# Patient Record
Sex: MALE | Race: WHITE | URBAN - METROPOLITAN AREA
[De-identification: names, ages, dates, MRNs, and addresses within clinical notes are randomized per-mention and may not be internally consistent; named-entity substitution may affect disease eponyms.]

---

## 2017-06-21 ENCOUNTER — EMERGENCY (EMERGENCY)
Facility: HOSPITAL | Age: 62
LOS: 0 days | Discharge: ANOTHER HOSPITAL | End: 2017-06-22
Attending: EMERGENCY MEDICINE | Admitting: EMERGENCY MEDICINE
Payer: COMMERCIAL

## 2017-06-21 VITALS — WEIGHT: 175.05 LBS

## 2017-06-21 DIAGNOSIS — K56.69 OTHER INTESTINAL OBSTRUCTION: Chronic | ICD-10-CM

## 2017-06-21 DIAGNOSIS — H53.9 UNSPECIFIED VISUAL DISTURBANCE: ICD-10-CM

## 2017-06-21 DIAGNOSIS — K35.2 ACUTE APPENDICITIS WITH GENERALIZED PERITONITIS: Chronic | ICD-10-CM

## 2017-06-21 DIAGNOSIS — K66.0 PERITONEAL ADHESIONS (POSTPROCEDURAL) (POSTINFECTION): Chronic | ICD-10-CM

## 2017-06-21 PROCEDURE — 99283 EMERGENCY DEPT VISIT LOW MDM: CPT

## 2017-06-21 NOTE — ED ADULT NURSE REASSESSMENT NOTE - NS ED NURSE REASSESS COMMENT FT1
Visual acuity up to line 7 "with my bad eye" and up to line 9 "with my good eye" Visual acuity up to line 7 "with my bad eye" and up to line 9 "with my good eye". Pt with 20/30 vision to left eye.

## 2017-06-21 NOTE — ED STATDOCS - OBJECTIVE STATEMENT
62 y/o male who saw floaters in his vision that looked like "red blood cells" 10 days ago. Pt then went on vacation, returned this week and began seeing bigger floaters. Is unable to get an appointment with PMD. Today, pt began to have cloudy vision only in right eye. PMD Dr. Baumann

## 2017-06-21 NOTE — ED ADULT TRIAGE NOTE - CHIEF COMPLAINT QUOTE
States he has been seeing "floaters" x1 week, and today started with cloudy vision. Unable to get appt with doctor and wants to be seen tonight.

## 2017-06-21 NOTE — ED STATDOCS - PSH
Abdominal adhesions  9/18/2014 laprascopic lysis of adhesions  Perforated appendicitis    SBO (small bowel obstruction)  x 3

## 2017-06-21 NOTE — ED ADULT NURSE NOTE - OBJECTIVE STATEMENT
Pt presents to ER c/o floaters and blurry vision in right eye. Pt reports having floaters in right eye for 10 days and today having onset of blurry vision at 5pm. 3mm pupils, PERRLA.  pt denies headache. neuro intact in b/l upper and lower extremity, equal b/l ROM, face symmetrical, no slurred speech. AO x 3 oriented to baseline, normal breathing pattern with no difficulty

## 2017-06-21 NOTE — ED STATDOCS - PROGRESS NOTE DETAILS
spoke with Dr. Frances opthalmology from [place] and will accept transfer to [place] to evaluate for retinal detachment

## 2017-06-22 ENCOUNTER — EMERGENCY (EMERGENCY)
Facility: HOSPITAL | Age: 62
LOS: 1 days | Discharge: HOME | End: 2017-06-22
Attending: PERSONAL EMERGENCY RESPONSE ATTENDANT | Admitting: PERSONAL EMERGENCY RESPONSE ATTENDANT
Payer: COMMERCIAL

## 2017-06-22 VITALS
RESPIRATION RATE: 17 BRPM | HEART RATE: 55 BPM | SYSTOLIC BLOOD PRESSURE: 144 MMHG | OXYGEN SATURATION: 99 % | TEMPERATURE: 98 F | DIASTOLIC BLOOD PRESSURE: 93 MMHG

## 2017-06-22 VITALS
DIASTOLIC BLOOD PRESSURE: 91 MMHG | SYSTOLIC BLOOD PRESSURE: 143 MMHG | OXYGEN SATURATION: 96 % | RESPIRATION RATE: 17 BRPM | TEMPERATURE: 98 F | HEART RATE: 53 BPM

## 2017-06-22 VITALS
RESPIRATION RATE: 17 BRPM | HEART RATE: 54 BPM | DIASTOLIC BLOOD PRESSURE: 100 MMHG | SYSTOLIC BLOOD PRESSURE: 174 MMHG | OXYGEN SATURATION: 96 % | TEMPERATURE: 98 F

## 2017-06-22 DIAGNOSIS — K66.0 PERITONEAL ADHESIONS (POSTPROCEDURAL) (POSTINFECTION): Chronic | ICD-10-CM

## 2017-06-22 DIAGNOSIS — K35.2 ACUTE APPENDICITIS WITH GENERALIZED PERITONITIS: Chronic | ICD-10-CM

## 2017-06-22 DIAGNOSIS — K56.69 OTHER INTESTINAL OBSTRUCTION: Chronic | ICD-10-CM

## 2017-06-22 PROCEDURE — 99283 EMERGENCY DEPT VISIT LOW MDM: CPT | Mod: 25

## 2017-06-22 PROCEDURE — 99283 EMERGENCY DEPT VISIT LOW MDM: CPT

## 2017-06-22 NOTE — ED PROVIDER NOTE - ATTENDING CONTRIBUTION TO CARE
Attending MD Palm.  Agree with above.  Pt is a 61 yr old male who endorses 10 day hx of ‘floaters in R eye’ and then today this became constant.  Pt seen at [facility] and transferred to [facility] ED for opho eval.  Other medical problems include HTN and occasional SBO’s, depression.  No eye pain.  Isolated R eye visual changes.  Visual fields intact without deficit on exam.  Endorses only complaint of floaters.  Pt has no eye pain.  Sxs have been present on and off for 10 days without headache or other associated neuro sxs.  Optho has performed thorough exam and recommend follow-up in their clinic tomorrow.  Pt also advised to follow-up with neurology for non-emergent imaging/eval.  Return to ED immediately for any associated sxs including headache, new/focal neuro deficits or development of similar sxs in L eye.  No numbness/weakness/tingling.  Stable for discharge.

## 2017-06-22 NOTE — ED ADULT NURSE NOTE - OBJECTIVE STATEMENT
transfer from [facility] with c/o cloudy vision rt eye since 5 pm; has had floaters x 10 days and went on vacation; now returned and feels condition is worsening; comes here for opto consult

## 2017-06-22 NOTE — CONSULT NOTE ADULT - SUBJECTIVE AND OBJECTIVE BOX
CC/HPI: 61 M p/w worsening floaters OD x10 days, now feels VA is decreasing.     PMH:   POH: none  meds: reviewed  all: NKDA    VA cc near 20/20 OU  P R+R OU  T 13 OU  EOM full OU  CVF full OU  no red desaturation    LL flat OU  C/S w+Q OU  K clear OU  AC D+F OU  I flat OU  L ns OU    DFE:  OD  OS CC/HPI: 61 M p/w worsening floaters OD x10 days, now feels VA is decreasing.     PMH: HTN, depression  POH: none  meds: reviewed  all: NKDA    VA cc near 20/20 OU  P R+R OU  T 13 OU  EOM full OU  CVF full OU  no red desaturation    LL flat OU  C/S w+Q OU  K clear OU  AC D+F OU  I flat OU  L ns OU    DFE:  OD nerve pink and sharp, m/v/p wnl, no RD/tears/holes  OS nerve pink and sharp, m/v/p wnl CC/HPI: 61 M p/w worsening floaters OD x10 days, now feels VA is decreasing.     PMH: HTN, depression  POH: none  meds: reviewed  all: NKDA    VA cc near 20/20 OU  P R+R OU  T 13 OU  EOM full OU  CVF full OU  no red desaturation    LL flat OU  C/S w+Q OU  K clear OU  AC D+Q OU  I flat OU  L ns OU    DFE:  OD nerve pink and sharp, m/v/p wnl, no RD/tears/holes, no vitritis, no PVD  OS nerve pink and sharp, m/v/p wnl

## 2017-06-22 NOTE — ED PROVIDER NOTE - OBJECTIVE STATEMENT
Water given    61 year old male presents with floaters in the right eye for 10 days. Today floaters acutely worsened. Went to [facility].  No headache, no pain, no photophobia, no fveer.

## 2017-06-22 NOTE — CONSULT NOTE ADULT - ASSESSMENT
pending 61 M w/ floaters OD. VA excellent. No acute pathology on exam.   - RD precautions  - f/u w/ ophthalmologist within one week for repeat exam. Can f/u at [address] [place] [address]. [address] [phone].

## 2018-02-10 ENCOUNTER — EMERGENCY (EMERGENCY)
Facility: HOSPITAL | Age: 63
LOS: 0 days | Discharge: HOME | End: 2018-02-10
Attending: EMERGENCY MEDICINE | Admitting: EMERGENCY MEDICINE
Payer: COMMERCIAL

## 2018-02-10 VITALS — WEIGHT: 190.04 LBS | HEIGHT: 70 IN

## 2018-02-10 VITALS
SYSTOLIC BLOOD PRESSURE: 130 MMHG | HEART RATE: 57 BPM | DIASTOLIC BLOOD PRESSURE: 81 MMHG | OXYGEN SATURATION: 99 % | RESPIRATION RATE: 19 BRPM

## 2018-02-10 DIAGNOSIS — R07.9 CHEST PAIN, UNSPECIFIED: ICD-10-CM

## 2018-02-10 DIAGNOSIS — Z87.891 PERSONAL HISTORY OF NICOTINE DEPENDENCE: ICD-10-CM

## 2018-02-10 DIAGNOSIS — K35.2 ACUTE APPENDICITIS WITH GENERALIZED PERITONITIS: Chronic | ICD-10-CM

## 2018-02-10 DIAGNOSIS — R55 SYNCOPE AND COLLAPSE: ICD-10-CM

## 2018-02-10 DIAGNOSIS — K56.69 OTHER INTESTINAL OBSTRUCTION: Chronic | ICD-10-CM

## 2018-02-10 DIAGNOSIS — K66.0 PERITONEAL ADHESIONS (POSTPROCEDURAL) (POSTINFECTION): Chronic | ICD-10-CM

## 2018-02-10 LAB
ALBUMIN SERPL ELPH-MCNC: 3.7 G/DL — SIGNIFICANT CHANGE UP (ref 3.3–5)
ALP SERPL-CCNC: 92 U/L — SIGNIFICANT CHANGE UP (ref 40–120)
ALT FLD-CCNC: 32 U/L — SIGNIFICANT CHANGE UP (ref 12–78)
ANION GAP SERPL CALC-SCNC: 6 MMOL/L — SIGNIFICANT CHANGE UP (ref 5–17)
APTT BLD: 31.5 SEC — SIGNIFICANT CHANGE UP (ref 27.5–37.4)
AST SERPL-CCNC: 24 U/L — SIGNIFICANT CHANGE UP (ref 15–37)
BASOPHILS # BLD AUTO: 0.1 K/UL — SIGNIFICANT CHANGE UP (ref 0–0.2)
BASOPHILS NFR BLD AUTO: 1.1 % — SIGNIFICANT CHANGE UP (ref 0–2)
BILIRUB SERPL-MCNC: 0.3 MG/DL — SIGNIFICANT CHANGE UP (ref 0.2–1.2)
BUN SERPL-MCNC: 11 MG/DL — SIGNIFICANT CHANGE UP (ref 7–23)
CALCIUM SERPL-MCNC: 8.6 MG/DL — SIGNIFICANT CHANGE UP (ref 8.5–10.1)
CHLORIDE SERPL-SCNC: 105 MMOL/L — SIGNIFICANT CHANGE UP (ref 96–108)
CK SERPL-CCNC: 120 U/L — SIGNIFICANT CHANGE UP (ref 26–308)
CO2 SERPL-SCNC: 28 MMOL/L — SIGNIFICANT CHANGE UP (ref 22–31)
CREAT SERPL-MCNC: 0.99 MG/DL — SIGNIFICANT CHANGE UP (ref 0.5–1.3)
EOSINOPHIL # BLD AUTO: 0.1 K/UL — SIGNIFICANT CHANGE UP (ref 0–0.5)
EOSINOPHIL NFR BLD AUTO: 1 % — SIGNIFICANT CHANGE UP (ref 0–6)
GLUCOSE SERPL-MCNC: 114 MG/DL — HIGH (ref 70–99)
HCT VFR BLD CALC: 46.2 % — SIGNIFICANT CHANGE UP (ref 39–50)
HGB BLD-MCNC: 15.4 G/DL — SIGNIFICANT CHANGE UP (ref 13–17)
INR BLD: 0.97 RATIO — SIGNIFICANT CHANGE UP (ref 0.88–1.16)
LYMPHOCYTES # BLD AUTO: 1.8 K/UL — SIGNIFICANT CHANGE UP (ref 1–3.3)
LYMPHOCYTES # BLD AUTO: 29.7 % — SIGNIFICANT CHANGE UP (ref 13–44)
MCHC RBC-ENTMCNC: 30.1 PG — SIGNIFICANT CHANGE UP (ref 27–34)
MCHC RBC-ENTMCNC: 33.4 GM/DL — SIGNIFICANT CHANGE UP (ref 32–36)
MCV RBC AUTO: 90 FL — SIGNIFICANT CHANGE UP (ref 80–100)
MONOCYTES # BLD AUTO: 0.8 K/UL — SIGNIFICANT CHANGE UP (ref 0–0.9)
MONOCYTES NFR BLD AUTO: 13 % — SIGNIFICANT CHANGE UP (ref 2–14)
NEUTROPHILS # BLD AUTO: 3.4 K/UL — SIGNIFICANT CHANGE UP (ref 1.8–7.4)
NEUTROPHILS NFR BLD AUTO: 55.1 % — SIGNIFICANT CHANGE UP (ref 43–77)
NT-PROBNP SERPL-SCNC: 38 PG/ML — SIGNIFICANT CHANGE UP (ref 0–125)
PLATELET # BLD AUTO: 253 K/UL — SIGNIFICANT CHANGE UP (ref 150–400)
POTASSIUM SERPL-MCNC: 3.6 MMOL/L — SIGNIFICANT CHANGE UP (ref 3.5–5.3)
POTASSIUM SERPL-SCNC: 3.6 MMOL/L — SIGNIFICANT CHANGE UP (ref 3.5–5.3)
PROT SERPL-MCNC: 7.2 GM/DL — SIGNIFICANT CHANGE UP (ref 6–8.3)
PROTHROM AB SERPL-ACNC: 10.5 SEC — SIGNIFICANT CHANGE UP (ref 9.8–12.7)
RBC # BLD: 5.13 M/UL — SIGNIFICANT CHANGE UP (ref 4.2–5.8)
RBC # FLD: 12.3 % — SIGNIFICANT CHANGE UP (ref 10.3–14.5)
SODIUM SERPL-SCNC: 139 MMOL/L — SIGNIFICANT CHANGE UP (ref 135–145)
TROPONIN I SERPL-MCNC: <0.015 NG/ML — SIGNIFICANT CHANGE UP (ref 0.01–0.04)
TROPONIN I SERPL-MCNC: <0.015 NG/ML — SIGNIFICANT CHANGE UP (ref 0.01–0.04)
WBC # BLD: 6.1 K/UL — SIGNIFICANT CHANGE UP (ref 3.8–10.5)
WBC # FLD AUTO: 6.1 K/UL — SIGNIFICANT CHANGE UP (ref 3.8–10.5)

## 2018-02-10 PROCEDURE — 99285 EMERGENCY DEPT VISIT HI MDM: CPT

## 2018-02-10 PROCEDURE — 71045 X-RAY EXAM CHEST 1 VIEW: CPT | Mod: 26

## 2018-02-10 RX ORDER — BUPROPION HYDROCHLORIDE 150 MG/1
0 TABLET, EXTENDED RELEASE ORAL
Qty: 0 | Refills: 0 | COMMUNITY

## 2018-02-10 RX ORDER — SODIUM CHLORIDE 9 MG/ML
3 INJECTION INTRAMUSCULAR; INTRAVENOUS; SUBCUTANEOUS ONCE
Qty: 0 | Refills: 0 | Status: COMPLETED | OUTPATIENT
Start: 2018-02-10 | End: 2018-02-10

## 2018-02-10 RX ORDER — AMLODIPINE BESYLATE 2.5 MG/1
0 TABLET ORAL
Qty: 0 | Refills: 0 | COMMUNITY

## 2018-02-10 RX ORDER — SODIUM CHLORIDE 9 MG/ML
1000 INJECTION INTRAMUSCULAR; INTRAVENOUS; SUBCUTANEOUS ONCE
Qty: 0 | Refills: 0 | Status: COMPLETED | OUTPATIENT
Start: 2018-02-10 | End: 2018-02-10

## 2018-02-10 RX ORDER — DOXAZOSIN MESYLATE 4 MG
0 TABLET ORAL
Qty: 0 | Refills: 0 | COMMUNITY

## 2018-02-10 RX ADMIN — SODIUM CHLORIDE 1000 MILLILITER(S): 9 INJECTION INTRAMUSCULAR; INTRAVENOUS; SUBCUTANEOUS at 17:50

## 2018-02-10 RX ADMIN — SODIUM CHLORIDE 3 MILLILITER(S): 9 INJECTION INTRAMUSCULAR; INTRAVENOUS; SUBCUTANEOUS at 17:48

## 2018-02-10 NOTE — ED PROVIDER NOTE - PROGRESS NOTE DETAILS
Dr. Nath:  Reevaluated patient at bedside.  Patient feeling much improved, no cp, lightheaded, + self-ambulatory w/o sympts., + steady gait.  Discussed the results of all diagnostic testing in ED and copies of all reports given.   An opportunity to ask questions was given.  Discussed the importance of prompt, close medical follow-up.  Patient will return with any changes, concerns or persistent / worsening symptoms.  Understanding of all instructions verbalized.

## 2018-02-10 NOTE — ED PROVIDER NOTE - PMH
Depression    Depression    GERD (gastroesophageal reflux disease)    HTN (hypertension)    Hypertension    SBO (small bowel obstruction)    Small bowel obstruction    Suicidal intent

## 2018-02-10 NOTE — ED PROVIDER NOTE - OBJECTIVE STATEMENT
63 y/o male with PMHx of HTN, depression, SBO, GERD presents to the ED c/o medium sharp left lower nonpleuritic chest pain occurring at 4pm and lasting a half an hour. Slight, lingering CP in ED. Pt was sitting on the couch, when he stood up suddenly, and experienced lightheadedness/dizziness. +SOB. Walked upstairs to lie down. Experienced 1 episode of vomiting after near-syncopal episode. Denies abd pain, nausea. Sees Dr. Pike. Former smoker.

## 2018-02-10 NOTE — ED PROVIDER NOTE - MEDICAL DECISION MAKING DETAILS
61 y/o male with PMHx of HTN, former smoker, ambulatory to ED, s/p near-syncopal episodes assoc with chest discomfort. physical exam unremarkable. plan for EKG, monitor, labs including serial cardiac enzymes, IVF, observe, reassess.

## 2018-02-10 NOTE — ED PROVIDER NOTE - CONSTITUTIONAL, MLM
normal... white male adult, well nourished, awake, alert, oriented to person, place, time/situation and in no apparent distress. not acutely ill.

## 2018-02-10 NOTE — ED ADULT NURSE REASSESSMENT NOTE - NS ED NURSE REASSESS COMMENT FT1
introduced myself as pts night nurse, pt and wife made aware that lab results WNL, pt aware of second troponin draw 3 hours from first draw, explained reasoning. denies CP at this time, vss. pt informed he can eat, wife shown kitchen area.

## 2018-02-10 NOTE — ED PROVIDER NOTE - CARDIAC, MLM
Normal rate, regular rhythm.  Heart sounds S1, S2.  No murmurs, rubs or gallops. normal radial pulse

## 2018-06-18 ENCOUNTER — RECORD ABSTRACTING (OUTPATIENT)
Age: 63
End: 2018-06-18

## 2018-06-19 ENCOUNTER — APPOINTMENT (OUTPATIENT)
Dept: CARDIOTHORACIC SURGERY | Facility: CLINIC | Age: 63
End: 2018-06-19
Payer: COMMERCIAL

## 2018-06-19 VITALS
BODY MASS INDEX: 26.52 KG/M2 | WEIGHT: 175 LBS | OXYGEN SATURATION: 97 % | HEIGHT: 68 IN | SYSTOLIC BLOOD PRESSURE: 118 MMHG | RESPIRATION RATE: 16 BRPM | HEART RATE: 78 BPM | DIASTOLIC BLOOD PRESSURE: 77 MMHG

## 2018-06-19 PROCEDURE — 99205 OFFICE O/P NEW HI 60 MIN: CPT

## 2019-03-06 ENCOUNTER — EMERGENCY (EMERGENCY)
Facility: HOSPITAL | Age: 64
LOS: 0 days | Discharge: HOME | End: 2019-03-06
Attending: EMERGENCY MEDICINE | Admitting: EMERGENCY MEDICINE
Payer: COMMERCIAL

## 2019-03-06 VITALS — WEIGHT: 179.9 LBS | HEIGHT: 69 IN

## 2019-03-06 VITALS
HEART RATE: 81 BPM | SYSTOLIC BLOOD PRESSURE: 124 MMHG | OXYGEN SATURATION: 100 % | TEMPERATURE: 99 F | RESPIRATION RATE: 15 BRPM | DIASTOLIC BLOOD PRESSURE: 92 MMHG

## 2019-03-06 DIAGNOSIS — K21.9 GASTRO-ESOPHAGEAL REFLUX DISEASE WITHOUT ESOPHAGITIS: ICD-10-CM

## 2019-03-06 DIAGNOSIS — K66.0 PERITONEAL ADHESIONS (POSTPROCEDURAL) (POSTINFECTION): Chronic | ICD-10-CM

## 2019-03-06 DIAGNOSIS — F32.9 MAJOR DEPRESSIVE DISORDER, SINGLE EPISODE, UNSPECIFIED: ICD-10-CM

## 2019-03-06 DIAGNOSIS — Z88.1 ALLERGY STATUS TO OTHER ANTIBIOTIC AGENTS STATUS: ICD-10-CM

## 2019-03-06 DIAGNOSIS — R07.89 OTHER CHEST PAIN: ICD-10-CM

## 2019-03-06 DIAGNOSIS — R05 COUGH: ICD-10-CM

## 2019-03-06 DIAGNOSIS — I10 ESSENTIAL (PRIMARY) HYPERTENSION: ICD-10-CM

## 2019-03-06 DIAGNOSIS — K35.2 ACUTE APPENDICITIS WITH GENERALIZED PERITONITIS: Chronic | ICD-10-CM

## 2019-03-06 DIAGNOSIS — R14.0 ABDOMINAL DISTENSION (GASEOUS): ICD-10-CM

## 2019-03-06 DIAGNOSIS — I71.4 ABDOMINAL AORTIC ANEURYSM, WITHOUT RUPTURE: ICD-10-CM

## 2019-03-06 DIAGNOSIS — K56.69 OTHER INTESTINAL OBSTRUCTION: Chronic | ICD-10-CM

## 2019-03-06 PROBLEM — K56.609 UNSPECIFIED INTESTINAL OBSTRUCTION, UNSPECIFIED AS TO PARTIAL VERSUS COMPLETE OBSTRUCTION: Chronic | Status: ACTIVE | Noted: 2018-02-10

## 2019-03-06 LAB
ALBUMIN SERPL ELPH-MCNC: 4.2 G/DL — SIGNIFICANT CHANGE UP (ref 3.3–5)
ALP SERPL-CCNC: 91 U/L — SIGNIFICANT CHANGE UP (ref 40–120)
ALT FLD-CCNC: 32 U/L — SIGNIFICANT CHANGE UP (ref 12–78)
ANION GAP SERPL CALC-SCNC: 9 MMOL/L — SIGNIFICANT CHANGE UP (ref 5–17)
APPEARANCE UR: CLEAR — SIGNIFICANT CHANGE UP
APTT BLD: 35.6 SEC — SIGNIFICANT CHANGE UP (ref 27.5–36.3)
AST SERPL-CCNC: 27 U/L — SIGNIFICANT CHANGE UP (ref 15–37)
BACTERIA # UR AUTO: ABNORMAL
BASOPHILS # BLD AUTO: 0.02 K/UL — SIGNIFICANT CHANGE UP (ref 0–0.2)
BASOPHILS NFR BLD AUTO: 0.3 % — SIGNIFICANT CHANGE UP (ref 0–2)
BILIRUB SERPL-MCNC: 0.5 MG/DL — SIGNIFICANT CHANGE UP (ref 0.2–1.2)
BILIRUB UR-MCNC: NEGATIVE — SIGNIFICANT CHANGE UP
BUN SERPL-MCNC: 16 MG/DL — SIGNIFICANT CHANGE UP (ref 7–23)
CALCIUM SERPL-MCNC: 8.7 MG/DL — SIGNIFICANT CHANGE UP (ref 8.5–10.1)
CHLORIDE SERPL-SCNC: 108 MMOL/L — SIGNIFICANT CHANGE UP (ref 96–108)
CO2 SERPL-SCNC: 24 MMOL/L — SIGNIFICANT CHANGE UP (ref 22–31)
COLOR SPEC: YELLOW — SIGNIFICANT CHANGE UP
CREAT SERPL-MCNC: 0.96 MG/DL — SIGNIFICANT CHANGE UP (ref 0.5–1.3)
DIFF PNL FLD: ABNORMAL
EOSINOPHIL # BLD AUTO: 0.05 K/UL — SIGNIFICANT CHANGE UP (ref 0–0.5)
EOSINOPHIL NFR BLD AUTO: 0.8 % — SIGNIFICANT CHANGE UP (ref 0–6)
GLUCOSE SERPL-MCNC: 94 MG/DL — SIGNIFICANT CHANGE UP (ref 70–99)
GLUCOSE UR QL: NEGATIVE MG/DL — SIGNIFICANT CHANGE UP
HCT VFR BLD CALC: 47.2 % — SIGNIFICANT CHANGE UP (ref 39–50)
HGB BLD-MCNC: 16.2 G/DL — SIGNIFICANT CHANGE UP (ref 13–17)
IMM GRANULOCYTES NFR BLD AUTO: 0.2 % — SIGNIFICANT CHANGE UP (ref 0–1.5)
INR BLD: 1.01 RATIO — SIGNIFICANT CHANGE UP (ref 0.88–1.16)
KETONES UR-MCNC: ABNORMAL
LACTATE SERPL-SCNC: 0.9 MMOL/L — SIGNIFICANT CHANGE UP (ref 0.7–2)
LEUKOCYTE ESTERASE UR-ACNC: NEGATIVE — SIGNIFICANT CHANGE UP
LYMPHOCYTES # BLD AUTO: 1.67 K/UL — SIGNIFICANT CHANGE UP (ref 1–3.3)
LYMPHOCYTES # BLD AUTO: 28 % — SIGNIFICANT CHANGE UP (ref 13–44)
MCHC RBC-ENTMCNC: 31 PG — SIGNIFICANT CHANGE UP (ref 27–34)
MCHC RBC-ENTMCNC: 34.3 GM/DL — SIGNIFICANT CHANGE UP (ref 32–36)
MCV RBC AUTO: 90.4 FL — SIGNIFICANT CHANGE UP (ref 80–100)
MONOCYTES # BLD AUTO: 0.68 K/UL — SIGNIFICANT CHANGE UP (ref 0–0.9)
MONOCYTES NFR BLD AUTO: 11.4 % — SIGNIFICANT CHANGE UP (ref 2–14)
NEUTROPHILS # BLD AUTO: 3.53 K/UL — SIGNIFICANT CHANGE UP (ref 1.8–7.4)
NEUTROPHILS NFR BLD AUTO: 59.3 % — SIGNIFICANT CHANGE UP (ref 43–77)
NITRITE UR-MCNC: NEGATIVE — SIGNIFICANT CHANGE UP
NRBC # BLD: 0 /100 WBCS — SIGNIFICANT CHANGE UP (ref 0–0)
PH UR: 6.5 — SIGNIFICANT CHANGE UP (ref 5–8)
PLATELET # BLD AUTO: 255 K/UL — SIGNIFICANT CHANGE UP (ref 150–400)
POTASSIUM SERPL-MCNC: 3.8 MMOL/L — SIGNIFICANT CHANGE UP (ref 3.5–5.3)
POTASSIUM SERPL-SCNC: 3.8 MMOL/L — SIGNIFICANT CHANGE UP (ref 3.5–5.3)
PROT SERPL-MCNC: 7.8 GM/DL — SIGNIFICANT CHANGE UP (ref 6–8.3)
PROT UR-MCNC: NEGATIVE MG/DL — SIGNIFICANT CHANGE UP
PROTHROM AB SERPL-ACNC: 11.2 SEC — SIGNIFICANT CHANGE UP (ref 10–12.9)
RBC # BLD: 5.22 M/UL — SIGNIFICANT CHANGE UP (ref 4.2–5.8)
RBC # FLD: 12.8 % — SIGNIFICANT CHANGE UP (ref 10.3–14.5)
RBC CASTS # UR COMP ASSIST: SIGNIFICANT CHANGE UP /HPF (ref 0–4)
SODIUM SERPL-SCNC: 141 MMOL/L — SIGNIFICANT CHANGE UP (ref 135–145)
SP GR SPEC: 1.02 — SIGNIFICANT CHANGE UP (ref 1.01–1.02)
TROPONIN I SERPL-MCNC: <0.015 NG/ML — SIGNIFICANT CHANGE UP (ref 0.01–0.04)
TROPONIN I SERPL-MCNC: <0.015 NG/ML — SIGNIFICANT CHANGE UP (ref 0.01–0.04)
UROBILINOGEN FLD QL: NEGATIVE MG/DL — SIGNIFICANT CHANGE UP
WBC # BLD: 5.96 K/UL — SIGNIFICANT CHANGE UP (ref 3.8–10.5)
WBC # FLD AUTO: 5.96 K/UL — SIGNIFICANT CHANGE UP (ref 3.8–10.5)
WBC UR QL: NEGATIVE — SIGNIFICANT CHANGE UP

## 2019-03-06 PROCEDURE — 74177 CT ABD & PELVIS W/CONTRAST: CPT | Mod: 26

## 2019-03-06 PROCEDURE — 71045 X-RAY EXAM CHEST 1 VIEW: CPT | Mod: 26

## 2019-03-06 PROCEDURE — 93010 ELECTROCARDIOGRAM REPORT: CPT

## 2019-03-06 PROCEDURE — 99284 EMERGENCY DEPT VISIT MOD MDM: CPT | Mod: 25

## 2019-03-06 RX ORDER — SODIUM CHLORIDE 9 MG/ML
3 INJECTION INTRAMUSCULAR; INTRAVENOUS; SUBCUTANEOUS ONCE
Qty: 0 | Refills: 0 | Status: COMPLETED | OUTPATIENT
Start: 2019-03-06 | End: 2019-03-06

## 2019-03-06 RX ADMIN — SODIUM CHLORIDE 3 MILLILITER(S): 9 INJECTION INTRAMUSCULAR; INTRAVENOUS; SUBCUTANEOUS at 12:40

## 2019-03-06 NOTE — ED ADULT NURSE NOTE - CHIEF COMPLAINT QUOTE
Sent by PCP for multiple medical complaints. Pt reports abd bloating, cough chest tightness and urinary issues x weeks. Hx AAA

## 2019-03-06 NOTE — ED STATDOCS - PROGRESS NOTE DETAILS
SOFI Serra:   Patient has been seen, evaluated and orders have been written by the attending in intake. Patient is stable.  I will follow up the results of orders written and I will continue to evaluate/observe the patient.  Kalpana Serra PA-C t. re-eval multiple times sduring ED visit.  Reported weeks of Left side chest tightness.  Pt. alsways nauseous due to stomach issues.  Neg vomiting, sweats, dizziness, SOB.  Pt. has Cardiologist.  Has had stress tests in the past without issues.  Neg cough, fevers, SOB. Pt. also c/o abd distention for few weeks.   Multiple SBO in the past.  Labs WNL.  CXR without acute pathology.  CT scan negative for bowel obstruction or other acute pathology.  Chronic liver cysts.  On re-eval:   CTA B.  RRR.  Abd: round, ACtive BS x4, Soft, NT/ND.  Will refer to Cardio and GI for further eval.  Kalpana Srera PA-C 2nd Trop not elevated/  Will be dc home to F.U with Dr. Corrigan for Further eval.  Kalpana Serra PA-C

## 2019-03-06 NOTE — ED STATDOCS - OBJECTIVE STATEMENT
62 y/o m with PMHx of Depression, GERD, HTN, SBO, AAA, suicidal intent, h/o abd adhesions, perforated appendicitis, SBO x3 presenting to the ED sent by PCP c/o abd distension, chest tightness, urine frequency cough x3 weeks. For the past three weeks pt has had some chest tightness and intermittent cough. Last night, pt reports that he urinated multiple times and noticed he was two pounds lighter. States he has chronic abd discomfort and nausea. Went to PMD where he had an EKG done and sent to ED. Recently shoveled snow. Recent nuclear stress test last. 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 Nonsmoker. Occasional EtOH consumption. PMD: Dr. Pike Cardio: Dr. Corrigan.

## 2019-03-06 NOTE — ED ADULT NURSE NOTE - NSIMPLEMENTINTERV_GEN_ALL_ED
Implemented All Universal Safety Interventions:  [place] to call system. Call bell, personal items and telephone within reach. Instruct patient to call for assistance. Room bathroom lighting operational. Non-slip footwear when patient is off stretcher. Physically safe environment: no spills, clutter or unnecessary equipment. Stretcher in lowest position, wheels locked, appropriate side rails in place.

## 2019-03-06 NOTE — ED STATDOCS - PSH
Abdominal adhesions    Abdominal adhesions  9/18/2014 laprascopic lysis of adhesions  Perforated appendicitis    SBO (small bowel obstruction)  x 3

## 2019-03-06 NOTE — ED ADULT TRIAGE NOTE - CHIEF COMPLAINT QUOTE
Sent by PCP for multiple medical complaints. Pt reports abd bloating, cough chest tightness and urinary issues x weeks. Sent by PCP for multiple medical complaints. Pt reports abd bloating, cough chest tightness and urinary issues x weeks. Hx AAA

## 2019-03-21 NOTE — H&P ADULT - NSHPPHYSICALEXAM_GEN_ALL_CORE
Vital Signs : BP        HR       RR                 Constitutional: well developed, well nourished, no deformities and no acute distress    Neurological: Alert & Oriented x 3, SOUZA, no focal deficits    HEENT: NC/AT, PERRLA, EOMI,  Neck supple.    Respiratory: CTA B/L, No wheezing/crackles/rhonchi    Cardiovascular: (+) S1 & S2, RRR, No m/r/g    Gastrointestinal: soft, NT, nondistended, (+) BS    Genitourinary: non distended bladder, voiding freely    Extremities: No pedal edema, No clubbing, No cyanosis    Skin:  normal skin color and pigmentation, no skin lesions Vital Signs : BP  140/60      HR  60     RR   16              Constitutional: well developed, well nourished, no deformities and no acute distress    Neurological: Alert & Oriented x 3, SOUZA, no focal deficits    HEENT: NC/AT, PERRLA, EOMI,  Neck supple.    Respiratory: CTA B/L, No wheezing/crackles/rhonchi    Cardiovascular: (+) S1 & S2, RRR, No m/r/g    Gastrointestinal: soft, NT, nondistended, (+) BS    Genitourinary: non distended bladder, voiding freely    Extremities: No pedal edema, No clubbing, No cyanosis    Skin:  normal skin color and pigmentation, no skin lesions

## 2019-03-21 NOTE — H&P ADULT - NSICDXPASTMEDICALHX_GEN_ALL_CORE_FT
PAST MEDICAL HISTORY:  Depression     Depression     GERD (gastroesophageal reflux disease)     HTN (hypertension)     Hypertension     SBO (small bowel obstruction)     Small bowel obstruction     Suicidal intent

## 2019-03-21 NOTE — H&P ADULT - PROBLEM SELECTOR PLAN 1
Pt is referred for Lt heart cath/possible PCI. Labs & medications are reviewed. Informed consent obtained after discussion of [facility] risks, benefits and alternatives  with patient. Risk discussed included, but not limited to MI, stroke, mortality, major bleeding, arrhythmia, or infection.  An educational material provided. Pt. verbalizes understandings of pre-procedural instructions.

## 2019-03-21 NOTE — H&P ADULT - ASSESSMENT
62 yo M with PMHx of HTN, depression/anxiety, has been c/o chest tightness while under a lot of stress at work which led to recent ER visit. Work -up was negative in ER, Pt referred for LHC with possible intervention. 64 yo M with PMHx of HTN, depression/anxiety, has been c/o chest tightness while under a lot of stress at work which led to recent ER visit. Work -up was negative in ER, Pt referred for LHC with possible intervention.  Pt's bleeding risk score 1.4%.

## 2019-03-21 NOTE — H&P ADULT - NSICDXPASTSURGICALHX_GEN_ALL_CORE_FT
PAST SURGICAL HISTORY:  Abdominal adhesions 9/18/2014 laprascopic lysis of adhesions    Abdominal adhesions     Perforated appendicitis     SBO (small bowel obstruction) x 3

## 2019-03-21 NOTE — H&P ADULT - NSHPREVIEWOFSYSTEMS_GEN_ALL_CORE
General: Pt denies recent weight loss/fever/chills    Neurological: denies numbness or  sensation loss    Cardiovascular: denies chest pain/palpitations/leg edema    Respiratory and Thorax: denies SOB/cough/wheezing    Gastrointestinal: denies abdominal pain/diarrhea/constipation/bloody stool    Genitourinary: denies urinary frequency/urgency/ dysuria    Musculoskeletal: denies joint pain or swelling, denies restricted motion    Hematologic: denies abnormal bleeding General: Pt denies recent weight loss/fever/chills    Neurological: denies numbness or  sensation loss    Cardiovascular: denies palpitations/leg edema (+) chest pain occasional    Respiratory and Thorax: denies SOB/cough/wheezing    Gastrointestinal: denies abdominal pain/diarrhea/constipation/bloody stool    Genitourinary: denies urinary frequency/urgency/ dysuria    Musculoskeletal: denies joint pain or swelling, denies restricted motion    Hematologic: denies abnormal bleeding

## 2019-03-21 NOTE — H&P ADULT - HISTORY OF PRESENT ILLNESS
62 yo M with PMHx of HTN, depression/anxiety, has been c/o chest tightness while under a lot of stress at work which led to recent ER visit. Work -up was negative in ER, Pt referred for LHC with possible intervention.

## 2019-03-22 ENCOUNTER — OUTPATIENT (OUTPATIENT)
Dept: OUTPATIENT SERVICES | Facility: HOSPITAL | Age: 64
LOS: 1 days | Discharge: HOME | End: 2019-03-22

## 2019-03-22 VITALS
DIASTOLIC BLOOD PRESSURE: 74 MMHG | RESPIRATION RATE: 16 BRPM | OXYGEN SATURATION: 98 % | SYSTOLIC BLOOD PRESSURE: 146 MMHG | HEART RATE: 73 BPM

## 2019-03-22 VITALS
OXYGEN SATURATION: 99 % | RESPIRATION RATE: 16 BRPM | HEART RATE: 64 BPM | DIASTOLIC BLOOD PRESSURE: 76 MMHG | SYSTOLIC BLOOD PRESSURE: 135 MMHG

## 2019-03-22 DIAGNOSIS — K66.0 PERITONEAL ADHESIONS (POSTPROCEDURAL) (POSTINFECTION): Chronic | ICD-10-CM

## 2019-03-22 DIAGNOSIS — K56.69 OTHER INTESTINAL OBSTRUCTION: Chronic | ICD-10-CM

## 2019-03-22 DIAGNOSIS — K35.2 ACUTE APPENDICITIS WITH GENERALIZED PERITONITIS: Chronic | ICD-10-CM

## 2019-03-22 DIAGNOSIS — R07.9 CHEST PAIN, UNSPECIFIED: ICD-10-CM

## 2019-03-22 LAB
HCV AB S/CO SERPL IA: 0.17 S/CO — SIGNIFICANT CHANGE UP (ref 0–0.79)
HCV AB SERPL-IMP: SIGNIFICANT CHANGE UP

## 2019-03-22 RX ORDER — SODIUM CHLORIDE 9 MG/ML
1000 INJECTION INTRAMUSCULAR; INTRAVENOUS; SUBCUTANEOUS
Qty: 0 | Refills: 0 | Status: DISCONTINUED | OUTPATIENT
Start: 2019-03-22 | End: 2019-04-06

## 2019-03-22 NOTE — CHART NOTE - NSCHARTNOTEFT_GEN_A_CORE
Nurse Practitioner Progress note:     HPI:  62 yo M with PMHx of HTN, depression/anxiety, has been c/o chest tightness while under a lot of stress at work which led to recent ER visit. Work -up was negative in ER, Pt referred for C with possible intervention. (21 Mar 2019 16:28)      T(C): --  HR: 73 (03-22-19 @ 08:42) (73 - 73)  BP: 146/74 (03-22-19 @ 08:42) (146/74 - 146/74)  RR: 16 (03-22-19 @ 08:42) (16 - 16)  SpO2: 98% (03-22-19 @ 08:42) (98% - 98%)  Wt(kg): --    PHYSICAL EXAM:  Neurologic: Non-focal, AxOx3.  No neuro deficits  Vascular: Peripheral pulses palpable 2+ bilaterally  Procedure Site: Rt. femoral sheath pulled manual pressure applied x20 minutes site benign soft no bleeding no hematoma +1PP      PROCEDURE RESULTS:  S/P LHC non-obstructive CAD            ASSESSMENT/PLAN:   62 yo M with PMHx of HTN, depression/anxiety, has been c/o chest tightness while under a lot of stress at work which led to recent ER visit. Work -up was negative in ER, Pt referred for LHC with possible intervention. S/P LHC  	  -VS, labs, diet, activity as per post cath orders  -IV hydration  -Encourage PO fluids  -Continue current medications  -Pt. to be discharged home today  -Plan of care D/W pt. and MD  -Post cath instructions reviewed with pt., pt. verbalizes and understands instructions  -Follow-up with attending Nurse Practitioner Progress note:     HPI:  64 yo M with PMHx of HTN, depression/anxiety, has been c/o chest tightness while under a lot of stress at work which led to recent ER visit. Work -up was negative in ER, Pt referred for LHC with possible intervention. (21 Mar 2019 16:28)      T(C): --  HR: 73 (03-22-19 @ 08:42) (73 - 73)  BP: 146/74 (03-22-19 @ 08:42) (146/74 - 146/74)  RR: 16 (03-22-19 @ 08:42) (16 - 16)  SpO2: 98% (03-22-19 @ 08:42) (98% - 98%)  Wt(kg): --    PHYSICAL EXAM:  Neurologic: Non-focal, AxOx3.  No neuro deficits  Vascular: Peripheral pulses palpable 2+ bilaterally  Procedure Site: Rt. femoral sheath pulled manual pressure applied x20 minutes site benign soft no bleeding no hematoma +1P    PROCEDURE RESULTS:  S/P LHC non-obstructive CAD    ASSESSMENT/PLAN:  64 yo M with PMHx of HTN, depression/anxiety, has been c/o chest tightness while under a lot of stress at work which led to recent ER visit. Work -up was negative in ER, Pt referred for LHC with possible intervention. S/P LHC      	  -VS, labs, diet, activity as per post cath orders  -IV hydration  -Encourage PO fluids  -Continue current medications  -Pt. to be discharged home today  -Plan of care D/W pt. and MD  -Post cath instructions reviewed with pt., pt. verbalizes and understands instructions  -Follow-up with attending

## 2019-03-25 DIAGNOSIS — Z87.19 PERSONAL HISTORY OF OTHER DISEASES OF THE DIGESTIVE SYSTEM: ICD-10-CM

## 2019-03-25 DIAGNOSIS — I77.819 AORTIC ECTASIA, UNSPECIFIED SITE: ICD-10-CM

## 2019-03-25 DIAGNOSIS — I25.118 ATHEROSCLEROTIC HEART DISEASE OF NATIVE CORONARY ARTERY WITH OTHER FORMS OF ANGINA PECTORIS: ICD-10-CM

## 2019-03-25 DIAGNOSIS — I20.8 OTHER FORMS OF ANGINA PECTORIS: ICD-10-CM

## 2019-03-25 DIAGNOSIS — E78.00 PURE HYPERCHOLESTEROLEMIA, UNSPECIFIED: ICD-10-CM

## 2019-03-25 DIAGNOSIS — R07.89 OTHER CHEST PAIN: ICD-10-CM

## 2019-03-25 DIAGNOSIS — Z87.891 PERSONAL HISTORY OF NICOTINE DEPENDENCE: ICD-10-CM

## 2019-03-25 DIAGNOSIS — F41.8 OTHER SPECIFIED ANXIETY DISORDERS: ICD-10-CM

## 2019-03-25 DIAGNOSIS — I10 ESSENTIAL (PRIMARY) HYPERTENSION: ICD-10-CM

## 2019-03-25 DIAGNOSIS — K21.9 GASTRO-ESOPHAGEAL REFLUX DISEASE WITHOUT ESOPHAGITIS: ICD-10-CM

## 2019-03-25 DIAGNOSIS — Z88.3 ALLERGY STATUS TO OTHER ANTI-INFECTIVE AGENTS: ICD-10-CM

## 2020-05-18 ENCOUNTER — APPOINTMENT (OUTPATIENT)
Dept: NEUROLOGY | Facility: CLINIC | Age: 65
End: 2020-05-18

## 2020-08-27 ENCOUNTER — APPOINTMENT (OUTPATIENT)
Dept: COLORECTAL SURGERY | Facility: CLINIC | Age: 65
End: 2020-08-27
Payer: COMMERCIAL

## 2020-08-27 VITALS
TEMPERATURE: 98.6 F | HEART RATE: 68 BPM | HEIGHT: 68 IN | BODY MASS INDEX: 27.58 KG/M2 | DIASTOLIC BLOOD PRESSURE: 67 MMHG | RESPIRATION RATE: 16 BRPM | SYSTOLIC BLOOD PRESSURE: 155 MMHG | WEIGHT: 182 LBS

## 2020-08-27 DIAGNOSIS — R10.84 GENERALIZED ABDOMINAL PAIN: ICD-10-CM

## 2020-08-27 PROCEDURE — 99203 OFFICE O/P NEW LOW 30 MIN: CPT

## 2020-08-27 NOTE — CONSULT LETTER
[Dear  ___] : Dear  [unfilled], [Consult Letter:] : I had the pleasure of evaluating your patient, [unfilled]. [Please see my note below.] : Please see my note below. [Consult Closing:] : Thank you very much for allowing me to participate in the care of this patient.  If you have any questions, please do not hesitate to contact me. [Sincerely,] : Sincerely, [FreeTextEntry3] : Wild Reina M.D. FACS, FASCRS

## 2020-08-27 NOTE — ASSESSMENT
[FreeTextEntry1] : 64-year-old male with abdominal pain. Recommend CT scan of abdomen and pelvis p.o. and IV contrast

## 2020-08-27 NOTE — HISTORY OF PRESENT ILLNESS
[FreeTextEntry1] : Consultation requested by Dr. Rox Pike for abdominal pain. 64-year-old male with a long-standing history of abdominal pain previous history of adhesions and obstruction. Symptoms have been worsening over the last several years.

## 2020-08-27 NOTE — PHYSICAL EXAM
[Abdomen Masses] : No abdominal masses [Abdomen Tenderness] : ~T No ~M abdominal tenderness [Tender] : nontender [JVD] : no jugular venous distention  [Normal Breath Sounds] : Normal breath sounds [Normal Heart Sounds] : normal heart sounds [Normal Rate and Rhythm] : normal rate and rhythm [No Rash or Lesion] : No rash or lesion [Alert] : alert [Oriented to Person] : oriented to person [Oriented to Place] : oriented to place [Oriented to Time] : oriented to time [Calm] : calm [de-identified] : Looks well in no distress, of stated age. [de-identified] : pupils equal reactive to light normocephalic atraumatic. [de-identified] : moves all 4 extremities appropriately with 5 over 5 strength

## 2020-08-27 NOTE — REVIEW OF SYSTEMS
[Feeling Poorly] : feeling poorly [As Noted in HPI] : as noted in HPI [Abdominal Pain] : abdominal pain [Negative] : Endocrine

## 2021-02-20 ENCOUNTER — OUTPATIENT (OUTPATIENT)
Dept: OUTPATIENT SERVICES | Facility: HOSPITAL | Age: 66
LOS: 1 days | End: 2021-02-20
Payer: COMMERCIAL

## 2021-02-20 ENCOUNTER — APPOINTMENT (OUTPATIENT)
Dept: ULTRASOUND IMAGING | Facility: CLINIC | Age: 66
End: 2021-02-20
Payer: COMMERCIAL

## 2021-02-20 DIAGNOSIS — K56.69 OTHER INTESTINAL OBSTRUCTION: Chronic | ICD-10-CM

## 2021-02-20 DIAGNOSIS — R22.1 LOCALIZED SWELLING, MASS AND LUMP, NECK: ICD-10-CM

## 2021-02-20 DIAGNOSIS — K66.0 PERITONEAL ADHESIONS (POSTPROCEDURAL) (POSTINFECTION): Chronic | ICD-10-CM

## 2021-02-20 DIAGNOSIS — K35.2 ACUTE APPENDICITIS WITH GENERALIZED PERITONITIS: Chronic | ICD-10-CM

## 2021-02-20 PROCEDURE — 76536 US EXAM OF HEAD AND NECK: CPT | Mod: 26

## 2021-02-20 PROCEDURE — 76536 US EXAM OF HEAD AND NECK: CPT

## 2021-04-18 ENCOUNTER — INPATIENT (INPATIENT)
Facility: HOSPITAL | Age: 66
LOS: 1 days | Discharge: HOME | DRG: 69 | End: 2021-04-20
Attending: HOSPITALIST | Admitting: INTERNAL MEDICINE
Payer: COMMERCIAL

## 2021-04-18 VITALS
DIASTOLIC BLOOD PRESSURE: 100 MMHG | TEMPERATURE: 98 F | SYSTOLIC BLOOD PRESSURE: 157 MMHG | RESPIRATION RATE: 16 BRPM | HEART RATE: 68 BPM | HEIGHT: 69 IN | OXYGEN SATURATION: 95 %

## 2021-04-18 DIAGNOSIS — K66.0 PERITONEAL ADHESIONS (POSTPROCEDURAL) (POSTINFECTION): Chronic | ICD-10-CM

## 2021-04-18 DIAGNOSIS — G45.9 TRANSIENT CEREBRAL ISCHEMIC ATTACK, UNSPECIFIED: ICD-10-CM

## 2021-04-18 DIAGNOSIS — R42 DIZZINESS AND GIDDINESS: ICD-10-CM

## 2021-04-18 DIAGNOSIS — K35.2 ACUTE APPENDICITIS WITH GENERALIZED PERITONITIS: Chronic | ICD-10-CM

## 2021-04-18 DIAGNOSIS — I10 ESSENTIAL (PRIMARY) HYPERTENSION: ICD-10-CM

## 2021-04-18 DIAGNOSIS — E78.5 HYPERLIPIDEMIA, UNSPECIFIED: ICD-10-CM

## 2021-04-18 DIAGNOSIS — K21.9 GASTRO-ESOPHAGEAL REFLUX DISEASE WITHOUT ESOPHAGITIS: ICD-10-CM

## 2021-04-18 DIAGNOSIS — F41.9 ANXIETY DISORDER, UNSPECIFIED: ICD-10-CM

## 2021-04-18 DIAGNOSIS — R29.700 NIHSS SCORE 0: ICD-10-CM

## 2021-04-18 DIAGNOSIS — K56.69 OTHER INTESTINAL OBSTRUCTION: Chronic | ICD-10-CM

## 2021-04-18 DIAGNOSIS — F32.9 MAJOR DEPRESSIVE DISORDER, SINGLE EPISODE, UNSPECIFIED: ICD-10-CM

## 2021-04-18 LAB
ALBUMIN SERPL ELPH-MCNC: 4.3 G/DL — SIGNIFICANT CHANGE UP (ref 3.3–5)
ALP SERPL-CCNC: 107 U/L — SIGNIFICANT CHANGE UP (ref 40–120)
ALT FLD-CCNC: 30 U/L — SIGNIFICANT CHANGE UP (ref 12–78)
ANION GAP SERPL CALC-SCNC: 4 MMOL/L — LOW (ref 5–17)
AST SERPL-CCNC: 35 U/L — SIGNIFICANT CHANGE UP (ref 15–37)
BASOPHILS # BLD AUTO: 0.02 K/UL — SIGNIFICANT CHANGE UP (ref 0–0.2)
BASOPHILS NFR BLD AUTO: 0.2 % — SIGNIFICANT CHANGE UP (ref 0–2)
BILIRUB SERPL-MCNC: 0.4 MG/DL — SIGNIFICANT CHANGE UP (ref 0.2–1.2)
BUN SERPL-MCNC: 17 MG/DL — SIGNIFICANT CHANGE UP (ref 7–23)
CALCIUM SERPL-MCNC: 9.2 MG/DL — SIGNIFICANT CHANGE UP (ref 8.5–10.1)
CHLORIDE SERPL-SCNC: 107 MMOL/L — SIGNIFICANT CHANGE UP (ref 96–108)
CO2 SERPL-SCNC: 29 MMOL/L — SIGNIFICANT CHANGE UP (ref 22–31)
CREAT SERPL-MCNC: 1.17 MG/DL — SIGNIFICANT CHANGE UP (ref 0.5–1.3)
EOSINOPHIL # BLD AUTO: 0.1 K/UL — SIGNIFICANT CHANGE UP (ref 0–0.5)
EOSINOPHIL NFR BLD AUTO: 1.2 % — SIGNIFICANT CHANGE UP (ref 0–6)
ETHANOL SERPL-MCNC: <10 MG/DL — SIGNIFICANT CHANGE UP (ref 0–10)
GLUCOSE SERPL-MCNC: 107 MG/DL — HIGH (ref 70–99)
HCT VFR BLD CALC: 48.1 % — SIGNIFICANT CHANGE UP (ref 39–50)
HGB BLD-MCNC: 16.1 G/DL — SIGNIFICANT CHANGE UP (ref 13–17)
IMM GRANULOCYTES NFR BLD AUTO: 0.2 % — SIGNIFICANT CHANGE UP (ref 0–1.5)
LYMPHOCYTES # BLD AUTO: 2.31 K/UL — SIGNIFICANT CHANGE UP (ref 1–3.3)
LYMPHOCYTES # BLD AUTO: 28.7 % — SIGNIFICANT CHANGE UP (ref 13–44)
MCHC RBC-ENTMCNC: 30.2 PG — SIGNIFICANT CHANGE UP (ref 27–34)
MCHC RBC-ENTMCNC: 33.5 GM/DL — SIGNIFICANT CHANGE UP (ref 32–36)
MCV RBC AUTO: 90.2 FL — SIGNIFICANT CHANGE UP (ref 80–100)
MONOCYTES # BLD AUTO: 1.14 K/UL — HIGH (ref 0–0.9)
MONOCYTES NFR BLD AUTO: 14.1 % — HIGH (ref 2–14)
NEUTROPHILS # BLD AUTO: 4.47 K/UL — SIGNIFICANT CHANGE UP (ref 1.8–7.4)
NEUTROPHILS NFR BLD AUTO: 55.6 % — SIGNIFICANT CHANGE UP (ref 43–77)
PLATELET # BLD AUTO: 261 K/UL — SIGNIFICANT CHANGE UP (ref 150–400)
POTASSIUM SERPL-MCNC: 4.1 MMOL/L — SIGNIFICANT CHANGE UP (ref 3.5–5.3)
POTASSIUM SERPL-SCNC: 4.1 MMOL/L — SIGNIFICANT CHANGE UP (ref 3.5–5.3)
PROT SERPL-MCNC: 7.9 GM/DL — SIGNIFICANT CHANGE UP (ref 6–8.3)
RBC # BLD: 5.33 M/UL — SIGNIFICANT CHANGE UP (ref 4.2–5.8)
RBC # FLD: 12.8 % — SIGNIFICANT CHANGE UP (ref 10.3–14.5)
SODIUM SERPL-SCNC: 140 MMOL/L — SIGNIFICANT CHANGE UP (ref 135–145)
TROPONIN I SERPL-MCNC: <0.015 NG/ML — SIGNIFICANT CHANGE UP (ref 0.01–0.04)
WBC # BLD: 8.06 K/UL — SIGNIFICANT CHANGE UP (ref 3.8–10.5)
WBC # FLD AUTO: 8.06 K/UL — SIGNIFICANT CHANGE UP (ref 3.8–10.5)

## 2021-04-18 PROCEDURE — 99285 EMERGENCY DEPT VISIT HI MDM: CPT

## 2021-04-18 PROCEDURE — 0042T: CPT

## 2021-04-18 PROCEDURE — 93010 ELECTROCARDIOGRAM REPORT: CPT

## 2021-04-18 PROCEDURE — 71045 X-RAY EXAM CHEST 1 VIEW: CPT | Mod: 26

## 2021-04-18 PROCEDURE — 70496 CT ANGIOGRAPHY HEAD: CPT | Mod: 26

## 2021-04-18 PROCEDURE — 70498 CT ANGIOGRAPHY NECK: CPT | Mod: 26

## 2021-04-18 PROCEDURE — 70450 CT HEAD/BRAIN W/O DYE: CPT | Mod: 26,59

## 2021-04-18 RX ORDER — ASPIRIN/CALCIUM CARB/MAGNESIUM 324 MG
325 TABLET ORAL ONCE
Refills: 0 | Status: COMPLETED | OUTPATIENT
Start: 2021-04-18 | End: 2021-04-18

## 2021-04-18 NOTE — ED ADULT TRIAGE NOTE - CHIEF COMPLAINT QUOTE
Pt accompanied by girlfriend presents to the ED c/o dizziness, difficulty making sentences and intermittent blurred vision x 1 hour. Pt states he noticed he was having difficulty forming words. Pt A+Ox4, denies weakness, numbness/tingling. F/S 117 in triage. Seen and evaluated by Doctor mariola, CODE STROKE activated at 7539. Pt brought directly to cat scan Pt accompanied by girlfriend presents to the ED c/o dizziness, difficulty making sentences and intermittent blurred vision x 1 hour. Pt states he noticed he was having difficulty forming words. Pt A+Ox4, denies weakness, numbness/tingling. F/S 117 in triage. Seen and evaluated by Doctor marino, CODE STROKE activated at 9649. Pt brought directly to cat scan

## 2021-04-18 NOTE — ED ADULT NURSE NOTE - NS ED NURSE PATIENT LEFT UNIT TIME
allergic reaction to shrimp,ate at dinner  6:30 pm today, body rash and itchiness as per patient , mom gave claritin tablet 12:24

## 2021-04-18 NOTE — ED PROVIDER NOTE - OBJECTIVE STATEMENT
64 y/o male with PMHx of GERD, SBO, depression, HTN presents to the ED c/o dizziness, difficulty speaking onset 1 hour ago, witnessed by pt's girlfriend. Dizziness described as lightheadedness. States symptoms persist, but somewhat alleviated. Denies numbness, tingling.

## 2021-04-18 NOTE — ED ADULT NURSE NOTE - CHIEF COMPLAINT QUOTE
Pt accompanied by girlfriend presents to the ED c/o dizziness, difficulty making sentences and intermittent blurred vision x 1 hour. Pt states he noticed he was having difficulty forming words. Pt A+Ox4, denies weakness, numbness/tingling. F/S 117 in triage. Seen and evaluated by Doctor marino, CODE STROKE activated at 6010. Pt brought directly to cat scan

## 2021-04-18 NOTE — ED ADULT NURSE NOTE - NSIMPLEMENTINTERV_GEN_ALL_ED
Implemented All Universal Safety Interventions:  [place] to call system. Call bell, personal items and telephone within reach. Instruct patient to call for assistance. Room bathroom lighting operational. Non-slip footwear when patient is off stretcher. Physically safe environment: no spills, clutter or unnecessary equipment. Stretcher in lowest position, wheels locked, appropriate side rails in place.

## 2021-04-18 NOTE — ED ADULT NURSE NOTE - OBJECTIVE STATEMENT
Pt. is a 65YOM c/o dizziness, difficulty making sentences and intermittent blurred vision x 1 hour. Pt states he noticed he was having difficulty forming words. Pt. states symptoms have since improved. Pt A+Ox4, denies weakness, numbness/tingling. CODE STROKE activated at 2367. Pt brought directly to cat scan.

## 2021-04-18 NOTE — ED PROVIDER NOTE - CLINICAL SUMMARY MEDICAL DECISION MAKING FREE TEXT BOX
Pt p/w transient expressive aphasia and dizziness now resolved with an NIHSS of zero currently.  Tpa not indicated for TIA.  CT perfusion and CTA negative. Will give  mg and admit to tele

## 2021-04-18 NOTE — ED PROVIDER NOTE - CPE EDP CARDIAC NORM
Opioids not applicable/not prescribed/Side effects of pain management treatment/Safe use, storage and disposal of opioids when prescribed/Activities of daily living, including home environment that might     exacerbate pain or reduce effectiveness of the pain management plan of care as well as strategies to address these issues/Education provided on the pain management plan of care normal... Statement Selected

## 2021-04-18 NOTE — ED STATDOCS - PROGRESS NOTE DETAILS
Bernadette Kam, DO:  Called to intake area to r/o Code stroke.  This is a 66 yo M with a hx of HTN, depression, SBO, GERD presenting BIB girlfriend for 45minutes to 1 hour of dizziness and difficulty finding words. He states his speech is intermittent.  He denies head injury or headache.  Took his blood pressure at home which was 140s/80s.  Had similar episode in October 2020 related to high BP.  Denies taking blood thinnners.  No muscle weakness or numbness, difficulty walking, or change in vision.  Dizziness is described as dysequilibrium feeling in back of his head.  He denies fever, recent illness, vomiting. Code stroke called and patient sent directly to CT and then to Main ED for evaluation.

## 2021-04-19 DIAGNOSIS — G45.9 TRANSIENT CEREBRAL ISCHEMIC ATTACK, UNSPECIFIED: ICD-10-CM

## 2021-04-19 LAB
A1C WITH ESTIMATED AVERAGE GLUCOSE RESULT: 5.4 % — SIGNIFICANT CHANGE UP (ref 4–5.6)
ALBUMIN SERPL ELPH-MCNC: 3.7 G/DL — SIGNIFICANT CHANGE UP (ref 3.3–5)
ALP SERPL-CCNC: 96 U/L — SIGNIFICANT CHANGE UP (ref 40–120)
ALT FLD-CCNC: 27 U/L — SIGNIFICANT CHANGE UP (ref 12–78)
ANION GAP SERPL CALC-SCNC: 1 MMOL/L — LOW (ref 5–17)
APTT BLD: 35.3 SEC — SIGNIFICANT CHANGE UP (ref 27.5–35.5)
AST SERPL-CCNC: 21 U/L — SIGNIFICANT CHANGE UP (ref 15–37)
BASOPHILS # BLD AUTO: 0.02 K/UL — SIGNIFICANT CHANGE UP (ref 0–0.2)
BASOPHILS NFR BLD AUTO: 0.3 % — SIGNIFICANT CHANGE UP (ref 0–2)
BILIRUB SERPL-MCNC: 0.5 MG/DL — SIGNIFICANT CHANGE UP (ref 0.2–1.2)
BUN SERPL-MCNC: 13 MG/DL — SIGNIFICANT CHANGE UP (ref 7–23)
CALCIUM SERPL-MCNC: 8.6 MG/DL — SIGNIFICANT CHANGE UP (ref 8.5–10.1)
CHLORIDE SERPL-SCNC: 106 MMOL/L — SIGNIFICANT CHANGE UP (ref 96–108)
CHOLEST SERPL-MCNC: 189 MG/DL — SIGNIFICANT CHANGE UP
CO2 SERPL-SCNC: 32 MMOL/L — HIGH (ref 22–31)
COVID-19 SPIKE DOMAIN AB INTERP: POSITIVE
COVID-19 SPIKE DOMAIN ANTIBODY RESULT: >250 U/ML — HIGH
CREAT SERPL-MCNC: 1.01 MG/DL — SIGNIFICANT CHANGE UP (ref 0.5–1.3)
EOSINOPHIL # BLD AUTO: 0.1 K/UL — SIGNIFICANT CHANGE UP (ref 0–0.5)
EOSINOPHIL NFR BLD AUTO: 1.6 % — SIGNIFICANT CHANGE UP (ref 0–6)
ESTIMATED AVERAGE GLUCOSE: 108 MG/DL — SIGNIFICANT CHANGE UP (ref 68–114)
GLUCOSE SERPL-MCNC: 88 MG/DL — SIGNIFICANT CHANGE UP (ref 70–99)
HCT VFR BLD CALC: 46.5 % — SIGNIFICANT CHANGE UP (ref 39–50)
HDLC SERPL-MCNC: 49 MG/DL — SIGNIFICANT CHANGE UP
HGB BLD-MCNC: 15.5 G/DL — SIGNIFICANT CHANGE UP (ref 13–17)
IMM GRANULOCYTES NFR BLD AUTO: 0.2 % — SIGNIFICANT CHANGE UP (ref 0–1.5)
INR BLD: 1 RATIO — SIGNIFICANT CHANGE UP (ref 0.88–1.16)
INR BLD: 1.04 RATIO — SIGNIFICANT CHANGE UP (ref 0.88–1.16)
LIPID PNL WITH DIRECT LDL SERPL: 111 MG/DL — HIGH
LYMPHOCYTES # BLD AUTO: 1.83 K/UL — SIGNIFICANT CHANGE UP (ref 1–3.3)
LYMPHOCYTES # BLD AUTO: 28.4 % — SIGNIFICANT CHANGE UP (ref 13–44)
MAGNESIUM SERPL-MCNC: 2.1 MG/DL — SIGNIFICANT CHANGE UP (ref 1.6–2.6)
MCHC RBC-ENTMCNC: 30.1 PG — SIGNIFICANT CHANGE UP (ref 27–34)
MCHC RBC-ENTMCNC: 33.3 GM/DL — SIGNIFICANT CHANGE UP (ref 32–36)
MCV RBC AUTO: 90.3 FL — SIGNIFICANT CHANGE UP (ref 80–100)
MONOCYTES # BLD AUTO: 0.85 K/UL — SIGNIFICANT CHANGE UP (ref 0–0.9)
MONOCYTES NFR BLD AUTO: 13.2 % — SIGNIFICANT CHANGE UP (ref 2–14)
NEUTROPHILS # BLD AUTO: 3.63 K/UL — SIGNIFICANT CHANGE UP (ref 1.8–7.4)
NEUTROPHILS NFR BLD AUTO: 56.3 % — SIGNIFICANT CHANGE UP (ref 43–77)
NON HDL CHOLESTEROL: 140 MG/DL — HIGH
PHOSPHATE SERPL-MCNC: 3.3 MG/DL — SIGNIFICANT CHANGE UP (ref 2.5–4.5)
PLATELET # BLD AUTO: 233 K/UL — SIGNIFICANT CHANGE UP (ref 150–400)
POTASSIUM SERPL-MCNC: 3.6 MMOL/L — SIGNIFICANT CHANGE UP (ref 3.5–5.3)
POTASSIUM SERPL-SCNC: 3.6 MMOL/L — SIGNIFICANT CHANGE UP (ref 3.5–5.3)
PROT SERPL-MCNC: 7.1 GM/DL — SIGNIFICANT CHANGE UP (ref 6–8.3)
PROTHROM AB SERPL-ACNC: 11.7 SEC — SIGNIFICANT CHANGE UP (ref 10.6–13.6)
PROTHROM AB SERPL-ACNC: 12.1 SEC — SIGNIFICANT CHANGE UP (ref 10.6–13.6)
RAPID RVP RESULT: SIGNIFICANT CHANGE UP
RBC # BLD: 5.15 M/UL — SIGNIFICANT CHANGE UP (ref 4.2–5.8)
RBC # FLD: 12.7 % — SIGNIFICANT CHANGE UP (ref 10.3–14.5)
SARS-COV-2 IGG+IGM SERPL QL IA: >250 U/ML — HIGH
SARS-COV-2 IGG+IGM SERPL QL IA: POSITIVE
SARS-COV-2 RNA SPEC QL NAA+PROBE: SIGNIFICANT CHANGE UP
SODIUM SERPL-SCNC: 139 MMOL/L — SIGNIFICANT CHANGE UP (ref 135–145)
TRIGL SERPL-MCNC: 143 MG/DL — SIGNIFICANT CHANGE UP
WBC # BLD: 6.44 K/UL — SIGNIFICANT CHANGE UP (ref 3.8–10.5)
WBC # FLD AUTO: 6.44 K/UL — SIGNIFICANT CHANGE UP (ref 3.8–10.5)

## 2021-04-19 PROCEDURE — 12345: CPT | Mod: NC

## 2021-04-19 PROCEDURE — 97116 GAIT TRAINING THERAPY: CPT | Mod: GP

## 2021-04-19 PROCEDURE — 93005 ELECTROCARDIOGRAM TRACING: CPT

## 2021-04-19 PROCEDURE — 92523 SPEECH SOUND LANG COMPREHEN: CPT | Mod: GN

## 2021-04-19 PROCEDURE — 84100 ASSAY OF PHOSPHORUS: CPT

## 2021-04-19 PROCEDURE — 85730 THROMBOPLASTIN TIME PARTIAL: CPT

## 2021-04-19 PROCEDURE — 97162 PT EVAL MOD COMPLEX 30 MIN: CPT | Mod: GP

## 2021-04-19 PROCEDURE — 83036 HEMOGLOBIN GLYCOSYLATED A1C: CPT

## 2021-04-19 PROCEDURE — 36415 COLL VENOUS BLD VENIPUNCTURE: CPT

## 2021-04-19 PROCEDURE — 85025 COMPLETE CBC W/AUTO DIFF WBC: CPT

## 2021-04-19 PROCEDURE — 92610 EVALUATE SWALLOWING FUNCTION: CPT | Mod: GN

## 2021-04-19 PROCEDURE — 86769 SARS-COV-2 COVID-19 ANTIBODY: CPT

## 2021-04-19 PROCEDURE — 70551 MRI BRAIN STEM W/O DYE: CPT

## 2021-04-19 PROCEDURE — 83735 ASSAY OF MAGNESIUM: CPT

## 2021-04-19 PROCEDURE — 80061 LIPID PANEL: CPT

## 2021-04-19 PROCEDURE — 70551 MRI BRAIN STEM W/O DYE: CPT | Mod: 26

## 2021-04-19 PROCEDURE — 99222 1ST HOSP IP/OBS MODERATE 55: CPT

## 2021-04-19 PROCEDURE — 99223 1ST HOSP IP/OBS HIGH 75: CPT

## 2021-04-19 PROCEDURE — 80053 COMPREHEN METABOLIC PANEL: CPT

## 2021-04-19 PROCEDURE — 0225U NFCT DS DNA&RNA 21 SARSCOV2: CPT

## 2021-04-19 PROCEDURE — 93306 TTE W/DOPPLER COMPLETE: CPT

## 2021-04-19 PROCEDURE — 85610 PROTHROMBIN TIME: CPT

## 2021-04-19 RX ORDER — BUPROPION HYDROCHLORIDE 150 MG/1
150 TABLET, EXTENDED RELEASE ORAL DAILY
Refills: 0 | Status: DISCONTINUED | OUTPATIENT
Start: 2021-04-19 | End: 2021-04-19

## 2021-04-19 RX ORDER — ASPIRIN/CALCIUM CARB/MAGNESIUM 324 MG
81 TABLET ORAL DAILY
Refills: 0 | Status: DISCONTINUED | OUTPATIENT
Start: 2021-04-19 | End: 2021-04-20

## 2021-04-19 RX ORDER — ACETAMINOPHEN 500 MG
650 TABLET ORAL EVERY 6 HOURS
Refills: 0 | Status: DISCONTINUED | OUTPATIENT
Start: 2021-04-19 | End: 2021-04-20

## 2021-04-19 RX ORDER — AMLODIPINE BESYLATE 2.5 MG/1
5 TABLET ORAL AT BEDTIME
Refills: 0 | Status: DISCONTINUED | OUTPATIENT
Start: 2021-04-19 | End: 2021-04-20

## 2021-04-19 RX ORDER — DOXAZOSIN MESYLATE 4 MG
3 TABLET ORAL DAILY
Refills: 0 | Status: DISCONTINUED | OUTPATIENT
Start: 2021-04-19 | End: 2021-04-19

## 2021-04-19 RX ORDER — DOXAZOSIN MESYLATE 4 MG
3 TABLET ORAL DAILY
Refills: 0 | Status: DISCONTINUED | OUTPATIENT
Start: 2021-04-19 | End: 2021-04-20

## 2021-04-19 RX ORDER — ATORVASTATIN CALCIUM 80 MG/1
40 TABLET, FILM COATED ORAL AT BEDTIME
Refills: 0 | Status: DISCONTINUED | OUTPATIENT
Start: 2021-04-19 | End: 2021-04-20

## 2021-04-19 RX ORDER — BUPROPION HYDROCHLORIDE 150 MG/1
75 TABLET, EXTENDED RELEASE ORAL
Refills: 0 | Status: DISCONTINUED | OUTPATIENT
Start: 2021-04-19 | End: 2021-04-20

## 2021-04-19 RX ORDER — ONDANSETRON 8 MG/1
4 TABLET, FILM COATED ORAL EVERY 6 HOURS
Refills: 0 | Status: DISCONTINUED | OUTPATIENT
Start: 2021-04-19 | End: 2021-04-20

## 2021-04-19 RX ORDER — DOXAZOSIN MESYLATE 4 MG
1 TABLET ORAL
Qty: 0 | Refills: 0 | DISCHARGE

## 2021-04-19 RX ADMIN — Medication 3 MILLIGRAM(S): at 21:58

## 2021-04-19 RX ADMIN — AMLODIPINE BESYLATE 5 MILLIGRAM(S): 2.5 TABLET ORAL at 11:07

## 2021-04-19 RX ADMIN — Medication 325 MILLIGRAM(S): at 00:26

## 2021-04-19 RX ADMIN — BUPROPION HYDROCHLORIDE 75 MILLIGRAM(S): 150 TABLET, EXTENDED RELEASE ORAL at 21:58

## 2021-04-19 RX ADMIN — ATORVASTATIN CALCIUM 40 MILLIGRAM(S): 80 TABLET, FILM COATED ORAL at 21:58

## 2021-04-19 NOTE — SWALLOW BEDSIDE ASSESSMENT ADULT - COMMENTS
The pt was admitted to  with dizziness and difficulties finding words when attempting to speak, both of which are improving. This profile is superimposed upon a h/o anxiety, depression with suicidal intent, HTN, GERD, prior perforated appendicitis with adhesions status post multiple SBO's.

## 2021-04-19 NOTE — SWALLOW BEDSIDE ASSESSMENT ADULT - SWALLOW EVAL: CRITERIA FOR SKILLED INTERVENTION MET
DO NOT FEEL THAT ACUTE SPEECH PATHOLOGY FOLLOW UP IS WARRANTED/WOULD CHANGE CLINICAL MANAGEMENT WHILE IN HOSPITAL. PT'S SPEECH-LANGUAGE ABILITIES AND OROPHARYNGEAL SWALLOWING ABILITIES ARE WITHIN FUNCTIONAL PARAMETERS/AT REPORTED USUAL STATE. GIVEN ABOVE, WILL NOT ACTIVELY FOLLOW. RECONSULT PRN SHOULD STATUS CHANGE AND CONDITION WARRANT.

## 2021-04-19 NOTE — H&P ADULT - HISTORY OF PRESENT ILLNESS
66 y/o M w/ PMH of HTN, anxiety, depression, GERD, SBO x 3, AAA, h/o suicidal ideation, Elevated PSA (being worked up outpatient), p/w dizziness. Symptoms started around 9:30pm, patient started to feel dizzy, as if he was drunk. The feeling was coming and going as he tried to rest. Then he started to notice that he had word finding difficulty and knew what he wanted to say to his girlfriend, but took a long time to actually say it. States currently all symptoms have resolved and he is back to baseline. Denies CP, SOB, cough, runny nose, arm / leg weakness, sensory deficits, facial droop.          PSH: Abdominal surgery     Social Hx: Tobacco - in high school only, Etoh - 1 beer occasionally, drugs - denies     Family Hx: Mother - Leukemia, Father - CHF

## 2021-04-19 NOTE — CONSULT NOTE ADULT - ASSESSMENT
A/P:  66 y/o M w/ PMH of HTN, anxiety, depression, GERD, SBO x 3, AAA, h/o suicidal ideation, Elevated PSA (being worked up outpatient), p/w dizziness.     1. Dizziness. ?TIA. Neuro following. CTH negative, MRI negative, CTA neck negative for any significant findings.  Cont asa/statin.   SB/SR on tele. Can have further monitoring as outpt.   2Decho pending. JADE can done as outpt if clinically indicated.     2. HTN. Cont current outpt regimen.     3. Dyslipidemia. Cont statin for now.     4. DVT proph, outpt f/u within 1 week of D/C. 
66 y/o man w/ PMH of HTN, anxiety, depression, GERD, SBO x 3, AAA, p/w dizziness, trouble speaking. Now resolved. Exam non focal. ? TIA. On asa, statin.  Suggest:  telemetry observation  2D echo  cardiology eval  consider LT EKG monitor

## 2021-04-19 NOTE — H&P ADULT - ASSESSMENT
64 y/o M w/ PMH of HTN, anxiety, depression, GERD, SBO x 3, AAA, h/o suicidal ideation, Elevated PSA (being worked up outpatient), p/w dizziness    *Suspect TIA  -CTH - Negative for acute findings   -ASA / Statin  -Lipid panel / A1c  -Neuro consult  -Neuro checks  -MRI   -Echo  -PT consult  -Speech and swallow consult  -Hold Anti-hypertensives temporarily tonight     *H/o depression / anxiety / GERD / Elevated PSA  -C/w home meds and f/u outpatient for further management if conditions remain stable during hospitalization     *DVT ppx  -SCDs          66 y/o M w/ PMH of HTN, anxiety, depression, GERD, SBO x 3, AAA, h/o suicidal ideation, Elevated PSA (being worked up outpatient), p/w dizziness    *Suspect TIA  -CTH - Negative for acute findings   -ASA / Statin  -Lipid panel / A1c  -Neuro consult  -Neuro checks  -MRI   -Echo  -PT consult  -Speech and swallow consult  -Hold Anti-hypertensives temporarily tonight     *H/o depression / anxiety / GERD / Elevated PSA  -C/w home meds and f/u outpatient for further management if conditions remain stable during hospitalization     *DVT ppx  -SCDs          64 y/o M w/ PMH of HTN, anxiety, depression, GERD, SBO x 3, AAA, h/o suicidal ideation, Elevated PSA (being worked up outpatient), p/w dizziness    *Suspect TIA  -CTH - Negative for acute findings   -ASA / Statin  -Lipid panel / A1c  -Neuro consult  -Neuro checks  -MRI   -Echo  -PT consult  -Speech and swallow consult  -Hold Anti-hypertensives temporarily tonight     *H/o depression / anxiety / GERD / Elevated PSA  -C/w home meds and f/u outpatient for further management if conditions remain stable during hospitalization     *DVT ppx  -SCDs     IMPROVE VTE Individual Risk Assessment    RISK                                                                Points    [  ] Previous VTE                                                  3    [  ] Thrombophilia                                               2    [  ] Lower limb paralysis                                      2        (unable to hold up >15 seconds)      [  ] Current Cancer                                              2         (within 6 months)    [  ] Immobilization > 24 hrs                                1    [  ] ICU/CCU stay > 24 hours                              1    [1  ] Age > 60                                                      1    IMPROVE VTE Score ____1___    IMPROVE Score 0-1: Low Risk, No VTE prophylaxis required for most patients, encourage ambulation.   IMPROVE Score 2-3: At risk, pharmacologic VTE prophylaxis is indicated for most patients (in the absence of a contraindication)  IMPROVE Score > or = 4: High Risk, pharmacologic VTE prophylaxis is indicated for most patients (in the absence of a contraindication)

## 2021-04-19 NOTE — SWALLOW BEDSIDE ASSESSMENT ADULT - SWALLOW EVAL: DIAGNOSIS
1) The pt demonstrates Oropharyngeal Swallowing abilities which subjectively appears to be within functional parameters for age. NO dysphagia, behavioral aspiration signs or odynophagia were noted on exam.  2) Pt was alert and interactive. He was somewhat anxious. Pt reported a transient episode where it was "difficult to say which I was thinking", which precipitated this admission but resolved. At the present time, pt was oriented x3+. Pt was able to verbalize during communicative probes via intelligible, fluent, linguistically intact utterances. His motor speech and language abilities are functional. Pt able to verbalize needs and feels he is at communicative baseline.

## 2021-04-19 NOTE — CONSULT NOTE ADULT - SUBJECTIVE AND OBJECTIVE BOX
Cardiology Consultation    HPI: 66 y/o M w/ PMH of HTN, anxiety, depression, GERD, SBO x 3, AAA, h/o suicidal ideation, Elevated PSA (being worked up outpatient), p/w dizziness. Symptoms started around 9:30pm, patient started to feel dizzy, as if he was drunk. The feeling was coming and going as he tried to rest. Then he started to notice that he had word finding difficulty and knew what he wanted to say to his girlfriend, but took a long time to actually say it. States currently all symptoms have resolved and he is back to baseline. Denies CP, SOB, cough, runny nose, arm / leg weakness, sensory deficits, facial droop.      4/19. Chart reviewed. P/w dizziness, brief word finding difficulty.   CTH, MRI negative. No events on tele. No CP/SOB.  No h/o afib, CVA.  SR/SB on tele.     PSH: Abdominal surgery     Social Hx: Tobacco - in high school only, Etoh - 1 beer occasionally, drugs - denies     Family Hx: Mother - Leukemia, Father - CHF  (19 Apr 2021 00:50)    PAST MEDICAL & SURGICAL HISTORY:  HTN (hypertension)  Suicidal intent  Depression  Hypertension  Depression  Small bowel obstruction  SBO (small bowel obstruction)  GERD (gastroesophageal reflux disease)  Abdominal adhesions  9/18/2014 laprascopic lysis of adhesions  Perforated appendicitis  SBO (small bowel obstruction)  x 3  Abdominal adhesions    llergies    erythromycin (Unknown)  Zoloft (Hypotension)    MEDICATIONS  (STANDING):  amLODIPine   Tablet 5 milliGRAM(s) Oral at bedtime  aspirin  chewable 81 milliGRAM(s) Oral daily  atorvastatin 40 milliGRAM(s) Oral at bedtime  buPROPion XL . 150 milliGRAM(s) Oral daily  doxazosin 3 milliGRAM(s) Oral daily    MEDICATIONS  (PRN):  acetaminophen   Tablet .. 650 milliGRAM(s) Oral every 6 hours PRN Mild Pain (1 - 3)  ondansetron Injectable 4 milliGRAM(s) IV Push every 6 hours PRN Nausea and/or Vomiting      Vital Signs Last 24 Hrs  T(C): 36.8 (19 Apr 2021 06:49), Max: 36.9 (18 Apr 2021 22:48)  T(F): 98.3 (19 Apr 2021 06:49), Max: 98.5 (19 Apr 2021 03:56)  HR: 54 (19 Apr 2021 06:49) (54 - 68)  BP: 134/80 (19 Apr 2021 11:06) (130/83 - 157/100)  BP(mean): 94 (19 Apr 2021 11:06) (94 - 106)  RR: 16 (19 Apr 2021 06:49) (15 - 16)  SpO2: 96% (19 Apr 2021 06:49) (95% - 96%)    REVIEW OF SYSTEMS:    CONSTITUTIONAL:  As per HPI.  HEENT:  Eyes:  No diplopia or blurred vision. ENT:  No earache, sore throat or runny nose.  CARDIOVASCULAR:  No pressure, squeezing, strangling, tightness, heaviness or aching about the chest, neck, axilla or epigastrium.  RESPIRATORY:  No cough, shortness of breath, PND or orthopnea.  GASTROINTESTINAL:  No nausea, vomiting or diarrhea.  GENITOURINARY:  No dysuria, frequency or urgency.  MUSCULOSKELETAL:  As per HPI.  SKIN:  No change in skin, hair or nails.  NEUROLOGIC:  No paresthesias, fasciculations, seizures or weakness. Dizziness, brief word finding difficulty.    PHYSICAL EXAMINATION:    GENERAL APPEARANCE:  Pt. is not currently dyspneic, in no distress. Pt. is alert, oriented, and pleasant.  HEENT:  Pupils are normal and react normally. No icterus. Mucous membranes well colored.  NECK:  Supple. No lymphadenopathy. Jugular venous pressure not elevated. Carotids equal.   HEART:   The cardiac impulse has a normal quality. There are no murmurs, rubs or gallops noted  CHEST:  Chest is clear to auscultation. Normal respiratory effort.  ABDOMEN:  Soft and nontender.   EXTREMITIES:  There is no edema.     LABS:                        15.5   6.44  )-----------( 233      ( 19 Apr 2021 06:33 )             46.5     04-19    139  |  106  |  13  ----------------------------<  88  3.6   |  32<H>  |  1.01    Ca    8.6      19 Apr 2021 06:33  Phos  3.3     04-19  Mg     2.1     04-19    TPro  7.1  /  Alb  3.7  /  TBili  0.5  /  DBili  x   /  AST  21  /  ALT  27  /  AlkPhos  96  04-19    LIVER FUNCTIONS - ( 19 Apr 2021 06:33 )  Alb: 3.7 g/dL / Pro: 7.1 gm/dL / ALK PHOS: 96 U/L / ALT: 27 U/L / AST: 21 U/L / GGT: x           PT/INR - ( 19 Apr 2021 06:33 )   PT: 12.1 sec;   INR: 1.04 ratio         PTT - ( 19 Apr 2021 06:33 )  PTT:35.3 sec  CARDIAC MARKERS ( 18 Apr 2021 22:55 )  <0.015 ng/mL / x     / x     / x     / x        EKG: SB, no dynamic ST changes.    TELEMETRY: SR    CARDIAC TESTS: pending    RADIOLOGY & ADDITIONAL STUDIES: < from: MR Head No Cont (04.19.21 @ 09:40) >  IMPRESSION: No acute infarct, hemorrhage, or mass effect.  < end of copied text >  < from: CT Angio Neck w/ IV Cont (04.18.21 @ 23:11) >  IMPRESSION:  CT brain perfusion: No ischemic penumbra.  CTA head and neck: Patent cervical and intracranial major arterial vasculature without evidence of abrupt vessel cut off, hemodynamically significant stenosis, aneurysm, or dissection.    ASSESSMENT & PLAN:
Neurology Consult requested by:   Patient is a 65y old  Male who presents with a chief complaint of dizziness (19 Apr 2021 00:50)     HPI:  64 y/o M w/ PMH of HTN, anxiety, depression, GERD, SBO x 3, AAA, p/w dizziness, difficulty speaking, thinking out words. Symptoms started around 9:30pm last night, patient started to feel dizzy, as if he was drunk. The feeling was coming and going as he tried to rest. Then he started to notice that he had word finding difficulty and knew what he wanted to say to his girlfriend, but took a long time to actually say it. States currently all symptoms have resolved and he is back to baseline. Denies CP, SOB, cough, runny nose, arm / leg weakness, sensory deficits, facial droop.          PSH: Abdominal surgery     Social Hx: Tobacco - in high school only, Etoh - 1 beer occasionally, drugs - denies     Family Hx: Mother - Leukemia, Father - CHF  (19 Apr 2021 00:50)      PAST MEDICAL & SURGICAL HISTORY:  HTN (hypertension)    Suicidal intent    Depression    Hypertension    Depression    Small bowel obstruction    SBO (small bowel obstruction)    GERD (gastroesophageal reflux disease)    Abdominal adhesions  9/18/2014 laprascopic lysis of adhesions    Perforated appendicitis    SBO (small bowel obstruction)  x 3    Abdominal adhesions      FAMILY HISTORY:  Family history of hypertension (Father)  parent      Social Hx:  Nonsmoker, no drug or alcohol use  Medications and Allergies ReviewedMEDICATIONS  (STANDING):  amLODIPine   Tablet 5 milliGRAM(s) Oral at bedtime  aspirin  chewable 81 milliGRAM(s) Oral daily  atorvastatin 40 milliGRAM(s) Oral at bedtime  buPROPion XL . 150 milliGRAM(s) Oral daily  doxazosin 3 milliGRAM(s) Oral daily     ROS: Pertinent positives in HPI, all other ROS were reviewed and are negative.      Examination:   Vital Signs Last 24 Hrs  T(C): 36.8 (19 Apr 2021 06:49), Max: 36.9 (18 Apr 2021 22:48)  T(F): 98.3 (19 Apr 2021 06:49), Max: 98.5 (19 Apr 2021 03:56)  HR: 54 (19 Apr 2021 06:49) (54 - 68)  BP: 134/80 (19 Apr 2021 11:06) (130/83 - 157/100)  BP(mean): 94 (19 Apr 2021 11:06) (94 - 106)  RR: 16 (19 Apr 2021 06:49) (15 - 16)  SpO2: 96% (19 Apr 2021 06:49) (95% - 96%)  General: Cooperative, NAD   NECK: supple, no masses  ENT: Normal hearing   Vascular : no carotid bruits,   Lungs: CTAB  Chest: RRR, no murmurs  Extremities: nontender, no edema  Musculoskeletal: no adventitious movements, no joint stiffness  Skin: no rash    Neurological Examination:  NIHSS:0  MS: AOx3. Appropriately interactive, normal affect. Speech fluent w/o paraphasic error, repetition, naming, reading is intact   CN: VFFTC, PERLL, EOMI, V1-3 sensation intact, face symmetric, hearing intact, palate elevates symmetrically, tongue midline, SCM equal bilaterally  Motor: normal bulk and tone, no tremor, rigidity or bradykinesia.  5/5 all over   Sens: Intact to light touch.    Reflexes: 2/4 all over, downgoing toes b/l  Coord:  No dysmetria, DESMOND intact   Gait: Normal gait      Complete Blood Count + Automated Diff (04.19.21 @ 06:33)   WBC Count: 6.44 K/uL   RBC Count: 5.15 M/uL   Hemoglobin: 15.5 g/dL   Hematocrit: 46.5 %   Mean Cell Volume: 90.3 fl   Mean Cell Hemoglobin: 30.1 pg   Mean Cell Hemoglobin Conc: 33.3 gm/dL   Red Cell Distrib Width: 12.7 %   Platelet Count - Automated: 233 K/uL     Comprehensive Metabolic Panel (04.19.21 @ 06:33)   Sodium, Serum: 139 mmol/L   Potassium, Serum: 3.6 mmol/L   Chloride, Serum: 106 mmol/L   Carbon Dioxide, Serum: 32 mmol/L   Anion Gap, Serum: 1 mmol/L   Blood Urea Nitrogen, Serum: 13 mg/dL   Creatinine, Serum: 1.01 mg/dL   Glucose, Serum: 88 mg/dL   Calcium, Total Serum: 8.6 mg/dL   Protein Total, Serum: 7.1 gm/dL   Albumin, Serum: 3.7 g/dL   Bilirubin Total, Serum: 0.5 mg/dL   Alkaline Phosphatase, Serum: 96 U/L   Aspartate Aminotransferase (AST/SGOT): 21 U/L   Alanine Aminotransferase (ALT/SGPT): 27 U/L   eGFR if Non : 78    < from: CT Angio Head w/ IV Cont (04.18.21 @ 23:11) >  FINDINGS:    CT brain perfusion:  There is symmetric cerebral blood flow, volume and mean transit times on the perfusion maps. No ischemic penumbra is demonstrated.    Perfusion parameters are reported as follows:    CBF < 30%: 0  TMax > 6s: 0  Mismatch volume: 0  Mismatch ratio: 0      CTA head and neck:  Conventional arch anatomy. Great vessel origins are patent. Innominate and visualized subclavian arteries are patent.    Anterior circulation:  The common carotid arteries are unremarkable.    The carotid bifurcations are patent without significant stenosis. The internal carotid arteries are patent without significant stenosis.    The anterior and middle cerebral arteries are patent without significant stenosis.    Posterior circulation:  Bilateral vertebral arteries are unremarkable from their origins to their intracranial segments.    The basilar artery is unremarkable. The posterior cerebral arteries are patent without significant stenosis.    Other:  No enlarged cervical adenopathy by size criteria.    The visualized lung apices are clear.    No acute osseous abnormality.    IMPRESSION:  CT brain perfusion: No ischemic penumbra.    CTA head and neck: Patent cervical and intracranial major arterial vasculature without evidence of abrupt vessel cut off, hemodynamically significant stenosis, aneurysm, or dissection.    < end of copied text >      < from: MR Head No Cont (04.19.21 @ 09:40) >  EXAM:  MR BRAIN                            PROCEDURE DATE:  04/19/2021          INTERPRETATION:  CLINICAL INFORMATION: dizziness. -. Admitting Dxs: G45.9 TRANSIENT CEREBRAL ISCHEMIC ATTACK, UNSPECIFIED.    TECHNIQUE: Multiplanar, multisequence brain MRI was performed without intravenous contrast    COMPARISON: CT head 4/18/2021.    FINDINGS:    There is no evidence of acute infarct. There are no foci of susceptibility artifact to suggest hemorrhage. The ventricles are normal in size for patient's age.    Flow voids of the major intracranial vessels at the skull base follow expected course and contour.    Right maxillary sinus polyp or retention cyst. The mastoids demonstrate no signal abnormality.  Bilateral orbits are within normal limits.      IMPRESSION: No acute infarct, hemorrhage, or mass effect.    < end of copied text >

## 2021-04-19 NOTE — SWALLOW BEDSIDE ASSESSMENT ADULT - SLP GENERAL OBSERVATIONS
Pt was alert and interactive. He was somewhat anxious. Pt reported a transient episode where it was "difficult to say which I was thinking", which precipitated this admission but resolved. At the present time, pt was oriented x3+. Pt was able to verbalize during communicative probes via intelligible, fluent, linguistically intact utterances. His motor speech and language abilities are functional. Pt able to verbalize needs and feels he is at communicative baseline.

## 2021-04-20 ENCOUNTER — TRANSCRIPTION ENCOUNTER (OUTPATIENT)
Age: 66
End: 2021-04-20

## 2021-04-20 VITALS
RESPIRATION RATE: 18 BRPM | TEMPERATURE: 98 F | OXYGEN SATURATION: 94 % | DIASTOLIC BLOOD PRESSURE: 86 MMHG | HEART RATE: 58 BPM | SYSTOLIC BLOOD PRESSURE: 135 MMHG

## 2021-04-20 PROCEDURE — 93306 TTE W/DOPPLER COMPLETE: CPT | Mod: 26

## 2021-04-20 PROCEDURE — 99239 HOSP IP/OBS DSCHRG MGMT >30: CPT

## 2021-04-20 PROCEDURE — 93010 ELECTROCARDIOGRAM REPORT: CPT

## 2021-04-20 RX ORDER — ATORVASTATIN CALCIUM 80 MG/1
1 TABLET, FILM COATED ORAL
Qty: 60 | Refills: 1
Start: 2021-04-20 | End: 2021-08-17

## 2021-04-20 RX ORDER — ASPIRIN/CALCIUM CARB/MAGNESIUM 324 MG
1 TABLET ORAL
Qty: 0 | Refills: 0 | DISCHARGE
Start: 2021-04-20

## 2021-04-20 RX ADMIN — BUPROPION HYDROCHLORIDE 75 MILLIGRAM(S): 150 TABLET, EXTENDED RELEASE ORAL at 09:26

## 2021-04-20 RX ADMIN — AMLODIPINE BESYLATE 5 MILLIGRAM(S): 2.5 TABLET ORAL at 09:29

## 2021-04-20 RX ADMIN — Medication 81 MILLIGRAM(S): at 09:26

## 2021-04-20 NOTE — DISCHARGE NOTE PROVIDER - CARE PROVIDER_API CALL
Buck Levin (DO)  Cardiology  [address] [place] [address]  Phone: [phone]  Fax: [phone]  Follow Up Time: 2 weeks

## 2021-04-20 NOTE — DISCHARGE NOTE PROVIDER - NSDCMRMEDTOKEN_GEN_ALL_CORE_FT
Show Cerave Line: Yes Initiate Regimen: prednisone 10 mg tablet; Take 4 tabs x 3 days, 3 tabs x 3 days, 2 tabs x 3 days, and 1 tab x 3 days Detail Level: Zone Action 4: Continue aspirin 81 mg oral tablet, chewable: 1 tab(s) orally once a day  doxazosin 2 mg oral tablet: 1.5 tab(s) orally once a day  Norvasc 5 mg oral tablet: 1 tab(s) orally once a day in the morning  Wellbutrin 75 mg oral tablet: orally 2 times a day   aspirin 81 mg oral tablet, chewable: 1 tab(s) orally once a day  atorvastatin 40 mg oral tablet: 1 tab(s) orally once a day (at bedtime)  doxazosin 2 mg oral tablet: 1.5 tab(s) orally once a day  Norvasc 5 mg oral tablet: 1 tab(s) orally once a day in the morning  Wellbutrin 75 mg oral tablet: orally 2 times a day

## 2021-04-20 NOTE — DISCHARGE NOTE PROVIDER - NSDCCPCAREPLAN_GEN_ALL_CORE_FT
PRINCIPAL DISCHARGE DIAGNOSIS  Diagnosis: TIA (transient ischemic attack)  Assessment and Plan of Treatment:

## 2021-04-20 NOTE — DISCHARGE NOTE NURSING/CASE MANAGEMENT/SOCIAL WORK - PATIENT PORTAL LINK FT
You can access the FollowMyHealth Patient Portal offered by [facility] by registering at the following website: [email or web address]. By joining [email or web address]’s FollowMyHealth portal, you will also be able to view your health information using other applications (apps) compatible with our system.

## 2021-04-20 NOTE — DISCHARGE NOTE PROVIDER - HOSPITAL COURSE
FROM ADMISSION H+P:   HPI:  66 y/o M w/ PMH of HTN, anxiety, depression, GERD, SBO x 3, AAA, h/o suicidal ideation, Elevated PSA (being worked up outpatient), p/w dizziness. Symptoms started around 9:30pm, patient started to feel dizzy, as if he was drunk. The feeling was coming and going as he tried to rest. Then he started to notice that he had word finding difficulty and knew what he wanted to say to his girlfriend, but took a long time to actually say it. States currently all symptoms have resolved and he is back to baseline. Denies CP, SOB, cough, runny nose, arm / leg weakness, sensory deficits, facial droop.        ---  HOSPITAL COURSE:       Vital Signs Last 24 Hrs  T(C): 36.7 (20 Apr 2021 08:20), Max: 36.7 (20 Apr 2021 08:20)  T(F): 98 (20 Apr 2021 08:20), Max: 98 (20 Apr 2021 08:20)  HR: 58 (20 Apr 2021 08:20) (50 - 58)  BP: 135/86 (20 Apr 2021 08:20) (129/73 - 135/86)  BP(mean): 94 (19 Apr 2021 11:06) (94 - 94)  RR: 18 (20 Apr 2021 08:20) (16 - 18)  SpO2: 94% (20 Apr 2021 08:20) (94% - 98%)  ---  CONSULTANTS:   neuro, cardio  ---  TIME SPENT:  I, the attending physician, was physically present for the key portions of the evaluation and management (E/M) service provided. The total amount of time spent reviewing the hospital notes, laboratory values, imaging findings, assessing/counseling the patient, discussing with consultant physicians, social work, nursing staff was -80 minutes       FROM ADMISSION H+P:   HPI:  66 y/o M w/ PMH of HTN, anxiety, depression, GERD, SBO x 3, AAA, h/o suicidal ideation, Elevated PSA (being worked up outpatient), p/w dizziness. Symptoms started around 9:30pm, patient started to feel dizzy, as if he was drunk. The feeling was coming and going as he tried to rest. Then he started to notice that he had word finding difficulty and knew what he wanted to say to his girlfriend, but took a long time to actually say it. States currently all symptoms have resolved and he is back to baseline. Denies CP, SOB, cough, runny nose, arm / leg weakness, sensory deficits, facial droop.        ---  HOSPITAL COURSE:   His symptoms resolved and did not recur.  He was admitted for TIA work up which is negative.  He was seen by neuro and cardio and cleared for discharge.   CTH, MRI negative. No events on tele. No CP/SOB.    pt started on statin, s/e explained to him and made aware to f/u w/ his PMD for regular blood work and evaluation.  He verbalized agreement.      Vital Signs Last 24 Hrs  T(C): 36.7 (20 Apr 2021 08:20), Max: 36.7 (20 Apr 2021 08:20)  T(F): 98 (20 Apr 2021 08:20), Max: 98 (20 Apr 2021 08:20)  HR: 58 (20 Apr 2021 08:20) (50 - 58)  BP: 135/86 (20 Apr 2021 08:20) (129/73 - 135/86)  BP(mean): 94 (19 Apr 2021 11:06) (94 - 94)  RR: 18 (20 Apr 2021 08:20) (16 - 18)  SpO2: 94% (20 Apr 2021 08:20) (94% - 98%)  GENERAL APPEARANCE:  Pt. is not currently dyspneic, in no distress. Pt. is alert, oriented, and pleasant.  HEENT:  Pupils are normal and react normally. No icterus. Mucous membranes well colored.  NECK:  Supple. No lymphadenopathy. Jugular venous pressure not elevated. Carotids equal.   HEART:   The cardiac impulse has a normal quality. There are no murmurs, rubs or gallops noted  CHEST:  Chest is clear to auscultation. Normal respiratory effort.  ABDOMEN:  Soft and nontender.   EXTREMITIES:  There is no edema.     a/p  1. Dizziness. suspect TIA. Neuro following. CTH negative, MRI negative, CTA neck negative for any significant findings.  Cont asa/statin.   SB/SR on tele. Can have further monitoring as outpt.   2Decho/ JADE can done as outpt if clinically indicated.     2. HTN. Cont current outpt regimen.     3. Dyslipidemia. Cont statin for now.       CONSULTANTS:   neuro, cardio  ---  TIME SPENT:  I, the attending physician, was physically present for the key portions of the evaluation and management (E/M) service provided. The total amount of time spent reviewing the hospital notes, laboratory values, imaging findings, assessing/counseling the patient, discussing with consultant physicians, social work, nursing staff was -80 minutes

## 2021-04-20 NOTE — PROGRESS NOTE ADULT - ASSESSMENT
A/P:  66 y/o M w/ PMH of HTN, anxiety, depression, GERD, SBO x 3, AAA, h/o suicidal ideation, Elevated PSA (being worked up outpatient), p/w dizziness.     1. Dizziness. ?TIA. Neuro following. CTH negative, MRI negative, CTA neck negative for any significant findings.  Cont asa/statin.   SB/SR on tele. Can have further monitoring as outpt.   2Decho pending- this can be done as outpt.   JADE can done as outpt if clinically indicated.     2. HTN. Cont current outpt regimen.     3. Dyslipidemia. Cont statin for now.     4. DVT proph, outpt f/u within 1 week of D/C. D/C planning for today.

## 2021-04-20 NOTE — PROGRESS NOTE ADULT - SUBJECTIVE AND OBJECTIVE BOX
Cardiology Progress Note    HPI: 66 y/o M w/ PMH of HTN, anxiety, depression, GERD, SBO x 3, AAA, h/o suicidal ideation, Elevated PSA (being worked up outpatient), p/w dizziness. Symptoms started around 9:30pm, patient started to feel dizzy, as if he was drunk. The feeling was coming and going as he tried to rest. Then he started to notice that he had word finding difficulty and knew what he wanted to say to his girlfriend, but took a long time to actually say it. States currently all symptoms have resolved and he is back to baseline. Denies CP, SOB, cough, runny nose, arm / leg weakness, sensory deficits, facial droop.      4/19. Chart reviewed. P/w dizziness, brief word finding difficulty.   CTH, MRI negative. No events on tele. No CP/SOB.  No h/o afib, CVA.  SR/SB on tele.     4/20. Feels well this AM. No CP/SOB. No dizziness.   SR on tele. No events last pm.     PSH: Abdominal surgery     Social Hx: Tobacco - in high school only, Etoh - 1 beer occasionally, drugs - denies     Family Hx: Mother - Leukemia, Father - CHF  (19 Apr 2021 00:50)    PAST MEDICAL & SURGICAL HISTORY:  HTN (hypertension)  Suicidal intent  Depression  Hypertension  Depression  Small bowel obstruction  SBO (small bowel obstruction)  GERD (gastroesophageal reflux disease)  Abdominal adhesions  9/18/2014 laprascopic lysis of adhesions  Perforated appendicitis  SBO (small bowel obstruction)  x 3  Abdominal adhesions    llergies    erythromycin (Unknown)  Zoloft (Hypotension)    MEDICATIONS  (STANDING):  amLODIPine   Tablet 5 milliGRAM(s) Oral at bedtime  aspirin  chewable 81 milliGRAM(s) Oral daily  atorvastatin 40 milliGRAM(s) Oral at bedtime  buPROPion XL . 150 milliGRAM(s) Oral daily  doxazosin 3 milliGRAM(s) Oral daily    MEDICATIONS  (PRN):  acetaminophen   Tablet .. 650 milliGRAM(s) Oral every 6 hours PRN Mild Pain (1 - 3)  ondansetron Injectable 4 milliGRAM(s) IV Push every 6 hours PRN Nausea and/or Vomiting      Vital Signs Last 24 Hrs  T(C): 36.8 (19 Apr 2021 06:49), Max: 36.9 (18 Apr 2021 22:48)  T(F): 98.3 (19 Apr 2021 06:49), Max: 98.5 (19 Apr 2021 03:56)  HR: 54 (19 Apr 2021 06:49) (54 - 68)  BP: 134/80 (19 Apr 2021 11:06) (130/83 - 157/100)  BP(mean): 94 (19 Apr 2021 11:06) (94 - 106)  RR: 16 (19 Apr 2021 06:49) (15 - 16)  SpO2: 96% (19 Apr 2021 06:49) (95% - 96%)    REVIEW OF SYSTEMS:    CONSTITUTIONAL:  As per HPI.  HEENT:  Eyes:  No diplopia or blurred vision. ENT:  No earache, sore throat or runny nose.  CARDIOVASCULAR:  No pressure, squeezing, strangling, tightness, heaviness or aching about the chest, neck, axilla or epigastrium.  RESPIRATORY:  No cough, shortness of breath, PND or orthopnea.  GASTROINTESTINAL:  No nausea, vomiting or diarrhea.  GENITOURINARY:  No dysuria, frequency or urgency.  MUSCULOSKELETAL:  As per HPI.  SKIN:  No change in skin, hair or nails.  NEUROLOGIC:  No paresthesias, fasciculations, seizures or weakness. Dizziness, brief word finding difficulty.    PHYSICAL EXAMINATION:    GENERAL APPEARANCE:  Pt. is not currently dyspneic, in no distress. Pt. is alert, oriented, and pleasant.  HEENT:  Pupils are normal and react normally. No icterus. Mucous membranes well colored.  NECK:  Supple. No lymphadenopathy. Jugular venous pressure not elevated. Carotids equal.   HEART:   The cardiac impulse has a normal quality. There are no murmurs, rubs or gallops noted  CHEST:  Chest is clear to auscultation. Normal respiratory effort.  ABDOMEN:  Soft and nontender.   EXTREMITIES:  There is no edema.     LABS:                        15.5   6.44  )-----------( 233      ( 19 Apr 2021 06:33 )             46.5     04-19    139  |  106  |  13  ----------------------------<  88  3.6   |  32<H>  |  1.01    Ca    8.6      19 Apr 2021 06:33  Phos  3.3     04-19  Mg     2.1     04-19    TPro  7.1  /  Alb  3.7  /  TBili  0.5  /  DBili  x   /  AST  21  /  ALT  27  /  AlkPhos  96  04-19    LIVER FUNCTIONS - ( 19 Apr 2021 06:33 )  Alb: 3.7 g/dL / Pro: 7.1 gm/dL / ALK PHOS: 96 U/L / ALT: 27 U/L / AST: 21 U/L / GGT: x           PT/INR - ( 19 Apr 2021 06:33 )   PT: 12.1 sec;   INR: 1.04 ratio         PTT - ( 19 Apr 2021 06:33 )  PTT:35.3 sec  CARDIAC MARKERS ( 18 Apr 2021 22:55 )  <0.015 ng/mL / x     / x     / x     / x        EKG: SB, no dynamic ST changes.    TELEMETRY: SR    CARDIAC TESTS: pending    RADIOLOGY & ADDITIONAL STUDIES: < from: MR Head No Cont (04.19.21 @ 09:40) >  IMPRESSION: No acute infarct, hemorrhage, or mass effect.  < end of copied text >  < from: CT Angio Neck w/ IV Cont (04.18.21 @ 23:11) >  IMPRESSION:  CT brain perfusion: No ischemic penumbra.  CTA head and neck: Patent cervical and intracranial major arterial vasculature without evidence of abrupt vessel cut off, hemodynamically significant stenosis, aneurysm, or dissection.    ASSESSMENT & PLAN:

## 2021-04-20 NOTE — PHYSICAL THERAPY INITIAL EVALUATION ADULT - PERTINENT HX OF CURRENT PROBLEM, REHAB EVAL
while trying to explain something to his girlfriend could not get out complete sentence,say the words he wanted to ; GF noticed expressive speech difficulty and insisted on taking pt to ED

## 2021-04-20 NOTE — PHYSICAL THERAPY INITIAL EVALUATION ADULT - ACTIVE RANGE OF MOTION EXAMINATION, REHAB EVAL
kori. upper extremity Active ROM was WNL (within normal limits)/bilateral lower extremity Active ROM was WNL (within normal limits)

## 2021-04-20 NOTE — PHYSICAL THERAPY INITIAL EVALUATION ADULT - PATIENT PROFILE REVIEW, REHAB EVAL
MRI brain NEGATIVE for acute pathology; pt is overseeing care of his 92 yr old father (who is s/p fall,FN fx and subsequent hip hemiarthroplasty on his return home from rehab and now receiving HC services)/yes

## 2021-04-20 NOTE — PHYSICAL THERAPY INITIAL EVALUATION ADULT - GENERAL OBSERVATIONS, REHAB EVAL
Pre-Operative Progress Note


H&P Reviewed


The H&P was reviewed, patient examined and no changes noted.


Date Seen by Provider:  Aug 29, 2017


Time Seen by Provider:  06:53


Date H&P Reviewed:  Aug 29, 2017


Time H&P Reviewed:  06:53


Pre-Operative Diagnosis:  RT RENAL STONE, POSSIBLE RT URETERAL STONE











TAWIL,ELIAS A MD Aug 29, 2017 6:54 am
supine in bed dressed in jeans ,socks,+facial symmetry,HM in place ,Amrbose actively and spontaneously ,awake,alert,Ox4 with clear/fluent speech

## 2021-04-27 ENCOUNTER — OUTPATIENT (OUTPATIENT)
Dept: OUTPATIENT SERVICES | Facility: HOSPITAL | Age: 66
LOS: 1 days | End: 2021-04-27
Payer: COMMERCIAL

## 2021-04-27 DIAGNOSIS — K66.0 PERITONEAL ADHESIONS (POSTPROCEDURAL) (POSTINFECTION): Chronic | ICD-10-CM

## 2021-04-27 DIAGNOSIS — K56.69 OTHER INTESTINAL OBSTRUCTION: Chronic | ICD-10-CM

## 2021-04-27 DIAGNOSIS — Z20.828 CONTACT WITH AND (SUSPECTED) EXPOSURE TO OTHER VIRAL COMMUNICABLE DISEASES: ICD-10-CM

## 2021-04-27 DIAGNOSIS — K35.2 ACUTE APPENDICITIS WITH GENERALIZED PERITONITIS: Chronic | ICD-10-CM

## 2021-04-27 LAB — SARS-COV-2 RNA SPEC QL NAA+PROBE: SIGNIFICANT CHANGE UP

## 2021-04-27 PROCEDURE — C9803: CPT

## 2021-04-27 PROCEDURE — U0003: CPT

## 2021-04-27 PROCEDURE — U0005: CPT

## 2021-04-28 DIAGNOSIS — Z20.828 CONTACT WITH AND (SUSPECTED) EXPOSURE TO OTHER VIRAL COMMUNICABLE DISEASES: ICD-10-CM

## 2021-04-29 ENCOUNTER — TRANSCRIPTION ENCOUNTER (OUTPATIENT)
Age: 66
End: 2021-04-29

## 2021-04-29 RX ORDER — DOXAZOSIN MESYLATE 4 MG
1.5 TABLET ORAL
Qty: 0 | Refills: 0 | DISCHARGE

## 2021-04-29 NOTE — ASU PATIENT PROFILE, ADULT - HARM RISK FACTORS
Past Medical History:   Diagnosis Date    Asthma     Hypertension        Past Surgical History:   Procedure Laterality Date    MOUTH SURGERY      Ransom teeth       Review of patient's allergies indicates:   Allergen Reactions    Onion Itching       No current facility-administered medications on file prior to encounter.      Current Outpatient Medications on File Prior to Encounter   Medication Sig    albuterol (ACCUNEB) 0.63 mg/3 mL Nebu Take 0.63 mg by nebulization every 6 (six) hours as needed.       Family History     Problem Relation (Age of Onset)    Arthritis Mother    Hypertension Mother, Sister        Tobacco Use    Smoking status: Current Some Day Smoker     Packs/day: 0.25     Years: 10.00     Pack years: 2.50     Types: Cigarettes   Substance and Sexual Activity    Alcohol use: Not on file    Drug use: No    Sexual activity: Yes     Review of Systems   Constitutional: Positive for activity change, appetite change and fatigue. Negative for fever.   HENT: Negative for congestion, ear pain and postnasal drip.    Eyes: Negative for discharge.   Respiratory: Negative for apnea, shortness of breath and wheezing.    Cardiovascular: Negative for chest pain and leg swelling.   Gastrointestinal: Positive for abdominal pain, nausea and vomiting. Negative for abdominal distention.   Endocrine: Negative for polydipsia, polyphagia and polyuria.   Genitourinary: Negative for difficulty urinating, flank pain, frequency, hematuria and urgency.   Musculoskeletal: Negative for arthralgias and joint swelling.   Skin: Negative for pallor and rash.   Allergic/Immunologic: Negative for environmental allergies and food allergies.   Neurological: Negative for dizziness, speech difficulty, weakness, light-headedness and headaches.   Hematological: Does not bruise/bleed easily.   Psychiatric/Behavioral: Negative for agitation.     Objective:     Vital Signs (Most Recent):  Temp: 97.8 °F (36.6 °C) (08/13/20 2257)  Pulse:  (!) 44 (08/13/20 2257)  Resp: 18 (08/13/20 2257)  BP: (!) 151/89 (08/13/20 2257)  SpO2: 96 % (08/13/20 2257) Vital Signs (24h Range):  Temp:  [97.5 °F (36.4 °C)-98.2 °F (36.8 °C)] 97.8 °F (36.6 °C)  Pulse:  [43-66] 44  Resp:  [16-20] 18  SpO2:  [96 %-100 %] 96 %  BP: (126-165)/() 151/89     Weight: 74.8 kg (165 lb)  Body mass index is 26.63 kg/m².    Physical Exam  Constitutional:       Appearance: Normal appearance. He is well-developed.   HENT:      Head: Normocephalic.   Eyes:      Conjunctiva/sclera: Conjunctivae normal.   Neck:      Musculoskeletal: Normal range of motion and neck supple.   Cardiovascular:      Rate and Rhythm: Regular rhythm. Bradycardia present.      Pulses:           Radial pulses are 2+ on the right side and 2+ on the left side.      Heart sounds: Normal heart sounds.   Pulmonary:      Effort: Pulmonary effort is normal.      Breath sounds: Normal breath sounds.   Abdominal:      General: Bowel sounds are normal. There is no distension.      Palpations: Abdomen is soft.      Tenderness: There is abdominal tenderness in the right upper quadrant.   Musculoskeletal: Normal range of motion.   Skin:     General: Skin is warm and dry.   Neurological:      Mental Status: He is alert and oriented to person, place, and time.      GCS: GCS eye subscore is 4. GCS verbal subscore is 5. GCS motor subscore is 6.      Motor: Motor function is intact.   Psychiatric:         Mood and Affect: Mood normal.         Speech: Speech normal.         Behavior: Behavior normal.             Significant Labs:   CBC:   Recent Labs   Lab 08/13/20  1443   WBC 9.89   HGB 16.2   HCT 48.9   *     CMP:   Recent Labs   Lab 08/13/20  1443      K 3.7      CO2 19*   GLU 91   BUN 10   CREATININE 0.8   CALCIUM 9.2   PROT 7.2   ALBUMIN 4.2   BILITOT 0.7   ALKPHOS 72   AST 18   ALT 18   ANIONGAP 15   EGFRNONAA >60       Significant Imaging: I have reviewed all pertinent imaging results/findings within the  past 24 hours.   no

## 2021-04-30 ENCOUNTER — OUTPATIENT (OUTPATIENT)
Dept: OUTPATIENT SERVICES | Facility: HOSPITAL | Age: 66
LOS: 1 days | Discharge: HOME | End: 2021-04-30
Payer: COMMERCIAL

## 2021-04-30 VITALS
HEART RATE: 54 BPM | RESPIRATION RATE: 15 BRPM | DIASTOLIC BLOOD PRESSURE: 65 MMHG | OXYGEN SATURATION: 100 % | SYSTOLIC BLOOD PRESSURE: 118 MMHG

## 2021-04-30 VITALS
RESPIRATION RATE: 15 BRPM | OXYGEN SATURATION: 100 % | SYSTOLIC BLOOD PRESSURE: 132 MMHG | TEMPERATURE: 98 F | HEART RATE: 52 BPM | DIASTOLIC BLOOD PRESSURE: 80 MMHG

## 2021-04-30 DIAGNOSIS — I10 ESSENTIAL (PRIMARY) HYPERTENSION: ICD-10-CM

## 2021-04-30 DIAGNOSIS — R55 SYNCOPE AND COLLAPSE: ICD-10-CM

## 2021-04-30 DIAGNOSIS — K21.9 GASTRO-ESOPHAGEAL REFLUX DISEASE WITHOUT ESOPHAGITIS: ICD-10-CM

## 2021-04-30 DIAGNOSIS — Z86.73 PERSONAL HISTORY OF TRANSIENT ISCHEMIC ATTACK (TIA), AND CEREBRAL INFARCTION WITHOUT RESIDUAL DEFICITS: ICD-10-CM

## 2021-04-30 DIAGNOSIS — E78.2 MIXED HYPERLIPIDEMIA: ICD-10-CM

## 2021-04-30 DIAGNOSIS — K44.9 DIAPHRAGMATIC HERNIA WITHOUT OBSTRUCTION OR GANGRENE: ICD-10-CM

## 2021-04-30 DIAGNOSIS — Z88.3 ALLERGY STATUS TO OTHER ANTI-INFECTIVE AGENTS: ICD-10-CM

## 2021-04-30 DIAGNOSIS — I74.9 EMBOLISM AND THROMBOSIS OF UNSPECIFIED ARTERY: ICD-10-CM

## 2021-04-30 DIAGNOSIS — K66.0 PERITONEAL ADHESIONS (POSTPROCEDURAL) (POSTINFECTION): Chronic | ICD-10-CM

## 2021-04-30 DIAGNOSIS — K56.69 OTHER INTESTINAL OBSTRUCTION: Chronic | ICD-10-CM

## 2021-04-30 DIAGNOSIS — F41.8 OTHER SPECIFIED ANXIETY DISORDERS: ICD-10-CM

## 2021-04-30 DIAGNOSIS — K35.2 ACUTE APPENDICITIS WITH GENERALIZED PERITONITIS: Chronic | ICD-10-CM

## 2021-04-30 PROCEDURE — 93312 ECHO TRANSESOPHAGEAL: CPT

## 2021-04-30 PROCEDURE — 93320 DOPPLER ECHO COMPLETE: CPT

## 2021-04-30 PROCEDURE — 93325 DOPPLER ECHO COLOR FLOW MAPG: CPT

## 2021-04-30 NOTE — PACU DISCHARGE NOTE - COMMENTS
Patient discharge home as per orders. pt A/Ox4. Vital signs stable. PIVL removed. No evidence of infiltration noted at discharge. Patient skin intact, Patient with no complain of pain, SOB, or chest pain at discharge. pt ambulating around unit tolerating well. All paperwork reviewed with pt Rx given with understanding, pt to follow up as directed patient verbalized understanding to all and without concerns at this time instructions given with good understanding

## 2021-04-30 NOTE — BEDSIDE PROCEDURE TIME OUT CHECKLIST - NSPOSTCOMMENTSFT_GEN_ALL_CORE
JADE started at 0800,  Probe inserted at 0802  Bubble study done at 0805 with 10cc NSS  Procedure ended at 0808

## 2021-05-06 ENCOUNTER — TRANSCRIPTION ENCOUNTER (OUTPATIENT)
Age: 66
End: 2021-05-06

## 2021-05-08 RX ORDER — ASPIRIN 81 MG
81 TABLET, DELAYED RELEASE (ENTERIC COATED) ORAL
Refills: 0 | Status: ACTIVE | COMMUNITY

## 2021-05-11 ENCOUNTER — APPOINTMENT (OUTPATIENT)
Dept: FAMILY MEDICINE | Facility: CLINIC | Age: 66
End: 2021-05-11
Payer: COMMERCIAL

## 2021-05-11 VITALS
HEIGHT: 68 IN | WEIGHT: 182 LBS | HEART RATE: 72 BPM | BODY MASS INDEX: 27.58 KG/M2 | OXYGEN SATURATION: 96 % | SYSTOLIC BLOOD PRESSURE: 130 MMHG | TEMPERATURE: 98.1 F | DIASTOLIC BLOOD PRESSURE: 78 MMHG

## 2021-05-11 DIAGNOSIS — Z63.5 DISRUPTION OF FAMILY BY SEPARATION AND DIVORCE: ICD-10-CM

## 2021-05-11 PROCEDURE — 99203 OFFICE O/P NEW LOW 30 MIN: CPT | Mod: 25

## 2021-05-11 PROCEDURE — 99072 ADDL SUPL MATRL&STAF TM PHE: CPT

## 2021-05-11 PROCEDURE — 36415 COLL VENOUS BLD VENIPUNCTURE: CPT

## 2021-05-11 SDOH — SOCIAL STABILITY - SOCIAL INSECURITY: DISRUPTION OF FAMILY BY SEPARATION AND DIVORCE: Z63.5

## 2021-05-11 NOTE — ASSESSMENT
[FreeTextEntry1] : He started Lipitor in April after his TIA. It has been about 3 weeks since he started this but he is fasting today and would like these labs checked.\par \par He is not sure if he should follow up with Neurology for his TIA. He is also debating getting another opinion from a different urologist about his elevated PSA. SInce it was last checked about 2 months ago he would like to repeat the PSA once more before deciding about this.

## 2021-05-11 NOTE — HISTORY OF PRESENT ILLNESS
[FreeTextEntry1] : ALYCIA PIERSON is a 65 year old male here for a follow up visit.\par  [de-identified] : Patient is here to follow up after hospitalization for a TIA. This occurred on April 18. He admits that he had been under a lot of stress. He began to feel like he was drunk and had difficulty speaking. His thoughts were clear but he was unable to put more than 2-3 words together. He went to [facility] ER where he had a CT, MRI, and neurologic evaluation.The CT showed nonspecific periventricular white matter hypodensities. There was no evidence of a CVA on imaging. He had a normal Echo in the hospital and a JADE on 4/30/21 as an outpatient. He believes in retrospect that this is his second TIA, the first occurring in Fall 2020. His cardiologist felt that his symptoms may have been due to dehydration.\par \par He also has an elevated PSA. This asif from 7.17 in 12/2020 to 9.4 in 3/2021. He has been seeing a Urologist at [facility] and now has an appointment with a doctor in [facility] as well. He had a prostate MRI which was reportedly negative. He states that his last urologist told him he could have a prostate biopsy or not; it was his choice.

## 2021-05-11 NOTE — REVIEW OF SYSTEMS
[Hesitancy] : hesitancy [Impotence] : impotence [Fever] : no fever [Chills] : no chills [Fatigue] : no fatigue [Vision Problems] : no vision problems [Hearing Loss] : no hearing loss [Chest Pain] : no chest pain [Palpitations] : no palpitations [Shortness Of Breath] : no shortness of breath [Cough] : no cough [Dyspnea on Exertion] : not dyspnea on exertion [Abdominal Pain] : no abdominal pain [Dysuria] : no dysuria [Hematuria] : no hematuria [Joint Pain] : no joint pain [Back Pain] : no back pain [Anxiety] : no anxiety [Depression] : no depression [de-identified] : see HPI

## 2021-05-11 NOTE — PHYSICAL EXAM
[No Carotid Bruits] : no carotid bruits [No Edema] : there was no peripheral edema [Soft] : abdomen soft [Non Tender] : non-tender [Grossly Normal Strength/Tone] : grossly normal strength/tone [No Focal Deficits] : no focal deficits [Normal] : affect was normal and insight and judgment were intact

## 2021-05-11 NOTE — PLAN
[FreeTextEntry1] : Will check cholesterol and PSA prior to his urology visit scheduled for Friday. He is debating what to do about returning to work as well. He has not worked since his TIA. He feels that work is very stressful and this may have been the cause of the TIA. He has the option to use FMLA time or go out on disability. Otherwise he would not be eliglble to retire for another 2 1/2  years.

## 2021-05-12 LAB
ALBUMIN SERPL ELPH-MCNC: 4.6 G/DL
ALP BLD-CCNC: 106 U/L
ALT SERPL-CCNC: 29 U/L
ANION GAP SERPL CALC-SCNC: 12 MMOL/L
AST SERPL-CCNC: 25 U/L
BILIRUB SERPL-MCNC: 0.7 MG/DL
BUN SERPL-MCNC: 12 MG/DL
CALCIUM SERPL-MCNC: 10.1 MG/DL
CHLORIDE SERPL-SCNC: 104 MMOL/L
CHOLEST SERPL-MCNC: 132 MG/DL
CO2 SERPL-SCNC: 26 MMOL/L
CREAT SERPL-MCNC: 0.99 MG/DL
GLUCOSE SERPL-MCNC: 93 MG/DL
HDLC SERPL-MCNC: 56 MG/DL
LDLC SERPL CALC-MCNC: 46 MG/DL
NONHDLC SERPL-MCNC: 77 MG/DL
POTASSIUM SERPL-SCNC: 4.4 MMOL/L
PROT SERPL-MCNC: 7.2 G/DL
PSA SERPL-MCNC: 7.77 NG/ML
SODIUM SERPL-SCNC: 142 MMOL/L
TRIGL SERPL-MCNC: 152 MG/DL

## 2021-05-17 ENCOUNTER — TRANSCRIPTION ENCOUNTER (OUTPATIENT)
Age: 66
End: 2021-05-17

## 2021-05-20 ENCOUNTER — APPOINTMENT (OUTPATIENT)
Dept: NEUROLOGY | Facility: CLINIC | Age: 66
End: 2021-05-20
Payer: COMMERCIAL

## 2021-05-20 VITALS
BODY MASS INDEX: 27.58 KG/M2 | HEIGHT: 68 IN | TEMPERATURE: 97.8 F | HEART RATE: 78 BPM | WEIGHT: 182 LBS | DIASTOLIC BLOOD PRESSURE: 82 MMHG | SYSTOLIC BLOOD PRESSURE: 128 MMHG

## 2021-05-20 DIAGNOSIS — R93.89 ABNORMAL FINDINGS ON DIAGNOSTIC IMAGING OF OTHER SPECIFIED BODY STRUCTURES: ICD-10-CM

## 2021-05-20 DIAGNOSIS — R47.89 OTHER SPEECH DISTURBANCES: ICD-10-CM

## 2021-05-20 PROCEDURE — 99215 OFFICE O/P EST HI 40 MIN: CPT

## 2021-05-20 NOTE — REASON FOR VISIT
[Post Hospitalization] : a post hospitalization visit [FreeTextEntry1] : For evaluation of TIA/word finding difficulties

## 2021-05-20 NOTE — HISTORY OF PRESENT ILLNESS
[FreeTextEntry1] : 65-year-old right-handed male with PMHx of HTN, HLD, anxiety/depression comes in today to discuss 2 episodes of word finding difficulty and CT scan brain results.\par \par Patient was admitted to [facility] on 21 after an episode of dizziness , difficulty getting words out, knew what he had to say but took longer time to do that, her symptoms had resolved by the time he came to ER. He was evaluated for acute stroke, CT angiogram head-neck were unremarkable for LVO, hemorrhage, stenosis or stroke, subsequently MRI brain showed no acute infract. Pt was started on BASA and Lipitor 40 mg, subsequently Lipitor dose reduced to 20 from 40 mg by PMD.\par \par Patient reports that he reviewed CT scan brain done in [facility], it has demonstrated prominence of ventricles and cortical sulci reflecting parenchymal loss, patient is concerned about this and wants to know if he will develop dementia.\par \par Upon obtaining further history, patient reports he has been under a lot of stress, his mother  recently, he is taking care of estate, is taking care of his father who is 92. Patient reports an episode few months ago, when he could not process inflammation fast, this occurred when he was at work, by the time he reached home he was back to normal, he does admit this could be anxiety related.

## 2021-05-20 NOTE — REVIEW OF SYSTEMS
[As Noted in HPI] : as noted in HPI [Negative] : Heme/Lymph [Sleep Disturbances] : sleep disturbances [Anxiety] : anxiety [Depression] : depression [de-identified] : facial pain [FreeTextEntry7] : nausea, difficulty swallowing,

## 2021-05-20 NOTE — PHYSICAL EXAM
[General Appearance - Alert] : alert [General Appearance - In No Acute Distress] : in no acute distress [Oriented To Time, Place, And Person] : oriented to person, place, and time [Impaired Insight] : insight and judgment were intact [Affect] : the affect was normal [Person] : oriented to person [Place] : oriented to place [Time] : oriented to time [Registration Intact] : recent registration memory intact [Concentration Intact] : normal concentrating ability [Naming Objects] : no difficulty naming common objects [Repeating Phrases] : no difficulty repeating a phrase [Fluency] : fluency intact [Comprehension] : comprehension intact [Past History] : adequate knowledge of personal past history [Cranial Nerves Optic (II)] : visual acuity intact bilaterally,  visual fields full to confrontation, pupils equal round and reactive to light [Cranial Nerves Oculomotor (III)] : extraocular motion intact [Cranial Nerves Trigeminal (V)] : facial sensation intact symmetrically [Cranial Nerves Facial (VII)] : face symmetrical [Cranial Nerves Vestibulocochlear (VIII)] : hearing was intact bilaterally [Cranial Nerves Glossopharyngeal (IX)] : tongue and palate midline [Cranial Nerves Accessory (XI - Cranial And Spinal)] : head turning and shoulder shrug symmetric [Cranial Nerves Hypoglossal (XII)] : there was no tongue deviation with protrusion [Motor Tone] : muscle tone was normal in all four extremities [Motor Strength] : muscle strength was normal in all four extremities [No Muscle Atrophy] : normal bulk in all four extremities [Sensation Tactile Decrease] : light touch was intact [Abnormal Walk] : normal gait [Balance] : balance was intact [2+] : Patella left 2+ [1+] : Ankle jerk right 1+ [0] : Ankle jerk left 0 [PERRL With Normal Accommodation] : pupils were equal in size, round, reactive to light, with normal accommodation [Extraocular Movements] : extraocular movements were intact [No APD] : no afferent pupillary defect [Full Visual Field] : full visual field [Outer Ear] : the ears and nose were normal in appearance [Hearing Threshold Finger Rub Not Hutchinson] : hearing was normal [Neck Cervical Mass (___cm)] : no neck mass was observed [Auscultation Breath Sounds / Voice Sounds] : lungs were clear to auscultation bilaterally [Heart Sounds] : normal S1 and S2 [Arterial Pulses Carotid] : carotid pulses were normal with no bruits [Edema] : there was no peripheral edema [Involuntary Movements] : no involuntary movements were seen [] : no rash [Past-pointing] : there was no past-pointing [Tremor] : no tremor present [Coordination - Dysmetria Impaired Finger-to-Nose Bilateral] : not present [Plantar Reflex Right Only] : normal on the right [Plantar Reflex Left Only] : normal on the left

## 2021-05-20 NOTE — DISCUSSION/SUMMARY
[FreeTextEntry1] : 65-year-old male with PMHx of HTN, HLD, SBO, anxiety/depression, was admitted to [facility] on 4/19/21 after an episode of dizziness, difficulty getting words out, knew what he had to say but took longer time to do that, symptoms resolved by the time he came to ER. CT angio head-neck - neg for LVO, hmg, stenosis or stroke,  MRI brain showed no acute infract.\par \par # Possible TIA\par \par - continue BASA and Lipitor 20 mg daily\par \par # CT scan brain demonstrated prominence of ventricles and cortical sulci reflecting parenchymal loss, this finding was not seen on MRI brain.\par \par - I reassured the patient about this finding, it is nonspecific and does not indicate onset of dementia. I explained to him that dementia is diagnosed clinically and not\par radiologically.\par \par # Prior episode of difficulty processing information quickly; possibly related to anxiety.\par \par - Pt advised follow up with me if epiosdes recur, we will perform cognitive testing\par - Labs: B12, fol, TSH, MMA

## 2021-05-24 ENCOUNTER — TRANSCRIPTION ENCOUNTER (OUTPATIENT)
Age: 66
End: 2021-05-24

## 2021-05-24 RX ORDER — ATORVASTATIN CALCIUM 20 MG/1
20 TABLET, FILM COATED ORAL
Refills: 0 | Status: DISCONTINUED | COMMUNITY
End: 2021-05-24

## 2021-05-29 ENCOUNTER — TRANSCRIPTION ENCOUNTER (OUTPATIENT)
Age: 66
End: 2021-05-29

## 2021-06-04 ENCOUNTER — TRANSCRIPTION ENCOUNTER (OUTPATIENT)
Age: 66
End: 2021-06-04

## 2021-06-09 ENCOUNTER — TRANSCRIPTION ENCOUNTER (OUTPATIENT)
Age: 66
End: 2021-06-09

## 2021-06-17 ENCOUNTER — APPOINTMENT (OUTPATIENT)
Dept: UROLOGY | Facility: CLINIC | Age: 66
End: 2021-06-17
Payer: COMMERCIAL

## 2021-06-17 VITALS
BODY MASS INDEX: 26.66 KG/M2 | TEMPERATURE: 97.6 F | SYSTOLIC BLOOD PRESSURE: 128 MMHG | RESPIRATION RATE: 17 BRPM | DIASTOLIC BLOOD PRESSURE: 82 MMHG | HEIGHT: 69 IN | HEART RATE: 86 BPM | WEIGHT: 180 LBS

## 2021-06-17 PROCEDURE — 99204 OFFICE O/P NEW MOD 45 MIN: CPT

## 2021-06-17 NOTE — HISTORY OF PRESENT ILLNESS
[Erectile Dysfunction] : Erectile Dysfunction [FreeTextEntry1] : History of elevated PSA . \par Trends:6/27/14   2.39\par              10/23/20  6.93\par               12/7/21   7.1\par                3 /12/21  9.4\par                5/11/21    7.7\par               \par MRI PRAD 2 77cc prostate 12/7/2020

## 2021-06-17 NOTE — PHYSICAL EXAM
[General Appearance - Well Developed] : well developed [General Appearance - Well Nourished] : well nourished [Heart Rate And Rhythm] : Heart rate and rhythm were normal [] : no respiratory distress [Abdomen Soft] : soft [Prostate Size ___ gm] : prostate size [unfilled] gm [Normal Station and Gait] : the gait and station were normal for the patient's age [Skin Color & Pigmentation] : normal skin color and pigmentation [No Focal Deficits] : no focal deficits [Oriented To Time, Place, And Person] : oriented to person, place, and time [No Palpable Adenopathy] : no palpable adenopathy

## 2021-06-17 NOTE — ASSESSMENT
[FreeTextEntry1] : We reviewed his PSA trends and his history . We discussed the options . His PSA except for the 9.1 outlyer is stable in the 7's He has a 77 cc prostate on MRI . He has no family history of Ca P . His dad is 92 .He does have some anxiety and cares for his dad .He recently had a TIA and is doing better .\par We discussed doing a biopsy but we can repeat the PSA in November and revisit the option based on results We discussed due to a enlarged prostate it will elevate the PSA  . He will repeat the PSA in Nov and see us again

## 2021-06-23 ENCOUNTER — NON-APPOINTMENT (OUTPATIENT)
Age: 66
End: 2021-06-23

## 2021-06-25 ENCOUNTER — APPOINTMENT (OUTPATIENT)
Dept: FAMILY MEDICINE | Facility: CLINIC | Age: 66
End: 2021-06-25
Payer: COMMERCIAL

## 2021-06-25 VITALS
OXYGEN SATURATION: 98 % | TEMPERATURE: 96.3 F | WEIGHT: 187 LBS | DIASTOLIC BLOOD PRESSURE: 70 MMHG | HEART RATE: 57 BPM | BODY MASS INDEX: 27.7 KG/M2 | HEIGHT: 69 IN | SYSTOLIC BLOOD PRESSURE: 150 MMHG

## 2021-06-25 VITALS — SYSTOLIC BLOOD PRESSURE: 125 MMHG | DIASTOLIC BLOOD PRESSURE: 75 MMHG

## 2021-06-25 PROCEDURE — 99213 OFFICE O/P EST LOW 20 MIN: CPT

## 2021-06-25 NOTE — PLAN
[FreeTextEntry1] : He may return to work. Letter written as requested.\par \par He is planning to return to see Dr. Hodge in 6 months with a PSA done prior.

## 2021-06-25 NOTE — HISTORY OF PRESENT ILLNESS
[FreeTextEntry1] : ALYCIA PIERSON is a 65 year old male here for a follow up visit.\par  [de-identified] : Patient is here for clearance to return to work. He was hospitalized for a TIA on April 18. He admits that he had been under a lot of stress. He began to feel like he was drunk and had difficulty speaking. His thoughts were clear but he was unable to put more than 2-3 words together. He went to [facility] ER where he had a CT, MRI, and neurologic evaluation.The CT showed nonspecific periventricular white matter hypodensities. There was no evidence of a CVA on imaging. He had a normal Echo in the hospital and a JADE on 4/30/21 as an outpatient. He believes in retrospect that this is his second TIA, the first occurring in Fall 2020. His cardiologist felt that his symptoms may have been due to dehydration.\par \par He also has an elevated PSA. This asif from 7.17 in 12/2020 to 9.4 in 3/2021. He has been seeing a Urologist at [facility] and now has an appointment with a doctor in [facility] as well. He had a prostate MRI which was reportedly negative. He states that his last urologist told him he could have a prostate biopsy or not; it was his choice. He recently saw Dr. Hodge who recommended a repeat PSA in 6 months but no biopsy at this time since his MRI was negative.\par \par He believes that the cause of his TIA was stress and is concerned that work is a cause of stress. He is planning to retire in December however and feels that he would be able to return to work for a few months without too much stress. He does have stress outside of work as well, taking care of his 94 year old father.

## 2021-06-25 NOTE — ASSESSMENT
[FreeTextEntry1] : His TIA symptoms have fully resolved. The TIA was thought to have been triggered by stress, possibly caused by work, but he feels he is ready to return to work.\par \par His PSA is elevated and there is the risk of prostate cancer but he is being managed by Dr. Hodge for this.

## 2021-06-25 NOTE — REVIEW OF SYSTEMS
[Hesitancy] : hesitancy [Impotence] : impotence [Fever] : no fever [Chills] : no chills [Fatigue] : no fatigue [Vision Problems] : no vision problems [Hearing Loss] : no hearing loss [Chest Pain] : no chest pain [Palpitations] : no palpitations [Shortness Of Breath] : no shortness of breath [Cough] : no cough [Dyspnea on Exertion] : not dyspnea on exertion [Abdominal Pain] : no abdominal pain [Dysuria] : no dysuria [Hematuria] : no hematuria [Joint Pain] : no joint pain [Back Pain] : no back pain [Anxiety] : no anxiety [Depression] : no depression [de-identified] : see HPI

## 2021-07-12 ENCOUNTER — TRANSCRIPTION ENCOUNTER (OUTPATIENT)
Age: 66
End: 2021-07-12

## 2021-07-16 ENCOUNTER — APPOINTMENT (OUTPATIENT)
Dept: NEUROLOGY | Facility: CLINIC | Age: 66
End: 2021-07-16

## 2021-07-21 ENCOUNTER — APPOINTMENT (OUTPATIENT)
Dept: FAMILY MEDICINE | Facility: CLINIC | Age: 66
End: 2021-07-21
Payer: COMMERCIAL

## 2021-07-21 ENCOUNTER — LABORATORY RESULT (OUTPATIENT)
Age: 66
End: 2021-07-21
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
## 2021-07-21 PROCEDURE — 36415 COLL VENOUS BLD VENIPUNCTURE: CPT

## 2021-07-22 ENCOUNTER — TRANSCRIPTION ENCOUNTER (OUTPATIENT)
Age: 66
End: 2021-07-22

## 2021-07-22 LAB — B BURGDOR IGG+IGM SER QL IB: NORMAL

## 2021-07-28 ENCOUNTER — NON-APPOINTMENT (OUTPATIENT)
Age: 66
End: 2021-07-28

## 2021-09-02 ENCOUNTER — APPOINTMENT (OUTPATIENT)
Dept: NEUROLOGY | Facility: CLINIC | Age: 66
End: 2021-09-02
Payer: COMMERCIAL

## 2021-09-02 VITALS
WEIGHT: 183 LBS | TEMPERATURE: 97.8 F | BODY MASS INDEX: 35.93 KG/M2 | HEART RATE: 72 BPM | SYSTOLIC BLOOD PRESSURE: 128 MMHG | HEIGHT: 60 IN | DIASTOLIC BLOOD PRESSURE: 74 MMHG

## 2021-09-02 PROCEDURE — 99214 OFFICE O/P EST MOD 30 MIN: CPT

## 2021-09-02 NOTE — PHYSICAL EXAM
[General Appearance - Alert] : alert [General Appearance - In No Acute Distress] : in no acute distress [Oriented To Time, Place, And Person] : oriented to person, place, and time [Impaired Insight] : insight and judgment were intact [Affect] : the affect was normal [Person] : oriented to person [Place] : oriented to place [Time] : oriented to time [Registration Intact] : recent registration memory intact [Concentration Intact] : normal concentrating ability [Naming Objects] : no difficulty naming common objects [Repeating Phrases] : no difficulty repeating a phrase [Fluency] : fluency intact [Comprehension] : comprehension intact [Past History] : adequate knowledge of personal past history [Cranial Nerves Optic (II)] : visual acuity intact bilaterally,  visual fields full to confrontation, pupils equal round and reactive to light [Cranial Nerves Oculomotor (III)] : extraocular motion intact [Cranial Nerves Trigeminal (V)] : facial sensation intact symmetrically [Cranial Nerves Facial (VII)] : face symmetrical [Cranial Nerves Vestibulocochlear (VIII)] : hearing was intact bilaterally [Cranial Nerves Glossopharyngeal (IX)] : tongue and palate midline [Cranial Nerves Accessory (XI - Cranial And Spinal)] : head turning and shoulder shrug symmetric [Cranial Nerves Hypoglossal (XII)] : there was no tongue deviation with protrusion [Motor Tone] : muscle tone was normal in all four extremities [Motor Strength] : muscle strength was normal in all four extremities [No Muscle Atrophy] : normal bulk in all four extremities [Sensation Tactile Decrease] : light touch was intact [Balance] : balance was intact [Abnormal Walk] : normal gait [2+] : Patella left 2+ [1+] : Ankle jerk right 1+ [0] : Ankle jerk left 0 [Extraocular Movements] : extraocular movements were intact [PERRL With Normal Accommodation] : pupils were equal in size, round, reactive to light, with normal accommodation [No APD] : no afferent pupillary defect [Full Visual Field] : full visual field [Outer Ear] : the ears and nose were normal in appearance [Hearing Threshold Finger Rub Not [place]] : hearing was normal [] : the neck was supple [Neck Cervical Mass (___cm)] : no neck mass was observed [Past-pointing] : there was no past-pointing [Tremor] : no tremor present [Coordination - Dysmetria Impaired Finger-to-Nose Bilateral] : not present [Plantar Reflex Right Only] : normal on the right [Plantar Reflex Left Only] : normal on the left

## 2021-09-02 NOTE — REVIEW OF SYSTEMS
[Sleep Disturbances] : sleep disturbances [Anxiety] : anxiety [Depression] : depression [Negative] : Heme/Lymph [As Noted in HPI] : as noted in HPI [Numbness] : numbness [Tingling] : tingling [de-identified] : facial pain [FreeTextEntry7] : nausea, difficulty swallowing,

## 2021-09-02 NOTE — DATA REVIEWED
[de-identified] : 4/19/21: MRI head: No acute infarct, hemorrhage or mass effect [de-identified] : 4/18/21: CT angio head and neck: no LVO, severe stenosis or vascular malformation\par CT perfusion: No ischemic penumbra

## 2021-09-02 NOTE — HISTORY OF PRESENT ILLNESS
[FreeTextEntry1] : Pt is here for follow up visit today, last seen on 5/20/21. 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 \par \par Patient also reports that since last one week he has been experiencing numbness in the left lower jaw, it is intermittent, not associated with any fascial droop, speech disturbance, ear pain, visual blurring or sensory motor symptoms. He had contacted his PMD's office regarding this condition, was told to go to ER, patient felt it was not serious enough to warrant ER visit.\par \par Patient reports he has remained stable since his last visit, he has not had any more episodes of word finding difficulty, he had Lyme titers checked recently, was negative.

## 2021-09-02 NOTE — DISCUSSION/SUMMARY
[FreeTextEntry1] : 65-year-old M with PMHx of HTN, HLD, SBO, anxiety/depression, was admitted to [facility] on 4/19/21 after an episode of dizziness, difficulty getting words out, knew what he had to say but took longer time, symptoms resolved by the time he came to ER. CT angio head-neck - neg for LVO, hmg, stenosis or stroke,  MRI brain showed no acute infract.\par \par # Left jaw and neck  dysesthesias; could be upper cervical radiculopathy or facial neuralgia, ? Trig neuralgia\par \par - I have recommended starting PT for the neck\par - Gabapentin 100 mg h.s., can increase to 100 mg twice daily\par - Symptoms persist or worsen, we may get MRI C-spine\par - Follow up with me in one month\par \par # Possible TIA\par \par - continue BASA and Lipitor 20 mg daily\par \par # CT scan brain demonstrated prominence of ventricles and cortical sulci reflecting parenchymal loss, this finding was not seen on MRI brain.\par \par - I reassured the patient about this finding, it is nonspecific and does not indicate onset of dementia. I explained to him that dementia is diagnosed clinically and not\par radiologically.\par \par # Prior episode of difficulty processing information quickly; possibly related to anxiety, resolved fully.\par \par - Pt advised cognitive testing and labs: B12, fol, TSH, MMA; he wants to defer it as his symptoms have resolved

## 2021-09-17 ENCOUNTER — RX RENEWAL (OUTPATIENT)
Age: 66
End: 2021-09-17

## 2021-09-28 ENCOUNTER — NON-APPOINTMENT (OUTPATIENT)
Age: 66
End: 2021-09-28

## 2021-10-04 ENCOUNTER — APPOINTMENT (OUTPATIENT)
Dept: FAMILY MEDICINE | Facility: CLINIC | Age: 66
End: 2021-10-04
Payer: COMMERCIAL

## 2021-10-04 VITALS
TEMPERATURE: 97.6 F | HEIGHT: 69 IN | OXYGEN SATURATION: 98 % | HEART RATE: 57 BPM | SYSTOLIC BLOOD PRESSURE: 128 MMHG | DIASTOLIC BLOOD PRESSURE: 76 MMHG

## 2021-10-04 DIAGNOSIS — J06.9 ACUTE UPPER RESPIRATORY INFECTION, UNSPECIFIED: ICD-10-CM

## 2021-10-04 PROCEDURE — 99212 OFFICE O/P EST SF 10 MIN: CPT | Mod: 25

## 2021-10-04 NOTE — PHYSICAL EXAM
[No Acute Distress] : no acute distress [Well Nourished] : well nourished [Well Developed] : well developed [Well-Appearing] : well-appearing [Normal Outer Ear/Nose] : the outer ears and nose were normal in appearance [Normal Oropharynx] : the oropharynx was normal [No JVD] : no jugular venous distention [No Lymphadenopathy] : no lymphadenopathy [Supple] : supple [Thyroid Normal, No Nodules] : the thyroid was normal and there were no nodules present [No Respiratory Distress] : no respiratory distress  [No Accessory Muscle Use] : no accessory muscle use [Clear to Auscultation] : lungs were clear to auscultation bilaterally [Normal Rate] : normal rate  [Regular Rhythm] : with a regular rhythm [Normal S1, S2] : normal S1 and S2 [No Murmur] : no murmur heard [Normal Posterior Cervical Nodes] : no posterior cervical lymphadenopathy [Normal Anterior Cervical Nodes] : no anterior cervical lymphadenopathy [No CVA Tenderness] : no CVA  tenderness [No Spinal Tenderness] : no spinal tenderness [No Joint Swelling] : no joint swelling [Grossly Normal Strength/Tone] : grossly normal strength/tone [Coordination Grossly Intact] : coordination grossly intact [No Focal Deficits] : no focal deficits [Normal Gait] : normal gait [Deep Tendon Reflexes (DTR)] : deep tendon reflexes were 2+ and symmetric [Normal Affect] : the affect was normal [Normal Insight/Judgement] : insight and judgment were intact [de-identified] : Edematous nasal turbinates

## 2021-10-04 NOTE — REVIEW OF SYSTEMS
[Negative] : Heme/Lymph [Fever] : fever [Chills] : chills [Fatigue] : fatigue [Nasal Discharge] : nasal discharge [Cough] : cough

## 2021-10-04 NOTE — HISTORY OF PRESENT ILLNESS
[FreeTextEntry8] : Patient is here complaining of cough and fever with increased congestion x several days. OTC medications have not improved symptoms. . 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 He is vaccinated for Covid

## 2021-10-04 NOTE — PLAN
[FreeTextEntry1] : Patient advised to continue with conservative treatment , as well as medications prescribed and recommended at today's OV . Advised nasal saline, flonase and antihistamine \par Covid Swab performed \par Followup if symptoms do not improve or resolve.\par

## 2021-10-07 ENCOUNTER — NON-APPOINTMENT (OUTPATIENT)
Age: 66
End: 2021-10-07

## 2021-10-07 LAB — SARS-COV-2 N GENE NPH QL NAA+PROBE: NOT DETECTED

## 2021-10-25 ENCOUNTER — APPOINTMENT (OUTPATIENT)
Dept: NEUROLOGY | Facility: CLINIC | Age: 66
End: 2021-10-25
Payer: COMMERCIAL

## 2021-10-25 VITALS
SYSTOLIC BLOOD PRESSURE: 124 MMHG | DIASTOLIC BLOOD PRESSURE: 70 MMHG | HEIGHT: 69 IN | TEMPERATURE: 97.5 F | WEIGHT: 193 LBS | BODY MASS INDEX: 28.58 KG/M2 | HEART RATE: 62 BPM

## 2021-10-25 DIAGNOSIS — G51.8 OTHER DISORDERS OF FACIAL NERVE: ICD-10-CM

## 2021-10-25 DIAGNOSIS — M54.12 RADICULOPATHY, CERVICAL REGION: ICD-10-CM

## 2021-10-25 PROCEDURE — 99214 OFFICE O/P EST MOD 30 MIN: CPT

## 2021-10-25 NOTE — DISCUSSION/SUMMARY
[FreeTextEntry1] : 65-year-old M with PMHx of HTN, HLD, SBO, anxiety/depression, was admitted to [facility] on 4/19/21 after an episode of dizziness, difficulty getting words out, knew what he had to say but took longer time, symptoms resolved by the time he came to ER. CT angio head-neck - neg for LVO, hmg, stenosis or stroke,  MRI brain showed no acute infract.\par \par # Left jaw and neck  dysesthesias; could be upper cervical radiculopathy or facial neuralgia, ? Trig neuralgia, improved with PT\par \par - I have recommended PT for the neck  as needed\par - Can taper Gabapentin 100 mg qod x 1 month, then twice weekly x 1 week then stop a\par \par # ? TIA\par \par - BASA and Lipitor 20 mg daily\par \par # CT scan brain demonstrated prominence of ventricles and cortical sulci reflecting parenchymal loss, this finding was not seen on MRI brain.\par \par - I reassured the patient about this finding, it is nonspecific and does not indicate onset of dementia. I explained to him that dementia is diagnosed clinically and not\par radiologically.\par \par # Prior episode of difficulty processing information quickly; possibly related to anxiety, resolved fully.\par \par - Pt advised cognitive testing and labs: B12, fol, TSH, MMA; he wants to defer it as his symptoms have resolved

## 2021-10-25 NOTE — HISTORY OF PRESENT ILLNESS
[FreeTextEntry1] : Pt is here for follow up visit today, last seen on 9/2/20/21. Patient reports that he has been attending PT , the pain in left side neck left supraclavicular region has improves, no associated tinnitus, vertigo, headache, or weakness of the left arm.\par \par Patient also reports that  numbness in the left lower jaw, is minimal, He is taking gabapentin 100 mg daily, he reports no discomfort while chewing eating or with oral motor movement.\par \par Patient reports he has remained stable since his last visit, he has not had any more episodes of word finding difficulty.

## 2021-10-25 NOTE — PHYSICAL EXAM
[General Appearance - Alert] : alert [General Appearance - In No Acute Distress] : in no acute distress [Oriented To Time, Place, And Person] : oriented to person, place, and time [Impaired Insight] : insight and judgment were intact [Affect] : the affect was normal [Person] : oriented to person [Place] : oriented to place [Time] : oriented to time [Registration Intact] : recent registration memory intact [Concentration Intact] : normal concentrating ability [Naming Objects] : no difficulty naming common objects [Repeating Phrases] : no difficulty repeating a phrase [Fluency] : fluency intact [Comprehension] : comprehension intact [Past History] : adequate knowledge of personal past history [Cranial Nerves Optic (II)] : visual acuity intact bilaterally,  visual fields full to confrontation, pupils equal round and reactive to light [Cranial Nerves Oculomotor (III)] : extraocular motion intact [Cranial Nerves Trigeminal (V)] : facial sensation intact symmetrically [Cranial Nerves Facial (VII)] : face symmetrical [Cranial Nerves Vestibulocochlear (VIII)] : hearing was intact bilaterally [Cranial Nerves Glossopharyngeal (IX)] : tongue and palate midline [Cranial Nerves Accessory (XI - Cranial And Spinal)] : head turning and shoulder shrug symmetric [Cranial Nerves Hypoglossal (XII)] : there was no tongue deviation with protrusion [Motor Tone] : muscle tone was normal in all four extremities [Motor Strength] : muscle strength was normal in all four extremities [No Muscle Atrophy] : normal bulk in all four extremities [Sensation Tactile Decrease] : light touch was intact [Abnormal Walk] : normal gait [Balance] : balance was intact [2+] : Patella left 2+ [1+] : Ankle jerk right 1+ [0] : Ankle jerk left 0 [PERRL With Normal Accommodation] : pupils were equal in size, round, reactive to light, with normal accommodation [Extraocular Movements] : extraocular movements were intact [No APD] : no afferent pupillary defect [Full Visual Field] : full visual field [Outer Ear] : the ears and nose were normal in appearance [Hearing Threshold Finger Rub Not [place]] : hearing was normal [] : the neck was supple [Neck Cervical Mass (___cm)] : no neck mass was observed [Past-pointing] : there was no past-pointing [Tremor] : no tremor present [Coordination - Dysmetria Impaired Finger-to-Nose Bilateral] : not present [Plantar Reflex Right Only] : normal on the right [Plantar Reflex Left Only] : normal on the left

## 2021-10-25 NOTE — REVIEW OF SYSTEMS
[Sleep Disturbances] : sleep disturbances [Anxiety] : anxiety [Depression] : depression [As Noted in HPI] : as noted in HPI [Numbness] : numbness [Tingling] : no tingling [Negative] : Gastrointestinal [de-identified] : facial pain

## 2021-10-25 NOTE — DATA REVIEWED
[de-identified] : 4/19/21: MRI head: No acute infarct, hemorrhage or mass effect [de-identified] : 4/18/21: CT angio head and neck: no LVO, severe stenosis or vascular malformation\par CT perfusion: No ischemic penumbra

## 2021-11-16 ENCOUNTER — APPOINTMENT (OUTPATIENT)
Dept: UROLOGY | Facility: CLINIC | Age: 66
End: 2021-11-16
Payer: COMMERCIAL

## 2021-11-16 PROCEDURE — 99214 OFFICE O/P EST MOD 30 MIN: CPT

## 2022-02-18 ENCOUNTER — RESULT REVIEW (OUTPATIENT)
Age: 67
End: 2022-02-18

## 2022-02-18 ENCOUNTER — OUTPATIENT (OUTPATIENT)
Dept: OUTPATIENT SERVICES | Facility: HOSPITAL | Age: 67
LOS: 1 days | End: 2022-02-18
Payer: MEDICARE

## 2022-02-18 ENCOUNTER — APPOINTMENT (OUTPATIENT)
Dept: MRI IMAGING | Facility: CLINIC | Age: 67
End: 2022-02-18
Payer: MEDICARE

## 2022-02-18 DIAGNOSIS — K66.0 PERITONEAL ADHESIONS (POSTPROCEDURAL) (POSTINFECTION): Chronic | ICD-10-CM

## 2022-02-18 DIAGNOSIS — I71.9 AORTIC ANEURYSM OF UNSPECIFIED SITE, WITHOUT RUPTURE: ICD-10-CM

## 2022-02-18 DIAGNOSIS — K56.69 OTHER INTESTINAL OBSTRUCTION: Chronic | ICD-10-CM

## 2022-02-18 DIAGNOSIS — K35.2 ACUTE APPENDICITIS WITH GENERALIZED PERITONITIS: Chronic | ICD-10-CM

## 2022-02-18 PROCEDURE — A9585: CPT

## 2022-02-18 PROCEDURE — C8911: CPT | Mod: MH

## 2022-02-18 PROCEDURE — 71555 MRI ANGIO CHEST W OR W/O DYE: CPT | Mod: 26,MH

## 2022-04-12 RX ORDER — CEPHALEXIN 500 MG
1 CAPSULE ORAL
Qty: 0 | Refills: 0 | DISCHARGE
Start: 2022-04-12 | End: 2022-04-19

## 2022-05-25 ENCOUNTER — FORM ENCOUNTER (OUTPATIENT)
Age: 67
End: 2022-05-25

## 2022-05-26 ENCOUNTER — TRANSCRIPTION ENCOUNTER (OUTPATIENT)
Age: 67
End: 2022-05-26

## 2022-05-26 ENCOUNTER — EMERGENCY (EMERGENCY)
Facility: HOSPITAL | Age: 67
LOS: 0 days | Discharge: HOME | End: 2022-05-26
Attending: EMERGENCY MEDICINE | Admitting: INTERNAL MEDICINE
Payer: MEDICARE

## 2022-05-26 ENCOUNTER — FORM ENCOUNTER (OUTPATIENT)
Age: 67
End: 2022-05-26

## 2022-05-26 VITALS — SYSTOLIC BLOOD PRESSURE: 149 MMHG | HEART RATE: 64 BPM | DIASTOLIC BLOOD PRESSURE: 85 MMHG

## 2022-05-26 VITALS — HEIGHT: 68 IN | WEIGHT: 180.78 LBS

## 2022-05-26 DIAGNOSIS — K56.69 OTHER INTESTINAL OBSTRUCTION: Chronic | ICD-10-CM

## 2022-05-26 DIAGNOSIS — Z20.822 CONTACT WITH AND (SUSPECTED) EXPOSURE TO COVID-19: ICD-10-CM

## 2022-05-26 DIAGNOSIS — I10 ESSENTIAL (PRIMARY) HYPERTENSION: ICD-10-CM

## 2022-05-26 DIAGNOSIS — R07.89 OTHER CHEST PAIN: ICD-10-CM

## 2022-05-26 DIAGNOSIS — Z98.890 OTHER SPECIFIED POSTPROCEDURAL STATES: Chronic | ICD-10-CM

## 2022-05-26 DIAGNOSIS — F32.A DEPRESSION, UNSPECIFIED: ICD-10-CM

## 2022-05-26 DIAGNOSIS — Z90.49 ACQUIRED ABSENCE OF OTHER SPECIFIED PARTS OF DIGESTIVE TRACT: Chronic | ICD-10-CM

## 2022-05-26 DIAGNOSIS — R10.9 UNSPECIFIED ABDOMINAL PAIN: ICD-10-CM

## 2022-05-26 DIAGNOSIS — Z86.73 PERSONAL HISTORY OF TRANSIENT ISCHEMIC ATTACK (TIA), AND CEREBRAL INFARCTION WITHOUT RESIDUAL DEFICITS: ICD-10-CM

## 2022-05-26 DIAGNOSIS — R11.0 NAUSEA: ICD-10-CM

## 2022-05-26 DIAGNOSIS — K35.2 ACUTE APPENDICITIS WITH GENERALIZED PERITONITIS: Chronic | ICD-10-CM

## 2022-05-26 DIAGNOSIS — E78.5 HYPERLIPIDEMIA, UNSPECIFIED: ICD-10-CM

## 2022-05-26 DIAGNOSIS — K66.0 PERITONEAL ADHESIONS (POSTPROCEDURAL) (POSTINFECTION): Chronic | ICD-10-CM

## 2022-05-26 DIAGNOSIS — F41.9 ANXIETY DISORDER, UNSPECIFIED: ICD-10-CM

## 2022-05-26 DIAGNOSIS — Z90.89 ACQUIRED ABSENCE OF OTHER ORGANS: Chronic | ICD-10-CM

## 2022-05-26 DIAGNOSIS — R07.9 CHEST PAIN, UNSPECIFIED: ICD-10-CM

## 2022-05-26 DIAGNOSIS — I71.2 THORACIC AORTIC ANEURYSM, WITHOUT RUPTURE: ICD-10-CM

## 2022-05-26 DIAGNOSIS — Z87.19 PERSONAL HISTORY OF OTHER DISEASES OF THE DIGESTIVE SYSTEM: ICD-10-CM

## 2022-05-26 DIAGNOSIS — I49.3 VENTRICULAR PREMATURE DEPOLARIZATION: ICD-10-CM

## 2022-05-26 LAB
ADD ON TEST-SPECIMEN IN LAB: SIGNIFICANT CHANGE UP
ALBUMIN SERPL ELPH-MCNC: 3.7 G/DL — SIGNIFICANT CHANGE UP (ref 3.3–5)
ALP SERPL-CCNC: 83 U/L — SIGNIFICANT CHANGE UP (ref 40–120)
ALT FLD-CCNC: 33 U/L — SIGNIFICANT CHANGE UP (ref 12–78)
ANION GAP SERPL CALC-SCNC: 6 MMOL/L — SIGNIFICANT CHANGE UP (ref 5–17)
AST SERPL-CCNC: 25 U/L — SIGNIFICANT CHANGE UP (ref 15–37)
BASOPHILS # BLD AUTO: 0.03 K/UL — SIGNIFICANT CHANGE UP (ref 0–0.2)
BASOPHILS NFR BLD AUTO: 0.5 % — SIGNIFICANT CHANGE UP (ref 0–2)
BILIRUB SERPL-MCNC: 0.3 MG/DL — SIGNIFICANT CHANGE UP (ref 0.2–1.2)
BUN SERPL-MCNC: 12 MG/DL — SIGNIFICANT CHANGE UP (ref 7–23)
CALCIUM SERPL-MCNC: 8.9 MG/DL — SIGNIFICANT CHANGE UP (ref 8.5–10.1)
CHLORIDE SERPL-SCNC: 110 MMOL/L — HIGH (ref 96–108)
CO2 SERPL-SCNC: 26 MMOL/L — SIGNIFICANT CHANGE UP (ref 22–31)
CREAT SERPL-MCNC: 0.87 MG/DL — SIGNIFICANT CHANGE UP (ref 0.5–1.3)
EGFR: 96 ML/MIN/1.73M2 — SIGNIFICANT CHANGE UP
EOSINOPHIL # BLD AUTO: 0.23 K/UL — SIGNIFICANT CHANGE UP (ref 0–0.5)
EOSINOPHIL NFR BLD AUTO: 3.9 % — SIGNIFICANT CHANGE UP (ref 0–6)
GLUCOSE SERPL-MCNC: 104 MG/DL — HIGH (ref 70–99)
HCT VFR BLD CALC: 46.7 % — SIGNIFICANT CHANGE UP (ref 39–50)
HGB BLD-MCNC: 15.8 G/DL — SIGNIFICANT CHANGE UP (ref 13–17)
IMM GRANULOCYTES NFR BLD AUTO: 0.3 % — SIGNIFICANT CHANGE UP (ref 0–1.5)
LYMPHOCYTES # BLD AUTO: 2.33 K/UL — SIGNIFICANT CHANGE UP (ref 1–3.3)
LYMPHOCYTES # BLD AUTO: 39.2 % — SIGNIFICANT CHANGE UP (ref 13–44)
MAGNESIUM SERPL-MCNC: 2.4 MG/DL — SIGNIFICANT CHANGE UP (ref 1.6–2.6)
MCHC RBC-ENTMCNC: 30.3 PG — SIGNIFICANT CHANGE UP (ref 27–34)
MCHC RBC-ENTMCNC: 33.8 GM/DL — SIGNIFICANT CHANGE UP (ref 32–36)
MCV RBC AUTO: 89.5 FL — SIGNIFICANT CHANGE UP (ref 80–100)
MONOCYTES # BLD AUTO: 0.75 K/UL — SIGNIFICANT CHANGE UP (ref 0–0.9)
MONOCYTES NFR BLD AUTO: 12.6 % — SIGNIFICANT CHANGE UP (ref 2–14)
NEUTROPHILS # BLD AUTO: 2.59 K/UL — SIGNIFICANT CHANGE UP (ref 1.8–7.4)
NEUTROPHILS NFR BLD AUTO: 43.5 % — SIGNIFICANT CHANGE UP (ref 43–77)
PHOSPHATE SERPL-MCNC: 2.9 MG/DL — SIGNIFICANT CHANGE UP (ref 2.5–4.5)
PLATELET # BLD AUTO: 270 K/UL — SIGNIFICANT CHANGE UP (ref 150–400)
POTASSIUM SERPL-MCNC: 3.8 MMOL/L — SIGNIFICANT CHANGE UP (ref 3.5–5.3)
POTASSIUM SERPL-SCNC: 3.8 MMOL/L — SIGNIFICANT CHANGE UP (ref 3.5–5.3)
PROT SERPL-MCNC: 7.5 GM/DL — SIGNIFICANT CHANGE UP (ref 6–8.3)
RBC # BLD: 5.22 M/UL — SIGNIFICANT CHANGE UP (ref 4.2–5.8)
RBC # FLD: 13 % — SIGNIFICANT CHANGE UP (ref 10.3–14.5)
SODIUM SERPL-SCNC: 142 MMOL/L — SIGNIFICANT CHANGE UP (ref 135–145)
TROPONIN I, HIGH SENSITIVITY RESULT: 6.05 NG/L — SIGNIFICANT CHANGE UP
TROPONIN I, HIGH SENSITIVITY RESULT: 6.77 NG/L — SIGNIFICANT CHANGE UP
TROPONIN I, HIGH SENSITIVITY RESULT: 7.37 NG/L — SIGNIFICANT CHANGE UP
TSH SERPL-MCNC: 1.2 UU/ML — SIGNIFICANT CHANGE UP (ref 0.34–4.82)
WBC # BLD: 5.95 K/UL — SIGNIFICANT CHANGE UP (ref 3.8–10.5)
WBC # FLD AUTO: 5.95 K/UL — SIGNIFICANT CHANGE UP (ref 3.8–10.5)

## 2022-05-26 PROCEDURE — A9500: CPT

## 2022-05-26 PROCEDURE — 85025 COMPLETE CBC W/AUTO DIFF WBC: CPT

## 2022-05-26 PROCEDURE — 87635 SARS-COV-2 COVID-19 AMP PRB: CPT

## 2022-05-26 PROCEDURE — 93017 CV STRESS TEST TRACING ONLY: CPT

## 2022-05-26 PROCEDURE — 80053 COMPREHEN METABOLIC PANEL: CPT

## 2022-05-26 PROCEDURE — 93010 ELECTROCARDIOGRAM REPORT: CPT

## 2022-05-26 PROCEDURE — 99236 HOSP IP/OBS SAME DATE HI 85: CPT

## 2022-05-26 PROCEDURE — 84100 ASSAY OF PHOSPHORUS: CPT

## 2022-05-26 PROCEDURE — G1004: CPT

## 2022-05-26 PROCEDURE — 99285 EMERGENCY DEPT VISIT HI MDM: CPT | Mod: CS

## 2022-05-26 PROCEDURE — 83735 ASSAY OF MAGNESIUM: CPT

## 2022-05-26 PROCEDURE — 84484 ASSAY OF TROPONIN QUANT: CPT

## 2022-05-26 PROCEDURE — 93005 ELECTROCARDIOGRAM TRACING: CPT | Mod: XU

## 2022-05-26 PROCEDURE — 78452 HT MUSCLE IMAGE SPECT MULT: CPT | Mod: MF

## 2022-05-26 PROCEDURE — 99497 ADVNCD CARE PLAN 30 MIN: CPT | Mod: 25

## 2022-05-26 PROCEDURE — 78452 HT MUSCLE IMAGE SPECT MULT: CPT | Mod: 26,MF

## 2022-05-26 PROCEDURE — G0378: CPT

## 2022-05-26 PROCEDURE — 93016 CV STRESS TEST SUPVJ ONLY: CPT

## 2022-05-26 PROCEDURE — 71045 X-RAY EXAM CHEST 1 VIEW: CPT | Mod: 26

## 2022-05-26 PROCEDURE — 84443 ASSAY THYROID STIM HORMONE: CPT

## 2022-05-26 PROCEDURE — 99285 EMERGENCY DEPT VISIT HI MDM: CPT | Mod: 25

## 2022-05-26 PROCEDURE — 93018 CV STRESS TEST I&R ONLY: CPT

## 2022-05-26 PROCEDURE — 71045 X-RAY EXAM CHEST 1 VIEW: CPT

## 2022-05-26 PROCEDURE — 36415 COLL VENOUS BLD VENIPUNCTURE: CPT

## 2022-05-26 RX ORDER — AMLODIPINE BESYLATE 2.5 MG/1
5 TABLET ORAL DAILY
Refills: 0 | Status: DISCONTINUED | OUTPATIENT
Start: 2022-05-26 | End: 2022-05-26

## 2022-05-26 RX ORDER — ASPIRIN/CALCIUM CARB/MAGNESIUM 324 MG
81 TABLET ORAL DAILY
Refills: 0 | Status: DISCONTINUED | OUTPATIENT
Start: 2022-05-26 | End: 2022-05-26

## 2022-05-26 RX ORDER — DILTIAZEM HCL 120 MG
120 CAPSULE, EXT RELEASE 24 HR ORAL DAILY
Refills: 0 | Status: DISCONTINUED | OUTPATIENT
Start: 2022-05-26 | End: 2022-05-26

## 2022-05-26 RX ORDER — ATORVASTATIN CALCIUM 80 MG/1
20 TABLET, FILM COATED ORAL AT BEDTIME
Refills: 0 | Status: DISCONTINUED | OUTPATIENT
Start: 2022-05-26 | End: 2022-05-26

## 2022-05-26 RX ORDER — ONDANSETRON 8 MG/1
4 TABLET, FILM COATED ORAL EVERY 6 HOURS
Refills: 0 | Status: DISCONTINUED | OUTPATIENT
Start: 2022-05-26 | End: 2022-05-26

## 2022-05-26 RX ORDER — ASPIRIN/CALCIUM CARB/MAGNESIUM 324 MG
162 TABLET ORAL ONCE
Refills: 0 | Status: COMPLETED | OUTPATIENT
Start: 2022-05-26 | End: 2022-05-26

## 2022-05-26 RX ORDER — DOXAZOSIN MESYLATE 4 MG
1 TABLET ORAL DAILY
Refills: 0 | Status: DISCONTINUED | OUTPATIENT
Start: 2022-05-26 | End: 2022-05-26

## 2022-05-26 RX ORDER — CALCIUM CARBONATE 500(1250)
3 TABLET ORAL EVERY 6 HOURS
Refills: 0 | Status: DISCONTINUED | OUTPATIENT
Start: 2022-05-26 | End: 2022-05-26

## 2022-05-26 RX ORDER — DOXAZOSIN MESYLATE 4 MG
2 TABLET ORAL AT BEDTIME
Refills: 0 | Status: DISCONTINUED | OUTPATIENT
Start: 2022-05-26 | End: 2022-05-26

## 2022-05-26 RX ORDER — DILTIAZEM HCL 120 MG
1 CAPSULE, EXT RELEASE 24 HR ORAL
Qty: 30 | Refills: 0
Start: 2022-05-26 | End: 2022-06-24

## 2022-05-26 RX ADMIN — Medication 162 MILLIGRAM(S): at 03:13

## 2022-05-26 RX ADMIN — Medication 1 MILLIGRAM(S): at 14:36

## 2022-05-26 RX ADMIN — Medication 81 MILLIGRAM(S): at 14:33

## 2022-05-26 RX ADMIN — Medication 120 MILLIGRAM(S): at 14:34

## 2022-05-26 NOTE — ED ADULT NURSE REASSESSMENT NOTE - NS ED NURSE REASSESS COMMENT FT1
Pt bradycardic @ 47 at times, assessed bedside,  MD Madrigal made aware, second troponin pending @6am.

## 2022-05-26 NOTE — DISCHARGE NOTE PROVIDER - NSDCCPCAREPLAN_GEN_ALL_CORE_FT
PRINCIPAL DISCHARGE DIAGNOSIS  Diagnosis: Chest pain  Assessment and Plan of Treatment: Stress test compeleted and negative for any acute issues that would indicate decreased blood flow or damage to the heart. For the abnormal beats (likely cause of chest discomfort you were feeling) seen on heart monitor, [facility]. Take medication as prescribed and follow up closely with cardiologist and primary medical doctor.

## 2022-05-26 NOTE — PATIENT PROFILE ADULT - FALL HARM RISK - UNIVERSAL INTERVENTIONS
Bed in lowest position, wheels locked, appropriate side rails in place/Call bell, personal items and telephone in reach/Instruct patient to call for assistance before getting out of bed or chair/Non-slip footwear when patient is out of bed/[place] to call system/Physically safe environment - no spills, clutter or unnecessary equipment/Purposeful Proactive Rounding/Room/bathroom lighting operational, light cord in reach

## 2022-05-26 NOTE — ED ADULT NURSE NOTE - OBJECTIVE STATEMENT
Pt c/o chest tightness. Pt states woke up from sleep with the feeling of chest tightness, mobile phone recorder was showing abnormal reading. Denies fever, nausea vomiting, SOB or dizziness.
fair minus

## 2022-05-26 NOTE — ED PROVIDER NOTE - PROGRESS NOTE DETAILS
pt now informs me he has ho aortic aneurysm, he had mri in the last 2 months, sees Dr. Arnett. 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 Pt is not having back pain, he describes palpitations at this time, will admit pt for cp, spoke with Dr. hennessy. results sung pt STEPHANIE Madrigal DO

## 2022-05-26 NOTE — H&P ADULT - NSHPSOCIALHISTORY_GEN_ALL_CORE
Remote smoking history (Quit 1980; 3 pack year); Rare EtOH; Denies illicit drug use.  Retired  Ambulates without assistive device

## 2022-05-26 NOTE — CONSULT NOTE ADULT - ASSESSMENT
66 year old man with atypical chest discomfort, PVCs, brief WCT, and anxiety -h/o normal LVEF     Plan  -Agree with nuclear stress today to screen for ischemia  -Agree with transition amlodipine to cardizem to help treat PVCs.  -He has normal LVEF, aortic enlargement  managed by Dr. Arnett - no signs of aortic pathology clinically at this time  -I believe symptoms caused by PVCs - may be exacerbated by anxiety  -if stress negative can d/c home with outpt follow up PVCs

## 2022-05-26 NOTE — H&P ADULT - ASSESSMENT
MEDICATIONS  (STANDING):  aspirin enteric coated 81 milliGRAM(s) Oral daily  atorvastatin 20 milliGRAM(s) Oral at bedtime  diltiazem    milliGRAM(s) Oral daily  doxazosin 1 milliGRAM(s) Oral daily  doxazosin 2 milliGRAM(s) Oral at bedtime    MEDICATIONS  (PRN):  calcium carbonate    500 mG (Tums) Chewable 3 Tablet(s) Chew every 6 hours PRN Dyspepsia  ondansetron Injectable 4 milliGRAM(s) IV Push every 6 hours PRN Nausea      ASSESSMENT/PLAN:    Chest pain.   PVCs on telemetry  HTN  HLD  Personal History of TIA  -place in observation with telemetry  -Troponin negative x 2  -EKG without ischemic changes.   -Discussed with cardiology. Keep patient NPO with plans for nuclear stress  -No TTE required at this time  -With PVCs patient known to have BB intolerance in the past. Switch Norvasc to CCB.   -ASA/Statin  -Continue doxazosin    Ascending Aortic Aneursym recently measured @ 4.1cm.  -Reported as chronic. Recent outpatient work up reported as stable  -Follow up outpatient.       DVT Prophylaxis: SCDs     MEDICATIONS  (STANDING):  aspirin enteric coated 81 milliGRAM(s) Oral daily  atorvastatin 20 milliGRAM(s) Oral at bedtime  diltiazem    milliGRAM(s) Oral daily  doxazosin 1 milliGRAM(s) Oral daily  doxazosin 2 milliGRAM(s) Oral at bedtime    MEDICATIONS  (PRN):  calcium carbonate    500 mG (Tums) Chewable 3 Tablet(s) Chew every 6 hours PRN Dyspepsia  ondansetron Injectable 4 milliGRAM(s) IV Push every 6 hours PRN Nausea      ASSESSMENT/PLAN:    Chest pain/discomfort likely from symptomatic PVCs. Resolved prior to admission. ACS rule out initiated.   PVCs on telemetry  HTN  HLD  Personal History of TIA  -place in observation with telemetry  -Troponin negative x 2  -EKG without ischemic changes.   -Discussed with cardiology. Keep patient NPO with plans for nuclear stress  -No TTE required at this time  -With PVCs patient known to have BB intolerance in the past. Switch Norvasc to CCB.   -ASA/Statin  -Continue doxazosin    Ascending Aortic Aneursym recently measured @ 4.1cm.  -Reported as chronic. Recent outpatient work up reported as stable  -Follow up outpatient.       DVT Prophylaxis: SCDs    Goals of Care (GOC)/Advance Care Planning (ACP)  Date of Discussion/evaluation: 5/26/2022  Purpose of Discussion: In the setting of advanced age and multiple comorbidities, discussion of patient's wished should there be any deterioration in health status.   Parties Present/Discussed with: Patient and myself  Patient's Decision making capacity at the time of discussion: AAOx4 and has full decision making capacity  Presentation: As above  GOC: Code Status, HCP, Alternative Feeding Methods, Blood product Transfusions    PLAN:  Code Status: FULL CODE  HCP:   Alternative Feeding methods: Would like to discuss or assess should the situation arise that it requires alternative feeding methods  Blood Product Transfusions: YES agreeable  Pressors: YES agreeable    ACP/GOC Time Spent: 16 minutes   MEDICATIONS  (STANDING):  aspirin enteric coated 81 milliGRAM(s) Oral daily  atorvastatin 20 milliGRAM(s) Oral at bedtime  diltiazem    milliGRAM(s) Oral daily  doxazosin 1 milliGRAM(s) Oral daily  doxazosin 2 milliGRAM(s) Oral at bedtime    MEDICATIONS  (PRN):  calcium carbonate    500 mG (Tums) Chewable 3 Tablet(s) Chew every 6 hours PRN Dyspepsia  ondansetron Injectable 4 milliGRAM(s) IV Push every 6 hours PRN Nausea      ASSESSMENT/PLAN:    Chest pain/discomfort likely from symptomatic PVCs. Resolved prior to admission. ACS rule out initiated.   PVCs on telemetry  HTN  HLD  Personal History of TIA  -place in observation with telemetry  -Troponin negative x 2  -EKG without ischemic changes.   -Discussed with cardiology. Keep patient NPO with plans for nuclear stress  -No TTE required at this time  -With PVCs patient known to have BB intolerance in the past. Switch Norvasc to CCB.   -ASA/Statin  -Continue doxazosin    Ascending Aortic Aneursym recently measured @ 4.1cm.  -Reported as chronic. Recent outpatient work up reported as stable  -Follow up outpatient.       DVT Prophylaxis: SCDs    Goals of Care (GOC)/Advance Care Planning (ACP)  Date of Discussion/evaluation: 5/26/2022  Purpose of Discussion: In the setting of advanced age and multiple comorbidities, discussion of patient's wished should there be any deterioration in health status.   Parties Present/Discussed with: Patient and myself  Patient's Decision making capacity at the time of discussion: AAOx4 and has full decision making capacity  Presentation: As above  GOC: Code Status, HCP, Alternative Feeding Methods, Blood product Transfusions    PLAN:  Code Status: FULL CODE  Alternative Feeding methods: Would like to discuss or assess should the situation arise that it requires alternative feeding methods  Blood Product Transfusions: YES agreeable  Pressors: YES agreeable    ACP/GOC Time Spent: 16 minutes   MEDICATIONS  (STANDING):  aspirin enteric coated 81 milliGRAM(s) Oral daily  atorvastatin 20 milliGRAM(s) Oral at bedtime  diltiazem    milliGRAM(s) Oral daily  doxazosin 1 milliGRAM(s) Oral daily  doxazosin 2 milliGRAM(s) Oral at bedtime    MEDICATIONS  (PRN):  calcium carbonate    500 mG (Tums) Chewable 3 Tablet(s) Chew every 6 hours PRN Dyspepsia  ondansetron Injectable 4 milliGRAM(s) IV Push every 6 hours PRN Nausea      ASSESSMENT/PLAN:    Chest pain/discomfort likely from symptomatic PVCs. Resolved prior to admission. ACS rule out initiated.   PVCs on telemetry  HTN  HLD  Personal History of TIA  -place in observation with telemetry  -Troponin negative x 2  -EKG without ischemic changes.   -Discussed with cardiology. Keep patient NPO with plans for nuclear stress  -No TTE required at this time  -With PVCs patient known to have BB intolerance in the past. Switch Norvasc to CCB.   -ASA/Statin  -Continue doxazosin  -TSH/Electrolytes WNL    Ascending Aortic Aneursym recently measured @ 4.1cm.  -Reported as chronic. Recent outpatient work up reported as stable  -Follow up outpatient.       DVT Prophylaxis: SCDs    Goals of Care (GOC)/Advance Care Planning (ACP)  Date of Discussion/evaluation: 5/26/2022  Purpose of Discussion: In the setting of advanced age and multiple comorbidities, discussion of patient's wished should there be any deterioration in health status.   Parties Present/Discussed with: Patient and myself  Patient's Decision making capacity at the time of discussion: AAOx4 and has full decision making capacity  Presentation: As above  GOC: Code Status, HCP, Alternative Feeding Methods, Blood product Transfusions    PLAN:  Code Status: FULL CODE  Alternative Feeding methods: Would like to discuss or assess should the situation arise that it requires alternative feeding methods  Blood Product Transfusions: YES agreeable  Pressors: YES agreeable    ACP/GOC Time Spent: 16 minutes

## 2022-05-26 NOTE — DISCHARGE NOTE NURSING/CASE MANAGEMENT/SOCIAL WORK - PATIENT PORTAL LINK FT
You can access the FollowMyHealth Patient Portal offered by [facility] by registering at the following website: [email or web address]. By joining [facility]’s FollowMyHealth portal, you will also be able to view your health information using other applications (apps) compatible with our system.

## 2022-05-26 NOTE — DISCHARGE NOTE PROVIDER - CARE PROVIDER_API CALL
Hong Klein)  Family Medicine  [address], [address]  [place] [address]  Phone: [phone]  Fax: [phone]  Established Patient  Follow Up Time: 7-10 Days    Gaye Corrigan)  Cardiovascular Disease; Internal Medicine  [address] [place] [address]  Phone: [phone]  Fax: [phone]  Established Patient  Follow Up Time: 7-10 Days

## 2022-05-26 NOTE — ED ADULT TRIAGE NOTE - CHIEF COMPLAINT QUOTE
Pt c/o chest tightness. Pt states mobile phone recorder was showing abnormal reading. Denies nausea vomiting or dizziness.

## 2022-05-26 NOTE — DISCHARGE NOTE PROVIDER - NSDCMRMEDTOKEN_GEN_ALL_CORE_FT
aspirin 81 mg oral tablet: 1 tab(s) orally once a day  dilTIAZem 120 mg/24 hours oral capsule, extended release: 1 cap(s) orally once a day  doxazosin 1 mg oral tablet: 1 tab(s) orally once a day (in the morning)  doxazosin 2 mg oral tablet: 1 tab(s) orally once a day (in the evening)  rosuvastatin 5 mg oral tablet: 1 tab(s) orally once a day (at bedtime)

## 2022-05-26 NOTE — H&P ADULT - NSICDXPASTSURGICALHX_GEN_ALL_CORE_FT
PAST SURGICAL HISTORY:  No significant past surgical history      PAST SURGICAL HISTORY:  History of appendectomy     History of exploratory laparotomy     History of tonsillectomy     S/P laparoscopic surgery 2014 lysis of adhesions

## 2022-05-26 NOTE — CONSULT NOTE ADULT - SUBJECTIVE AND OBJECTIVE BOX
Cardiology Consultation      HPI: 66 year old male h/o HTN, anxious depression, abdominal adhesion, high cholesterol, ?TIA, multiple medication intolerances, mild aortic root enlargement following with Dr. Arnett in aortic registry, now presents with nonspecific chest discomfort, PVCs, and brief WCT on telemetry.    He feels "bubbles" in his chest and uses [employer] and has registered PVCs. He came to ER.        PAST MEDICAL & SURGICAL HISTORY:  History of TIAs      HTN (hypertension)      HLD (hyperlipidemia)      Multiple abdomnal surgeries for bowel obstruction, adhesions          Allergies    No Known Allergies    Multiple Intolerances        SOCIAL HISTORY: Denies tobacco, etoh abuse or illicit drug use    FAMILY HISTORY:  No pertinent family history in first degree relatives        MEDICATIONS  (STANDING):  aspirin enteric coated 81 milliGRAM(s) Oral daily  atorvastatin 20 milliGRAM(s) Oral at bedtime  diltiazem    milliGRAM(s) Oral daily  doxazosin 1 milliGRAM(s) Oral daily  doxazosin 2 milliGRAM(s) Oral at bedtime    MEDICATIONS  (PRN):  calcium carbonate    500 mG (Tums) Chewable 3 Tablet(s) Chew every 6 hours PRN Dyspepsia  ondansetron Injectable 4 milliGRAM(s) IV Push every 6 hours PRN Nausea      Vital Signs Last 24 Hrs  T(C): 36.4 (26 May 2022 06:47), Max: 36.7 (26 May 2022 02:45)  T(F): 97.5 (26 May 2022 06:47), Max: 98 (26 May 2022 02:45)  HR: 63 (26 May 2022 07:46) (53 - 66)  BP: 129/66 (26 May 2022 07:46) (117/76 - 150/93)  BP(mean): 85 (26 May 2022 07:46) (85 - 108)  RR: 19 (26 May 2022 07:46) (15 - 19)  SpO2: 98% (26 May 2022 07:46) (95% - 98%)    REVIEW OF SYSTEMS:    CONSTITUTIONAL:  As per HPI.  HEENT:  Eyes:  No diplopia or blurred vision. ENT:  No earache, sore throat or runny nose.  CARDIOVASCULAR:  nonspecific chest discomfort, palpitaitons  RESPIRATORY:  No cough, shortness of breath, PND or orthopnea.  GASTROINTESTINAL:  Chronic nausea since the 90s  GENITOURINARY:  No dysuria, frequency or urgency.  MUSCULOSKELETAL:  As per HPI.  SKIN:  No change in skin, hair or nails.  NEUROLOGIC:  No paresthesias, fasciculations, seizures or weakness.  PSYCHIATRIC:  No disorder of thought or mood.  ENDOCRINE:  No heat or cold intolerance, polyuria or polydipsia.  HEMATOLOGICAL:  No easy bruising or bleedings.     PHYSICAL EXAMINATION:    GENERAL APPEARANCE:  Pt. is not currently dyspneic, in no distress. Pt. is alert, oriented, and pleasant.  HEENT:  Pupils are normal and react normally. No icterus. Mucous membranes well colored.  NECK:  Supple. No lymphadenopathy. Jugular venous pressure not elevated. Carotids equal.   HEART:   The cardiac impulse has a normal quality. There are no murmurs, rubs or gallops noted  CHEST:  Chest is clear to auscultation. Normal respiratory effort.  ABDOMEN:  Soft and nontender.   EXTREMITIES:  There is no edema.   SKIN:  No rash or significant lesions are noted.          LABS:                        15.8   5.95  )-----------( 270      ( 26 May 2022 02:58 )             46.7     05-26    142  |  110<H>  |  12  ----------------------------<  104<H>  3.8   |  26  |  0.87    Ca    8.9      26 May 2022 02:58    TPro  7.5  /  Alb  3.7  /  TBili  0.3  /  DBili  x   /  AST  25  /  ALT  33  /  AlkPhos  83  05-26    LIVER FUNCTIONS - ( 26 May 2022 02:58 )  Alb: 3.7 g/dL / Pro: 7.5 gm/dL / ALK PHOS: 83 U/L / ALT: 33 U/L / AST: 25 U/L / GGT: x                     EKG: NSR    TELEMETRY: brief WCT, PVC    CARDIAC TESTS: h/o normal LVEF    RADIOLOGY & ADDITIONAL STUDIES: CXR pending    ASSESSMENT & PLAN:

## 2022-05-26 NOTE — DISCHARGE NOTE NURSING/CASE MANAGEMENT/SOCIAL WORK - NSDCPEFALRISK_GEN_ALL_CORE
For information on Fall & Injury Prevention, visit: [email or web address] OR  [email or web address] OR  https://www.cdc.gov/steadi/patient.html

## 2022-05-26 NOTE — ED ADULT NURSE REASSESSMENT NOTE - NS ED NURSE REASSESS COMMENT FT1
Pt stable. VS WNL. Pt seen by cardio and hospitalist Dada. Pt now NPO for nuclear stress test as per cardio. Pt ready to move to 3E. Report given.

## 2022-05-26 NOTE — H&P ADULT - NSICDXPASTMEDICALHX_GEN_ALL_CORE_FT
PAST MEDICAL HISTORY:  History of TIAs     HLD (hyperlipidemia)     HTN (hypertension)      PAST MEDICAL HISTORY:  Ascending aortic aneurysm Reported as 4.1cm on 5/2022    History of TIAs     HLD (hyperlipidemia)     HTN (hypertension)

## 2022-05-26 NOTE — H&P ADULT - HISTORY OF PRESENT ILLNESS
66M with PMH of TIA and Ascending Aortic Aneursym (4.1cm; Recently evaluated and reported as stable), Depression, HTN, HLD, Chronic Abdominal adhesions with associated chronic nausea and intermittent abdominal pain presents with Chest pain. Started yesterday. Intermittent, substernal, "bubbling/fluttering" sensation that was 2-4/10, non-radiating. Non-positional, non-exertion and not associated with PO intake. No SOB, dizziness, syncope, pleurisy. Chronic nausea without vomiting unchanged. Some unquantified weight gain attributed to custodial and decreased activity. Has heart rhythm monitoring that showed some PVC's.    In the ER, Tmax 98, HR 66, /93, RR 18, SpO2 95-98% on RA. CBC, CMP unremarkable. Troponin negative x 2. EKG with NSR with sinus arrythmia. CXRAY negative for acute cardiopulmonary process.  66M with PMH of TIA and Ascending Aortic Aneursym (4.1cm; Recently evaluated and reported as stable), Depression, HTN, HLD, Chronic Abdominal adhesions with associated chronic nausea and intermittent abdominal pain presents with Chest pain. Started yesterday. Intermittent, substernal, "bubbling/fluttering" sensation that was 2-4/10, non-radiating. Non-positional, non-exertion and not associated with PO intake. No SOB, dizziness, syncope, pleurisy. Chronic nausea without vomiting unchanged. Some unquantified weight gain attributed to custodial and decreased activity. Has heart rhythm monitoring that showed some PVC's.    In the ER, Tmax 98, HR 66, /93, RR 18, SpO2 95-98% on RA. CBC, CMP unremarkable. Troponin negative x 2. EKG with NSR with sinus arrythmia. CXRAY negative for acute cardiopulmonary process.

## 2022-05-26 NOTE — DISCHARGE NOTE PROVIDER - HOSPITAL COURSE
66M with PMH of TIA and Ascending Aortic Aneursym (4.1cm; Recently evaluated and reported as stable), Depression, HTN, HLD, Chronic Abdominal adhesions with associated chronic nausea and intermittent abdominal pain presents with Chest pain. Started yesterday. Intermittent, substernal, "bubbling/fluttering" sensation that was 2-4/10, non-radiating. Non-positional, non-exertion and not associated with PO intake. No SOB, dizziness, syncope, pleurisy. Chronic nausea without vomiting unchanged. Some unquantified weight gain attributed to intermediate and decreased activity. Has heart rhythm monitoring that showed some PVC's.    In the ER, Tmax 98, HR 66, /93, RR 18, SpO2 95-98% on RA. CBC, CMP unremarkable. Troponin negative x 2. EKG with NSR with sinus arrythmia. CXRAY negative for acute cardiopulmonary process. ASSESSMENT/PLAN:    Chest pain/discomfort likely from symptomatic PVCs. Resolved prior to admission. ACS rule out initiated.   PVCs on telemetry  HTN  HLD  Personal History of TIA  -place in observation with telemetry  -Troponin negative x 2  -EKG without ischemic changes.   -Discussed with cardiology. Keep patient NPO with plans for nuclear stress  -No TTE required at this time  -With PVCs patient known to have BB intolerance in the past. Switch Norvasc to CCB.   -ASA/Statin  -Continue doxazosin  -TSH/Electrolytes WNL    Ascending Aortic Aneursym recently measured @ 4.1cm.  -Reported as chronic. Recent outpatient work up reported as stable  -Follow up outpatient.     Stress test negative for inducible ischemia. Tolerating switch from norvasc to cardizem. Cleared by cardiology. Discharge home in stable condition and close outpatient follow up with cardiology and PMD.   Discharge Management: 36 minutes  Date of Discharge/Service: 5/26/2022

## 2022-05-26 NOTE — ED PROVIDER NOTE - OBJECTIVE STATEMENT
65 yo male with ho tia, not on anticoagulation pw chest pain described as pressure tonight while at home. Pt states he has appointment with Dr. Gaye Corrigan today but came in due to pvc on his phone ekg which is accurate. no dypsnea or sob, noradiation of pressure

## 2022-05-26 NOTE — ED PROVIDER NOTE - PHYSICAL EXAMINATION
Gen:  Well appearing in NAD  Head:  NC/AT  HEENT: pupils perrl,no pharyngeal erythema, uvula midline  Cardiac: S1S2, RRR  Abd: Soft, non tender  Resp: No distress, CTA   musculoskeletal:: no deformities, no swelling, strength +5/+5  Skin: warm and dry as visualized, no rashes  Neuro: SOUZA, aao x 4  Psych:alert, cooperative, appropriate mood and affect for situation

## 2022-05-26 NOTE — H&P ADULT - NSICDXFAMILYHX_GEN_ALL_CORE_FT
FAMILY HISTORY:  No pertinent family history in first degree relatives     FAMILY HISTORY:  Father  Still living? Yes, Estimated age:   Family history of peripheral vascular disease, Age at diagnosis: Age Unknown  FH: HTN (hypertension), Age at diagnosis: Age Unknown    Mother  Still living? No  FH: CML (chronic myeloid leukemia), Age at diagnosis: Age Unknown  FH: HTN (hypertension), Age at diagnosis: Age Unknown

## 2022-05-26 NOTE — ED PROVIDER NOTE - CLINICAL SUMMARY MEDICAL DECISION MAKING FREE TEXT BOX
pt with chest pain and pvc on telemetry will get labs including troponin, ekg and xray chest possible admission

## 2022-05-26 NOTE — H&P ADULT - NSHPPHYSICALEXAM_GEN_ALL_CORE
PHYSICAL EXAM:    T(C): 36.4 (05-26-22 @ 06:47), Max: 36.7 (05-26-22 @ 02:45)  HR: 63 (05-26-22 @ 07:46) (53 - 66)  BP: 129/66 (05-26-22 @ 07:46) (117/76 - 150/93)  RR: 19 (05-26-22 @ 07:46) (15 - 19)  SpO2: 98% (05-26-22 @ 07:46) (95% - 98%)    General: AAOx3; NAD  Head: AT/NC  ENT: Moist Mucous Membranes; No Injury  Eyes: EOMI; PERRL  Neck: Non-tender; No JVD  CVS: RRR, S1&S2, No murmur, No edema  Respiratory: Lungs CTA B/L; Normal Respiratory Effort  Abdomen/GI: Soft, non-tender, non-distended, no guarding, no rebound, normal bowel sounds  : No bladder distention, No Delgado  Extremities: No cyanosis, No clubbing, No edema  MSK: No CVA tenderness, Normal ROM, No injury  Neuro: AAOx3, CNII-XII grossly intact, non-focal  Psych: Appropriate, Cooperative, No depression, No anxiety  Skin: Clean, Dry and Intact

## 2022-05-26 NOTE — H&P ADULT - NSHPLABSRESULTS_GEN_ALL_CORE
15.8   5.95  )-----------( 270      ( 26 May 2022 02:58 )             46.7     05-26    142  |  110<H>  |  12  ----------------------------<  104<H>  3.8   |  26  |  0.87    Ca    8.9      26 May 2022 02:58    TPro  7.5  /  Alb  3.7  /  TBili  0.3  /  DBili  x   /  AST  25  /  ALT  33  /  AlkPhos  83  05-26    COVID-19 PCR: NotDetec (26 May 2022 02:58)    CAPILLARY BLOOD GLUCOSE        Troponin I, High Sensitivity Result: 6.05:Troponin I, High Sensitivity Result: 6.77:COVID-19 PCR: NotDetec:     CXRAY (5/26): No acute cardiopulmonary process  EKG (5/26): NSR with sinus arrythmia.

## 2022-05-26 NOTE — DISCHARGE NOTE PROVIDER - PROVIDER TOKENS
PROVIDER:[TOKEN:[[id number]],FOLLOWUP:[7-10 Days],ESTABLISHEDPATIENT:[T]],PROVIDER:[TOKEN:[[id number]],FOLLOWUP:[7-10 Days],ESTABLISHEDPATIENT:[T]]

## 2022-05-27 ENCOUNTER — APPOINTMENT (OUTPATIENT)
Dept: FAMILY MEDICINE | Facility: CLINIC | Age: 67
End: 2022-05-27
Payer: MEDICARE

## 2022-05-27 ENCOUNTER — APPOINTMENT (OUTPATIENT)
Dept: FAMILY MEDICINE | Facility: CLINIC | Age: 67
End: 2022-05-27

## 2022-05-27 ENCOUNTER — NON-APPOINTMENT (OUTPATIENT)
Age: 67
End: 2022-05-27

## 2022-05-27 VITALS — SYSTOLIC BLOOD PRESSURE: 120 MMHG | DIASTOLIC BLOOD PRESSURE: 74 MMHG

## 2022-05-27 VITALS
BODY MASS INDEX: 27.4 KG/M2 | SYSTOLIC BLOOD PRESSURE: 110 MMHG | OXYGEN SATURATION: 97 % | HEART RATE: 70 BPM | WEIGHT: 185 LBS | TEMPERATURE: 97.7 F | HEIGHT: 69 IN | DIASTOLIC BLOOD PRESSURE: 62 MMHG

## 2022-05-27 DIAGNOSIS — I49.3 VENTRICULAR PREMATURE DEPOLARIZATION: ICD-10-CM

## 2022-05-27 PROBLEM — Z00.00 ENCOUNTER FOR PREVENTIVE HEALTH EXAMINATION: Noted: 2022-05-27

## 2022-05-27 PROCEDURE — 99214 OFFICE O/P EST MOD 30 MIN: CPT

## 2022-05-27 RX ORDER — ROSUVASTATIN CALCIUM 5 MG/1
0 TABLET ORAL
Qty: 0 | Refills: 1 | DISCHARGE

## 2022-05-27 RX ORDER — AMLODIPINE BESYLATE 2.5 MG/1
0 TABLET ORAL
Qty: 0 | Refills: 1 | DISCHARGE

## 2022-05-27 RX ORDER — GABAPENTIN 100 MG/1
100 CAPSULE ORAL
Qty: 60 | Refills: 1 | Status: DISCONTINUED | COMMUNITY
Start: 2021-09-02 | End: 2022-05-27

## 2022-05-27 RX ORDER — BUPROPION HYDROCHLORIDE 75 MG/1
75 TABLET, FILM COATED ORAL TWICE DAILY
Refills: 0 | Status: DISCONTINUED | COMMUNITY
End: 2022-05-27

## 2022-05-27 NOTE — ASSESSMENT
[FreeTextEntry1] : He is here for ER follow up after an episode of chest pain and palpitations, felt to be due to PVCs. His amlodipine was switched to Diltiazem. His blood pressure is well controlled and his pulse is normal today. He feels that this PVCs occur when he is relaxed, mainly at night while falling asleep. \par \par He is planning to see Dr. Corrigan, his cardiologist, for follow up after this visit. He had a Holter monitor done in 9/2021 which he believes was normal. 
Ears: no ear pain and no hearing problems.Nose: no nasal congestion and no nasal drainage.Mouth/Throat: no dysphagia, no hoarseness and no throat pain.Neck: no lumps, no pain, no stiffness and no swollen glands.

## 2022-05-27 NOTE — REVIEW OF SYSTEMS
[Fever] : no fever [Chills] : no chills [Fatigue] : no fatigue [Vision Problems] : no vision problems [Hearing Loss] : no hearing loss [Shortness Of Breath] : no shortness of breath [Cough] : no cough [Dyspnea on Exertion] : not dyspnea on exertion [Abdominal Pain] : no abdominal pain [Dysuria] : no dysuria [Hematuria] : no hematuria [Joint Pain] : no joint pain [Back Pain] : no back pain [Anxiety] : no anxiety [Depression] : no depression [FreeTextEntry5] : see HPI [de-identified] : see HPI

## 2022-05-27 NOTE — PLAN
[FreeTextEntry1] : Follow up with Dr. Corrigan. He may need another Holter monitor to document his frequency of PVCs.\par \par He admits that he has more PVCs when he feels stressed. He was previously taking Wellbutrin but stopped this a few months ago. He felt that he had difficulty swallowing from Wellbutrin. He does not feel particularly anxious or depressed since stopping it and does not feel that he needs medication for anxiety or depression.\par \par He also stopped taking his vitamin D supplement. Since vitamin D deficiency can cause fatigue he was advised to restart this.

## 2022-05-27 NOTE — HISTORY OF PRESENT ILLNESS
[FreeTextEntry1] : ALYCIA PIERSON is a 66 year old male here for a follow up visit.  [de-identified] : Patient is here for hospital follow up. He presented to the ER yesterday morning with a one day history of a substernal fluttering sensation and chest pain. He was admitted to Telemetry and had 2 negative sets of cardiac enzymes. He had PVCs noted on telemetry and this was felt to be the cause of his symptoms. Since he had not tolerated beta blockers in the past his Amlodipine was switched to Diltiazem to treat both his blood pressure and PVCs. He was discharged yesterday afternoon and told to follow up with his PMD. He took his first dose of Diltiazem and still had a lot of PVCs last night. His wife states that for the past 2-3 weeks he has been very fatigued as well.

## 2022-07-05 NOTE — PATIENT PROFILE ADULT - NSPROGENSOURCEINFO_GEN_A_NUR
patient
Comment: Patient with pruritus and pictures of urticarial rash that has improved with TAC and SARNA, as well as Zyrtec. When she stopped the zyrtec, pruritus returned mildly. Can restart if pruritus worsens, otherwise cont Cerave daily and SARNA PRN.
Render Risk Assessment In Note?: no
Detail Level: Zone

## 2022-07-26 ENCOUNTER — FORM ENCOUNTER (OUTPATIENT)
Age: 67
End: 2022-07-26

## 2022-07-28 ENCOUNTER — NON-APPOINTMENT (OUTPATIENT)
Age: 67
End: 2022-07-28

## 2022-07-28 DIAGNOSIS — Z82.49 FAMILY HISTORY OF ISCHEMIC HEART DISEASE AND OTHER DISEASES OF THE CIRCULATORY SYSTEM: ICD-10-CM

## 2022-07-28 DIAGNOSIS — R00.1 BRADYCARDIA, UNSPECIFIED: ICD-10-CM

## 2022-07-28 PROBLEM — I10 ESSENTIAL (PRIMARY) HYPERTENSION: Chronic | Status: ACTIVE | Noted: 2022-05-26

## 2022-07-28 PROBLEM — E78.5 HYPERLIPIDEMIA, UNSPECIFIED: Chronic | Status: ACTIVE | Noted: 2022-05-26

## 2022-07-28 PROBLEM — Z86.73 PERSONAL HISTORY OF TRANSIENT ISCHEMIC ATTACK (TIA), AND CEREBRAL INFARCTION WITHOUT RESIDUAL DEFICITS: Chronic | Status: ACTIVE | Noted: 2022-05-26

## 2022-07-28 PROBLEM — I71.2 THORACIC AORTIC ANEURYSM, WITHOUT RUPTURE: Chronic | Status: ACTIVE | Noted: 2022-05-26

## 2022-07-29 ENCOUNTER — NON-APPOINTMENT (OUTPATIENT)
Age: 67
End: 2022-07-29

## 2022-07-29 ENCOUNTER — APPOINTMENT (OUTPATIENT)
Dept: ELECTROPHYSIOLOGY | Facility: CLINIC | Age: 67
End: 2022-07-29

## 2022-07-29 VITALS
OXYGEN SATURATION: 97 % | BODY MASS INDEX: 27.25 KG/M2 | HEIGHT: 69 IN | DIASTOLIC BLOOD PRESSURE: 90 MMHG | HEART RATE: 73 BPM | WEIGHT: 184 LBS | SYSTOLIC BLOOD PRESSURE: 133 MMHG

## 2022-07-29 DIAGNOSIS — R00.2 PALPITATIONS: ICD-10-CM

## 2022-07-29 DIAGNOSIS — I47.1 SUPRAVENTRICULAR TACHYCARDIA: ICD-10-CM

## 2022-07-29 PROCEDURE — 93000 ELECTROCARDIOGRAM COMPLETE: CPT

## 2022-07-29 PROCEDURE — 99204 OFFICE O/P NEW MOD 45 MIN: CPT

## 2022-07-29 NOTE — HISTORY OF PRESENT ILLNESS
[FreeTextEntry1] : 66-year-old male with history of hypertension vasovagal syncope recurrent palpitations.  Recent M cot from 6/2022 shows brief episodes of AT/SVT nonsustained 12 seconds duration at 160 bpm (on 6.21.22 12:42pm), brief AIVR, rare PVC <1% and junctional rhythm during napping at 5 pm confirmed by the patient, also nocturnal bradycardia. Patient had TIA symptoms on 5/2021 at that time underwent JADE showing no intracardiac thrombus no PFO normal biventricular size and function.\par Pt feels occasional chest tightness brief episodes and only occur during the rest. No exertional cp. Pt does not tolerate bb or cardizem due to generalized weakness. occasionally he may feel palpitations during PVCs\par 7.29.22 ECG NSR 60s bpm\par \par cardiac work up:\par SPECT MPI 5/2022 shows no evidence of ischemia or scar, nml LVEF

## 2022-07-29 NOTE — ASSESSMENT
[FreeTextEntry1] : 66-year-old male with history of hypertension vasovagal syncope who has occasional atypical chest discomfort and may be symptomatic from rare PVCs he has been on diltiazem probably suppressing PVCs more.  Recent monitor shows only nonsustained atrial arrhythmia for 12 seconds brief AI VR rare PVC.  Patient has nocturnal bradycardia.  He has some fatigue at times and when he checks [employer] val heart rate is in the 40s.\par In absence of structural heart disease and normal recent stress test reasonable to stop Cardizem controlled hypertension with amlodipine and if he has worsening palpitations or increase in PVC then would further discuss management whether its trial of other calcium channel blocker or AAD.\par Follow-up with cardiology Dr. Corrigan repeat monitor if palpitations worsen\par Follow-up with EP if symptoms recur\par \par Total length of time spent with this patient was 45 minutes and more then half of the time was spent face to face with the patient as well as counseling and coordination of care as stated above.\par

## 2022-09-15 ENCOUNTER — FORM ENCOUNTER (OUTPATIENT)
Age: 67
End: 2022-09-15

## 2022-09-26 ENCOUNTER — FORM ENCOUNTER (OUTPATIENT)
Age: 67
End: 2022-09-26

## 2022-11-18 ENCOUNTER — APPOINTMENT (OUTPATIENT)
Dept: UROLOGY | Facility: CLINIC | Age: 67
End: 2022-11-18

## 2022-11-22 ENCOUNTER — NON-APPOINTMENT (OUTPATIENT)
Age: 67
End: 2022-11-22

## 2022-11-22 DIAGNOSIS — R53.83 OTHER FATIGUE: ICD-10-CM

## 2022-11-23 ENCOUNTER — APPOINTMENT (OUTPATIENT)
Dept: FAMILY MEDICINE | Facility: CLINIC | Age: 67
End: 2022-11-23

## 2022-11-23 PROCEDURE — 36415 COLL VENOUS BLD VENIPUNCTURE: CPT

## 2022-11-24 LAB
25(OH)D3 SERPL-MCNC: 28.1 NG/ML
ALBUMIN SERPL ELPH-MCNC: 4.6 G/DL
ALP BLD-CCNC: 86 U/L
ALT SERPL-CCNC: 29 U/L
ANION GAP SERPL CALC-SCNC: 9 MMOL/L
AST SERPL-CCNC: 30 U/L
BASOPHILS # BLD AUTO: 0.03 K/UL
BASOPHILS NFR BLD AUTO: 0.4 %
BILIRUB SERPL-MCNC: 0.7 MG/DL
BUN SERPL-MCNC: 13 MG/DL
CALCIUM SERPL-MCNC: 9.9 MG/DL
CHLORIDE SERPL-SCNC: 104 MMOL/L
CO2 SERPL-SCNC: 28 MMOL/L
CREAT SERPL-MCNC: 1.01 MG/DL
EGFR: 82 ML/MIN/1.73M2
EOSINOPHIL # BLD AUTO: 0.13 K/UL
EOSINOPHIL NFR BLD AUTO: 1.8 %
GLUCOSE SERPL-MCNC: 98 MG/DL
HCT VFR BLD CALC: 48.5 %
HGB BLD-MCNC: 16 G/DL
IMM GRANULOCYTES NFR BLD AUTO: 0.1 %
LYMPHOCYTES # BLD AUTO: 2.13 K/UL
LYMPHOCYTES NFR BLD AUTO: 29.5 %
MAN DIFF?: NORMAL
MCHC RBC-ENTMCNC: 30.7 PG
MCHC RBC-ENTMCNC: 33 GM/DL
MCV RBC AUTO: 92.9 FL
MONOCYTES # BLD AUTO: 0.79 K/UL
MONOCYTES NFR BLD AUTO: 11 %
NEUTROPHILS # BLD AUTO: 4.12 K/UL
NEUTROPHILS NFR BLD AUTO: 57.2 %
PLATELET # BLD AUTO: 245 K/UL
POTASSIUM SERPL-SCNC: 4.1 MMOL/L
PROT SERPL-MCNC: 7.2 G/DL
PSA SERPL-MCNC: 11.5 NG/ML
RBC # BLD: 5.22 M/UL
RBC # FLD: 13.2 %
SODIUM SERPL-SCNC: 141 MMOL/L
VIT B12 SERPL-MCNC: 461 PG/ML
WBC # FLD AUTO: 7.21 K/UL

## 2022-11-28 ENCOUNTER — NON-APPOINTMENT (OUTPATIENT)
Age: 67
End: 2022-11-28

## 2022-11-29 ENCOUNTER — TRANSCRIPTION ENCOUNTER (OUTPATIENT)
Age: 67
End: 2022-11-29

## 2022-12-01 ENCOUNTER — APPOINTMENT (OUTPATIENT)
Dept: FAMILY MEDICINE | Facility: CLINIC | Age: 67
End: 2022-12-01

## 2022-12-01 PROCEDURE — 36415 COLL VENOUS BLD VENIPUNCTURE: CPT

## 2022-12-02 ENCOUNTER — TRANSCRIPTION ENCOUNTER (OUTPATIENT)
Age: 67
End: 2022-12-02

## 2022-12-02 LAB
CHOLEST SERPL-MCNC: 145 MG/DL
HDLC SERPL-MCNC: 62 MG/DL
LDLC SERPL CALC-MCNC: 58 MG/DL
NONHDLC SERPL-MCNC: 83 MG/DL
TRIGL SERPL-MCNC: 121 MG/DL

## 2022-12-03 LAB
PSA FREE FLD-MCNC: 11 %
PSA FREE SERPL-MCNC: 1.23 NG/ML
PSA SERPL-MCNC: 11.5 NG/ML

## 2022-12-05 NOTE — ED ADULT NURSE NOTE - NS ED NURSE LEVEL OF CONSCIOUSNESS SPEECH
Patient seeing Dr. Diehl today and will be assessed at that time. Labs to be ordered per MD.   Speaking Coherently

## 2022-12-07 ENCOUNTER — APPOINTMENT (OUTPATIENT)
Dept: MRI IMAGING | Facility: CLINIC | Age: 67
End: 2022-12-07

## 2022-12-07 ENCOUNTER — RESULT REVIEW (OUTPATIENT)
Age: 67
End: 2022-12-07

## 2022-12-07 ENCOUNTER — OUTPATIENT (OUTPATIENT)
Dept: OUTPATIENT SERVICES | Facility: HOSPITAL | Age: 67
LOS: 1 days | End: 2022-12-07
Payer: MEDICARE

## 2022-12-07 DIAGNOSIS — R97.20 ELEVATED PROSTATE SPECIFIC ANTIGEN [PSA]: ICD-10-CM

## 2022-12-07 PROCEDURE — 76498P: CUSTOM | Mod: 26,MH

## 2022-12-07 PROCEDURE — A9585: CPT

## 2022-12-07 PROCEDURE — 72197 MRI PELVIS W/O & W/DYE: CPT | Mod: 26,MH

## 2022-12-07 PROCEDURE — 72197 MRI PELVIS W/O & W/DYE: CPT

## 2022-12-07 PROCEDURE — 76498 UNLISTED MR PROCEDURE: CPT

## 2022-12-13 ENCOUNTER — RX RENEWAL (OUTPATIENT)
Age: 67
End: 2022-12-13

## 2022-12-20 ENCOUNTER — APPOINTMENT (OUTPATIENT)
Dept: UROLOGY | Facility: CLINIC | Age: 67
End: 2022-12-20

## 2022-12-20 VITALS
BODY MASS INDEX: 27.25 KG/M2 | RESPIRATION RATE: 17 BRPM | WEIGHT: 184 LBS | TEMPERATURE: 97.2 F | SYSTOLIC BLOOD PRESSURE: 149 MMHG | HEIGHT: 69 IN | DIASTOLIC BLOOD PRESSURE: 87 MMHG | HEART RATE: 60 BPM

## 2022-12-20 DIAGNOSIS — R97.20 ELEVATED PROSTATE, SPECIFIC ANTIGEN [PSA]: ICD-10-CM

## 2022-12-20 PROCEDURE — 99212 OFFICE O/P EST SF 10 MIN: CPT

## 2022-12-20 NOTE — ASSESSMENT
[FreeTextEntry1] : PSA 11.1 MRI 57 cc prostate size and PRAD 4 . Discussed the need for the biopsy and the implications \par Schedule biopsy

## 2022-12-20 NOTE — HISTORY OF PRESENT ILLNESS
[None] : no symptoms [FreeTextEntry1] : Elevated PSA of 11.1 Previously 7.77 It has been elevated in the past and they went to [facility] and nothing was done \par

## 2022-12-27 ENCOUNTER — APPOINTMENT (OUTPATIENT)
Dept: FAMILY MEDICINE | Facility: CLINIC | Age: 67
End: 2022-12-27

## 2022-12-27 VITALS
TEMPERATURE: 97.9 F | DIASTOLIC BLOOD PRESSURE: 78 MMHG | HEART RATE: 57 BPM | SYSTOLIC BLOOD PRESSURE: 132 MMHG | HEIGHT: 69 IN | OXYGEN SATURATION: 97 % | BODY MASS INDEX: 27.25 KG/M2 | WEIGHT: 184 LBS

## 2022-12-27 DIAGNOSIS — R42 DIZZINESS AND GIDDINESS: ICD-10-CM

## 2022-12-27 PROCEDURE — 99213 OFFICE O/P EST LOW 20 MIN: CPT

## 2022-12-27 NOTE — PHYSICAL EXAM
[Fundoscopic Exam Performed] : fundoscopic ~T exam ~C was performed [No Carotid Bruits] : no carotid bruits [No Edema] : there was no peripheral edema [Grossly Normal Strength/Tone] : grossly normal strength/tone [No Rash] : no rash [Coordination Grossly Intact] : coordination grossly intact [No Focal Deficits] : no focal deficits [Normal Gait] : normal gait [Normal] : affect was normal and insight and judgment were intact

## 2022-12-27 NOTE — HISTORY OF PRESENT ILLNESS
[FreeTextEntry8] : Patient presents with dizziness. Symptoms presented this past Sunday morning which included lightheadedness, slow responses, neurological symptoms including lower back pain and left arm pain, and vision problems in both eyes; saw gradient of colors in the center of both eyes, primarily the right. He has a history of TIAs, however, denies any weakness, facial drooping, and headache; sees neurologist Dr. Bunn for this. He has not had a dizziness episode since Sunday which had cleared up within 15 minutes. Scheduled for a prostate biopsy tomorrow and was directed to stop taking Aspirin since Friday; believes this a possible cause of his symptoms. He has also been taking vitamin D and B12 supplements for his back and arm pain; affirms improvement following taking them.

## 2022-12-27 NOTE — PLAN
[FreeTextEntry1] : Since he is scheduled for the prostate biopsy tomorrow and has had no further episodes since Sunday I recommended he stay off of aspirin until the biopsy but restart it ASAP afterwards. He will follow up with Dr. Bunn to discuss what to do if he needs to stop the aspirin again in the future, for example for another prostate biopsy or for surgery.

## 2022-12-27 NOTE — REVIEW OF SYSTEMS
[Dizziness] : dizziness [Negative] : Heme/Lymph [Vision Problems] : vision problems [Muscle Pain] : muscle pain [Back Pain] : back pain [de-identified] : light headedness, slow speech

## 2022-12-27 NOTE — ASSESSMENT
[FreeTextEntry1] : He had a brief, self-limited episode of dizziness and visual disturbance 2 days ago. He has had no further symptoms. He had stopped his aspirin 2 days prior in preparation for his upcoming prostate biopsy tomorrow. It is unclear if his symptoms indicate an ocular migraine or TIA.

## 2022-12-28 ENCOUNTER — OUTPATIENT (OUTPATIENT)
Dept: OUTPATIENT SERVICES | Facility: HOSPITAL | Age: 67
LOS: 1 days | End: 2022-12-28
Payer: MEDICARE

## 2022-12-28 ENCOUNTER — APPOINTMENT (OUTPATIENT)
Dept: UROLOGY | Facility: CLINIC | Age: 67
End: 2022-12-28
Payer: MEDICARE

## 2022-12-28 VITALS — SYSTOLIC BLOOD PRESSURE: 149 MMHG | HEART RATE: 53 BPM | DIASTOLIC BLOOD PRESSURE: 87 MMHG

## 2022-12-28 VITALS — SYSTOLIC BLOOD PRESSURE: 151 MMHG | DIASTOLIC BLOOD PRESSURE: 91 MMHG | HEART RATE: 73 BPM

## 2022-12-28 DIAGNOSIS — K66.0 PERITONEAL ADHESIONS (POSTPROCEDURAL) (POSTINFECTION): Chronic | ICD-10-CM

## 2022-12-28 DIAGNOSIS — Z90.49 ACQUIRED ABSENCE OF OTHER SPECIFIED PARTS OF DIGESTIVE TRACT: Chronic | ICD-10-CM

## 2022-12-28 DIAGNOSIS — R35.0 FREQUENCY OF MICTURITION: ICD-10-CM

## 2022-12-28 DIAGNOSIS — K35.2 ACUTE APPENDICITIS WITH GENERALIZED PERITONITIS: Chronic | ICD-10-CM

## 2022-12-28 DIAGNOSIS — K56.69 OTHER INTESTINAL OBSTRUCTION: Chronic | ICD-10-CM

## 2022-12-28 DIAGNOSIS — Z98.890 OTHER SPECIFIED POSTPROCEDURAL STATES: Chronic | ICD-10-CM

## 2022-12-28 DIAGNOSIS — Z90.89 ACQUIRED ABSENCE OF OTHER ORGANS: Chronic | ICD-10-CM

## 2022-12-28 PROCEDURE — 76377 3D RENDER W/INTRP POSTPROCES: CPT | Mod: 26

## 2022-12-28 PROCEDURE — 55700: CPT | Mod: 22

## 2022-12-28 PROCEDURE — 76942 ECHO GUIDE FOR BIOPSY: CPT | Mod: 26,59

## 2022-12-28 PROCEDURE — 76942 ECHO GUIDE FOR BIOPSY: CPT | Mod: 59

## 2022-12-28 PROCEDURE — 55700: CPT

## 2022-12-29 ENCOUNTER — NON-APPOINTMENT (OUTPATIENT)
Age: 67
End: 2022-12-29

## 2022-12-29 DIAGNOSIS — R97.20 ELEVATED PROSTATE SPECIFIC ANTIGEN [PSA]: ICD-10-CM

## 2023-01-03 ENCOUNTER — OUTPATIENT (OUTPATIENT)
Dept: OUTPATIENT SERVICES | Facility: HOSPITAL | Age: 68
LOS: 1 days | End: 2023-01-03
Payer: MEDICARE

## 2023-01-03 ENCOUNTER — NON-APPOINTMENT (OUTPATIENT)
Age: 68
End: 2023-01-03

## 2023-01-03 ENCOUNTER — APPOINTMENT (OUTPATIENT)
Dept: UROLOGY | Facility: CLINIC | Age: 68
End: 2023-01-03
Payer: MEDICARE

## 2023-01-03 VITALS
RESPIRATION RATE: 16 BRPM | DIASTOLIC BLOOD PRESSURE: 79 MMHG | SYSTOLIC BLOOD PRESSURE: 129 MMHG | HEART RATE: 80 BPM | BODY MASS INDEX: 27.25 KG/M2 | WEIGHT: 184 LBS | HEIGHT: 69 IN

## 2023-01-03 DIAGNOSIS — K66.0 PERITONEAL ADHESIONS (POSTPROCEDURAL) (POSTINFECTION): Chronic | ICD-10-CM

## 2023-01-03 DIAGNOSIS — Z98.890 OTHER SPECIFIED POSTPROCEDURAL STATES: Chronic | ICD-10-CM

## 2023-01-03 DIAGNOSIS — K56.69 OTHER INTESTINAL OBSTRUCTION: Chronic | ICD-10-CM

## 2023-01-03 DIAGNOSIS — K35.2 ACUTE APPENDICITIS WITH GENERALIZED PERITONITIS: Chronic | ICD-10-CM

## 2023-01-03 DIAGNOSIS — R31.0 GROSS HEMATURIA: ICD-10-CM

## 2023-01-03 DIAGNOSIS — Z90.89 ACQUIRED ABSENCE OF OTHER ORGANS: Chronic | ICD-10-CM

## 2023-01-03 DIAGNOSIS — Z90.49 ACQUIRED ABSENCE OF OTHER SPECIFIED PARTS OF DIGESTIVE TRACT: Chronic | ICD-10-CM

## 2023-01-03 PROCEDURE — 51700 IRRIGATION OF BLADDER: CPT

## 2023-01-03 NOTE — ASSESSMENT
[FreeTextEntry1] : Bladder irrigated to clear, no residual clots in the bladder.\par Catheter removed and discharged home.\par Urine sent for culture.

## 2023-01-03 NOTE — HISTORY OF PRESENT ILLNESS
[FreeTextEntry1] : s/p Prostate biopsy last week with Dr. Negro.\par Developed intermittent hematuria. Last night hematuria worsened, with passage of clots.\par No fever/chills, flank pain.  Minimal dysuria.

## 2023-01-04 DIAGNOSIS — R31.0 GROSS HEMATURIA: ICD-10-CM

## 2023-01-04 LAB — BACTERIA UR CULT: NORMAL

## 2023-01-09 LAB — PROSTATE BIOPSY: NORMAL

## 2023-01-11 ENCOUNTER — APPOINTMENT (OUTPATIENT)
Dept: UROLOGY | Facility: CLINIC | Age: 68
End: 2023-01-11
Payer: MEDICARE

## 2023-01-11 PROCEDURE — 99215 OFFICE O/P EST HI 40 MIN: CPT

## 2023-01-12 NOTE — HISTORY OF PRESENT ILLNESS
[FreeTextEntry1] : 01/11/2023: Mr. PIERSON is a 67 year old male presenting today for consideration of curative therapy for prostate cancer. 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 He is accompanied today by his partner of 15 years who worked as an . I informed the patient that I am not comfortable with Active Surveillance because his PSA of 11.50 is higher than expected with the [place] score and the number of positive cores found. I explained to him that I am concerned that we missed some  prostate cancer, or that we missed a more aggressive prostate cancer that is pouring that much PSA. Fhx of prostate cancer (his father of [place] currently does). He reports with the rise of PSA over the last couple of the years, his urinary flow and his erectile functions have worsened. His AUA score is 23/35. Hx of ruptured appendix. He provided the operative report. I explained to the patient that due to his hx of ruptured appendix, a radical prostatectomy would not be performed normally because we want to avoid going through the abdomen. It would be performed via transvesical radical prostatectomy which would mean a limited dissection of the lymph nodes because of limited access to the lymph nodes. His chance of having lymph node involvement is 4/100. The other method would be preperitoneal radical prostatectomy. I explained at length the difference between the three methods and the risks and benefits. Due to his very low chance of node involvement, I personally expressed my preference for transvesical radical prostatectomy. \par \par -PSA: 11.50 ng/mL on 12/01/2022\par -Clinical Stage T2a\par -[place] score 7 (3+4); 3/15 Biopsy on 12/28/2022\par    Prostate Volume: 51 mL; PSA density: 0.23 ng/mL/mL\par -Prostate MRI on 12/07/2022 showed "PI-RADS Assessment Category: 4, High\par    Neurovascular bundle: No evidence of neurovascular bundle invasion.\par    Seminal vesicles: No seminal vesicle invasion.\par    Lymph nodes: No pelvic adenopathy.\par    Bones: No suspicious lesions identified."\par \par Assessment:  Low intermiediate risk prostate cancer in a patient with a significant intra-abdominal surgical history with ruptured appendix and multiple lysis of adhesions who continues to have some abdominal distention intermittently.\par \par \par All treatment options were reviewed. This included active surveillance, androgen deprivation, emerging options such as focal therapy/HIFU/cryotherapy, radiation options (including IMRT, SBRT, brachytherapy) and surgical options ( open vs. robotic, nerve vs non-nerve sparing.) Oncologic outcomes were compared and contrasted. Risks of biochemical and clinical recurrence discussed. Risks of needing adjuvant or salvage treatment reviewed. We discussed the the risks of secondary malignancy after radiation. We discussed the opportunity for pathological staging with survey vs other options. We discussed the possibility of positive margins, treatment failure, cancer recurrence and cancer-related death even with treatment. \par \par All potential side effects of treatment were reviewed including, but not limited to: short term or permanent stress urinary incontinence and/or short term or permanent erectile dysfunction, penile shortening, rectal symptoms/pain, perineal pain, and other side effects. \par \par All potential complications of treatment and surgery were reviewed including, but not limited to: risks of conversion from MIS to open surgery discussed, bleeding/life-threatening hemorrhage, rectal injury requiring colostomy or delayed recognition leading to fistula, vascular/bowel/adjacent visceral organ injury, trocar/access injury, the possibility of recognized vs. unrecognized/delayed- recognition injury, risks of thermal/blunt/sharp/retraction injury, risks of DVT, PE, MI, death risks of cardiopulmonary/anesthesia related complications, positional injury, infection/collection/abscess, wound complications/dehiscence/seroma/cellulitis, urinoma/fistula, uretal injury/obstruction, bladder neck contracture, penile shortening, meatal stenosis, urethral stricture, lymphocele, obstructor nerve injury, prolonged anastomotic leak, and other complications.\par \par Plan: P he wants to have a second opinion and has scheduled this at [facility].  We are offering the patient a retroperitoneal or transvesical approach for his prostate removal he has a low likelihood of lymph node involvement would be a good candidate for preperitoneal or transvesical radical prostatectomy I discussed at length the risks and benefits of both approaches.  We have also discussed the possibility of with a preperitoneal approach that that space has been disrupted by his prior surgeries.  Patient and his partner seemed understand and want to proceed.\par \par I counseled the patient. I discussed the various etiologies of his symptoms. Risks and alternatives were discussed. I answered the patient questions. The patient will follow-up as directed and will contact me with any questions or concerns. Thank you for the opportunity to participate in the care of this patient. I'll keep you updated on his progress.

## 2023-01-12 NOTE — DISEASE MANAGEMENT
[2] : T2 [a] : a [10-20] : 10 - 20 ng/mL [Biopsy] : Patient had a biopsy on [7(3+4)] : Template Biopsy [place] Score: 7(3+4) [BiopsyDate] : 12/2022 [MeasuredProstateVolume] : 51 mL [TotalCores] : 13 [TotalPositiveCores] : 3 [MaxCoreInvolvement] : 3/15 [IIA] : IIA

## 2023-01-25 ENCOUNTER — APPOINTMENT (OUTPATIENT)
Dept: FAMILY MEDICINE | Facility: CLINIC | Age: 68
End: 2023-01-25
Payer: MEDICARE

## 2023-01-25 VITALS
TEMPERATURE: 97.9 F | DIASTOLIC BLOOD PRESSURE: 60 MMHG | HEART RATE: 65 BPM | WEIGHT: 184 LBS | SYSTOLIC BLOOD PRESSURE: 110 MMHG | BODY MASS INDEX: 27.25 KG/M2 | OXYGEN SATURATION: 96 % | HEIGHT: 69 IN

## 2023-01-25 DIAGNOSIS — R97.20 ELEVATED PROSTATE, SPECIFIC ANTIGEN [PSA]: ICD-10-CM

## 2023-01-25 PROCEDURE — 99214 OFFICE O/P EST MOD 30 MIN: CPT

## 2023-01-25 NOTE — HISTORY OF PRESENT ILLNESS
[No Pertinent Cardiac History] : no history of aortic stenosis, atrial fibrillation, coronary artery disease, recent myocardial infarction, or implantable device/pacemaker [No Pertinent Pulmonary History] : no history of asthma, COPD, sleep apnea, or smoking [No Adverse Anesthesia Reaction] : no adverse anesthesia reaction in self or family member [Chronic Anticoagulation] : no chronic anticoagulation [Chronic Kidney Disease] : no chronic kidney disease [Diabetes] : no diabetes [(Patient denies any chest pain, claudication, dyspnea on exertion, orthopnea, palpitations or syncope)] : Patient denies any chest pain, claudication, dyspnea on exertion, orthopnea, palpitations or syncope [FreeTextEntry1] : removal of prostate [FreeTextEntry2] : 02/01/2023 [FreeTextEntry3] : Dr. Ronen Rand [FreeTextEntry5] : hx of TIA 4/2021 [FreeTextEntry4] : ALYCIA PIERSON is a 67 year old male with PMH of HTN, TIA presenting for medical clearance.

## 2023-01-25 NOTE — CONSULT LETTER
[Dear  ___] : Dear ~WANDA, [( Thank you for referring [unfilled] for consultation for _____ )] : Thank you for referring [unfilled] for consultation for [unfilled] [Please see my note below.] : Please see my note below. [Consult Closing:] : Thank you very much for allowing me to participate in the care of this patient.  If you have any questions, please do not hesitate to contact me. [Sincerely,] : Sincerely, [FreeTextEntry3] : Frank Calzada DO\par Diplomate of [employer]\par  at [employer] at [facility]\par

## 2023-01-25 NOTE — ASSESSMENT
[Patient Optimized for Surgery] : Patient optimized for surgery [Cardiology consultation] : Cardiology consultation

## 2023-01-25 NOTE — PLAN
[FreeTextEntry1] : -optimized pending cardiac clearance. \par -patient scheduled for cardiac clearance tomorrow \par -Patient advised to remain NPO after midnight prior to surgery. \par -Risks/benefits of surgery have been discussed by operative physician. \par -Patient has been advised to avoid aspirin or aspirin containing products from now until surgery. \par -Thank you for including me in the care of your patient.\par

## 2023-02-08 ENCOUNTER — FORM ENCOUNTER (OUTPATIENT)
Age: 68
End: 2023-02-08

## 2023-02-13 ENCOUNTER — NON-APPOINTMENT (OUTPATIENT)
Age: 68
End: 2023-02-13

## 2023-02-22 ENCOUNTER — NON-APPOINTMENT (OUTPATIENT)
Age: 68
End: 2023-02-22

## 2023-02-22 NOTE — PATIENT PROFILE ADULT - FLU SEASON?
Quality 130: Documentation Of Current Medications In The Medical Record: Current Medications Documented
Detail Level: Detailed
No
Quality 431: Preventive Care And Screening: Unhealthy Alcohol Use - Screening: Patient not identified as an unhealthy alcohol user when screened for unhealthy alcohol use using a systematic screening method
Quality 226: Preventive Care And Screening: Tobacco Use: Screening And Cessation Intervention: Patient screened for tobacco use and is an ex/non-smoker

## 2023-03-03 ENCOUNTER — APPOINTMENT (OUTPATIENT)
Dept: COLORECTAL SURGERY | Facility: CLINIC | Age: 68
End: 2023-03-03
Payer: MEDICARE

## 2023-03-03 VITALS
SYSTOLIC BLOOD PRESSURE: 118 MMHG | BODY MASS INDEX: 26.07 KG/M2 | HEART RATE: 96 BPM | OXYGEN SATURATION: 98 % | RESPIRATION RATE: 16 BRPM | HEIGHT: 69 IN | WEIGHT: 176 LBS | DIASTOLIC BLOOD PRESSURE: 72 MMHG | TEMPERATURE: 97 F

## 2023-03-03 PROCEDURE — 99214 OFFICE O/P EST MOD 30 MIN: CPT

## 2023-03-03 NOTE — ASSESSMENT
[FreeTextEntry1] : 67-year-old male with new right sided spigelian hernia most likely related to recent robotic prostate surgery and port site.  Recommend laparoscopic possible open repair spigelian hernia with mesh.  Risk and benefits of the surgery have been discussed which include bleeding, infection, sepsis, multiorgan failure, inadvertent injury including hollow viscus, solid organ, ureter neurovascular and neurological structures, DVT PE, heart attack, stroke, hernias, recurrence, and death.

## 2023-03-03 NOTE — PHYSICAL EXAM
[Abdomen Masses] : No abdominal masses [Abdomen Tenderness] : ~T ~M Abdominal tenderness [Normal Breath Sounds] : Normal breath sounds [Normal Heart Sounds] : normal heart sounds [No Rash or Lesion] : No rash or lesion [Alert] : alert [Oriented to Person] : oriented to person [Oriented to Place] : oriented to place [Oriented to Time] : oriented to time [Calm] : calm [de-identified] : Right spigelian hernia [de-identified] : Looks well in no distress, of stated age. [de-identified] : Pupils equal reactive to light normocephalic atraumatic.

## 2023-03-03 NOTE — HISTORY OF PRESENT ILLNESS
[FreeTextEntry1] : Initial office visit for this 67-year-old male recently underwent robotic prostatectomy for prostate cancer now has a right-sided spigelian hernia most likely had a previous port site

## 2023-03-27 ENCOUNTER — FORM ENCOUNTER (OUTPATIENT)
Age: 68
End: 2023-03-27

## 2023-04-17 ENCOUNTER — APPOINTMENT (OUTPATIENT)
Dept: FAMILY MEDICINE | Facility: CLINIC | Age: 68
End: 2023-04-17
Payer: MEDICARE

## 2023-04-17 ENCOUNTER — NON-APPOINTMENT (OUTPATIENT)
Age: 68
End: 2023-04-17

## 2023-04-17 VITALS
SYSTOLIC BLOOD PRESSURE: 122 MMHG | WEIGHT: 176 LBS | BODY MASS INDEX: 26.07 KG/M2 | HEIGHT: 69 IN | TEMPERATURE: 97 F | HEART RATE: 74 BPM | OXYGEN SATURATION: 98 % | DIASTOLIC BLOOD PRESSURE: 82 MMHG

## 2023-04-17 DIAGNOSIS — K43.9 VENTRAL HERNIA W/OUT OBSTRUCTION OR GANGRENE: ICD-10-CM

## 2023-04-17 PROCEDURE — 99213 OFFICE O/P EST LOW 20 MIN: CPT

## 2023-04-17 NOTE — REVIEW OF SYSTEMS
[Hesitancy] : hesitancy [Impotence] : impotence [Fever] : no fever [Chills] : no chills [Fatigue] : no fatigue [Vision Problems] : no vision problems [Hearing Loss] : no hearing loss [Chest Pain] : no chest pain [Palpitations] : no palpitations [Shortness Of Breath] : no shortness of breath [Cough] : no cough [Dyspnea on Exertion] : not dyspnea on exertion [Dysuria] : no dysuria [Hematuria] : no hematuria [Joint Pain] : no joint pain [Back Pain] : no back pain [Anxiety] : no anxiety [Depression] : no depression [FreeTextEntry7] : see HPI [de-identified] : see HPI

## 2023-04-17 NOTE — PHYSICAL EXAM
[Fundoscopic Exam Performed] : fundoscopic ~T exam ~C was performed [No Edema] : there was no peripheral edema [Soft] : abdomen soft [Non Tender] : non-tender [Grossly Normal Strength/Tone] : grossly normal strength/tone [No Rash] : no rash [Coordination Grossly Intact] : coordination grossly intact [No Focal Deficits] : no focal deficits [Normal Gait] : normal gait [Normal] : affect was normal and insight and judgment were intact [de-identified] : multiple scars from previous surgeries, 1 cm firm nodule overlying scar from recent prostatectomy

## 2023-04-17 NOTE — HISTORY OF PRESENT ILLNESS
[Spouse] : spouse [FreeTextEntry8] : Patient presents with right lower quadrant abdominal pain. He also had a robotic radical prostatectomy for prostate cancer at [facility] in 2/2023 and developed pain afterwards. The surgeon at [facility] ordered an abdominal CT scan which reportedly showed a spigelian hernia. He saw Dr. Dawson for a second opinion and he did not see a hernia on the CT. The patient then had the CT reviewed by several other radiologists who also did not see the hernia. \par \par He went back to the surgeon at [facility] who was still suggesting surgery until the patient's wife reminded him that the patient's original prostate surgery took twice as long as planned so she was concerned about his surgical risk. At this point the surgeon told him to wait for 6 weeks to see if the pain improved or worsened. He also told the patient to resume his normal activities. He was feeling well until last night when his pain returned and he noticed a small bulge right over the incision line from the surgery. This is in a more medial location than his original pain.

## 2023-04-17 NOTE — PLAN
[FreeTextEntry1] : Will pursue watchful waiting for now. If he develops worsening pain or change in bowel habits he will follow up in the office or go to the ER for another CT scan. Otherwise he will keep his appointment with Dr. Dawson on Friday and will await his opinion.

## 2023-04-17 NOTE — HEALTH RISK ASSESSMENT
[No falls in past year] : Patient reported no falls in the past year [0] : 2) Feeling down, depressed, or hopeless: Not at all (0) [PHQ-2 Negative - No further assessment needed] : PHQ-2 Negative - No further assessment needed [Former] : Former [15-19] : 15-19 [> 15 Years] : > 15 Years [[id number]] : 0

## 2023-04-17 NOTE — ASSESSMENT
[FreeTextEntry1] : He was diagnosed with a spigelian hernia by the surgeon at [facility] but no other doctors have confirmed that there is a hernia. He has no clear hernia on exam. The firm nodule under his healed incision is more consistent with a lymph node, scar tissue, or even a suture.\par \par He is scheduled to see Dr. Dawson again on Friday. He is also scheduled to see the doctors at [facility] on 5/11.

## 2023-04-20 ENCOUNTER — APPOINTMENT (OUTPATIENT)
Dept: FAMILY MEDICINE | Facility: CLINIC | Age: 68
End: 2023-04-20
Payer: MEDICARE

## 2023-04-20 VITALS
WEIGHT: 180 LBS | HEART RATE: 91 BPM | SYSTOLIC BLOOD PRESSURE: 130 MMHG | DIASTOLIC BLOOD PRESSURE: 80 MMHG | OXYGEN SATURATION: 96 % | BODY MASS INDEX: 26.66 KG/M2 | HEIGHT: 69 IN | TEMPERATURE: 99.9 F

## 2023-04-20 DIAGNOSIS — R68.89 OTHER GENERAL SYMPTOMS AND SIGNS: ICD-10-CM

## 2023-04-20 LAB
FLUAV SPEC QL CULT: NEGATIVE
FLUBV AG SPEC QL IA: NEGATIVE

## 2023-04-20 PROCEDURE — 87804 INFLUENZA ASSAY W/OPTIC: CPT | Mod: QW

## 2023-04-20 PROCEDURE — 99213 OFFICE O/P EST LOW 20 MIN: CPT | Mod: 25

## 2023-04-20 RX ORDER — DILTIAZEM HYDROCHLORIDE 120 MG/1
120 CAPSULE, EXTENDED RELEASE ORAL
Refills: 0 | Status: DISCONTINUED | COMMUNITY
End: 2023-04-20

## 2023-04-20 NOTE — REVIEW OF SYSTEMS
[Fever] : fever [Chills] : chills [Postnasal Drip] : postnasal drip [Nasal Discharge] : nasal discharge [Sore Throat] : sore throat [Cough] : cough [Nausea] : nausea [Vomiting] : vomiting [Negative] : Heme/Lymph [Shortness Of Breath] : no shortness of breath [Wheezing] : no wheezing [Abdominal Pain] : no abdominal pain [Diarrhea] : no diarrhea

## 2023-04-20 NOTE — PHYSICAL EXAM
[Normal Outer Ear/Nose] : the outer ears and nose were normal in appearance [Normal TMs] : both tympanic membranes were normal [No Edema] : there was no peripheral edema [Normal] : no rash [Coordination Grossly Intact] : coordination grossly intact [No Focal Deficits] : no focal deficits [Normal Gait] : normal gait [Normal Affect] : the affect was normal [Normal Insight/Judgement] : insight and judgment were intact [de-identified] : appears sick but non-toxic [de-identified] : nasal mucosa edematous/erythematous with clear drainage; PND, mild erythema, no exudates. [de-identified] : multiple scars from previous surgeries, 1 cm firm nodule overlying scar from recent prostatectomy

## 2023-04-20 NOTE — HISTORY OF PRESENT ILLNESS
[FreeTextEntry8] : Pt is a 66yo male who presents to the office complaining of flu like illness.\par Pt reports fever, Tmax 100F, vomiting, cough, NC that came on quickly last night.\par Pt reports associated chills, body aches.\par Has not vomited today, tolerated PO this morning.\par Denies known sick contacts.\par Pt received flu shot and COVID booster in 09/2022.

## 2023-04-20 NOTE — ASSESSMENT
[FreeTextEntry1] : Patient is a 66yo male who presents to the office complaining of flu like illness.  Started last night.  Tmax 100F at home.  Has cough, vomited last night but no vomiting today and tolerating PO.  Pt seen at our office 4/17/23 for hernia evaluation.  Pt's exam stable/consistent with previous from 4/17/23.  No clear hernia on exam.  I do not believe his current symptoms are associated with his hernia.  \par \par Rapid flu in office negative.\par Respiratory panel sent to lab.\par Supportive care discussed, including Acetaminophen for fever, increase fluid intake, BRAT diet, advance as tolerated, etc.\par Call the office or go to the ED immediately if you develop new, worsening or concerning symptoms including high fever, severe headache/worst headache of your life, confusion, dizziness/lightheadedness, loss of consciousness, severe chest pain, difficulty breathing, shortness of breath, severe abdominal pain, excessive vomiting/diarrhea, inability to feel/move the extremities, or any other concerning symptoms.\par

## 2023-04-21 ENCOUNTER — NON-APPOINTMENT (OUTPATIENT)
Age: 68
End: 2023-04-21

## 2023-04-21 LAB
RAPID RVP RESULT: DETECTED
SARS-COV-2 RNA PNL RESP NAA+PROBE: DETECTED

## 2023-04-25 DIAGNOSIS — R05.9 COUGH, UNSPECIFIED: ICD-10-CM

## 2023-04-28 ENCOUNTER — APPOINTMENT (OUTPATIENT)
Dept: FAMILY MEDICINE | Facility: CLINIC | Age: 68
End: 2023-04-28
Payer: MEDICARE

## 2023-04-28 ENCOUNTER — NON-APPOINTMENT (OUTPATIENT)
Age: 68
End: 2023-04-28

## 2023-04-28 VITALS
TEMPERATURE: 96.4 F | SYSTOLIC BLOOD PRESSURE: 120 MMHG | WEIGHT: 180 LBS | HEIGHT: 69 IN | DIASTOLIC BLOOD PRESSURE: 84 MMHG | HEART RATE: 88 BPM | BODY MASS INDEX: 26.66 KG/M2 | OXYGEN SATURATION: 96 %

## 2023-04-28 DIAGNOSIS — J04.0 ACUTE LARYNGITIS: ICD-10-CM

## 2023-04-28 DIAGNOSIS — J30.9 ALLERGIC RHINITIS, UNSPECIFIED: ICD-10-CM

## 2023-04-28 DIAGNOSIS — U07.1 COVID-19: ICD-10-CM

## 2023-04-28 PROCEDURE — 99213 OFFICE O/P EST LOW 20 MIN: CPT | Mod: CS

## 2023-04-28 NOTE — ASSESSMENT
[FreeTextEntry1] : Patient is a 68yo male who presents to the office complaining of laryngitis, cough s/p COVID diagnosis on 4/20/23.  Pt states cough has improved with Albuterol, OTC cough suppressants.  Now with laryngitis as well as seasonal allergy exacerbation with red/itchy/tearing eyes.  Lungs CTA in all lung fields with good air movement, normal pulse ox.  Encouraged to d/c Albuterol, use only PRN wheezing/tightness/SOB.  Use Tessalon PRN and OTC cough suppressants/decongestants PRN.  Recommended Coricidin given pt's heart history.  Also recommended continue Zyrtec and add Flonase for allergies, PND.\par \par Call the office or go to the ED immediately if you develop new, worsening or concerning symptoms including high fever, severe headache/worst headache of your life, confusion, dizziness/lightheadedness, loss of consciousness, severe chest pain, difficulty breathing, shortness of breath, severe abdominal pain, excessive vomiting/diarrhea, inability to feel/move the extremities, or any other concerning symptoms.\par

## 2023-04-28 NOTE — PHYSICAL EXAM
[Normal Outer Ear/Nose] : the outer ears and nose were normal in appearance [No Edema] : there was no peripheral edema [Normal] : no rash [Coordination Grossly Intact] : coordination grossly intact [No Focal Deficits] : no focal deficits [Normal Gait] : normal gait [Normal Affect] : the affect was normal [Normal Insight/Judgement] : insight and judgment were intact [de-identified] : mild erythematous injection without significant drainage bilaterally [de-identified] : effusions bilateral TMs; nasal mucosa edematous/erythematous with clear drainage; PND, mild erythema, no exudates.

## 2023-04-28 NOTE — HISTORY OF PRESENT ILLNESS
[FreeTextEntry8] : \rosa elena Pt diagnosed with COVID 4/20/2023.\rosa elena Pt has persistent laryngitis, cough.\rosa elena States cough has improved since initial COVID diagnosis but persisted.\rosa elena Cough is occasionally productive of cloudy sputum.\rosa elena Feels his allergies are bothering him as well, has itchy/runny eyes.\rosa elena Has been using Albuterol inhaler which has helped cough.\rosa elena Pt has been taking Robitussin, Tessalon, Zyrtec.\rosa elena Has not had fever x1 week.

## 2023-04-28 NOTE — REVIEW OF SYSTEMS
[Discharge] : no discharge [Pain] : no pain [Redness] : redness [Vision Problems] : no vision problems [Itching] : itching [Postnasal Drip] : postnasal drip [Nasal Discharge] : nasal discharge [Sore Throat] : sore throat [Hoarseness] : hoarseness [Shortness Of Breath] : no shortness of breath [Wheezing] : no wheezing [Cough] : cough [Negative] : Heme/Lymph

## 2023-05-04 ENCOUNTER — APPOINTMENT (OUTPATIENT)
Dept: FAMILY MEDICINE | Facility: CLINIC | Age: 68
End: 2023-05-04
Payer: MEDICARE

## 2023-05-04 VITALS
TEMPERATURE: 97.2 F | OXYGEN SATURATION: 97 % | WEIGHT: 183 LBS | DIASTOLIC BLOOD PRESSURE: 72 MMHG | HEART RATE: 65 BPM | SYSTOLIC BLOOD PRESSURE: 122 MMHG | BODY MASS INDEX: 27.11 KG/M2 | HEIGHT: 69 IN

## 2023-05-04 DIAGNOSIS — Z00.00 ENCOUNTER FOR GENERAL ADULT MEDICAL EXAMINATION W/OUT ABNORMAL FINDINGS: ICD-10-CM

## 2023-05-04 DIAGNOSIS — N40.1 BENIGN PROSTATIC HYPERPLASIA WITH LOWER URINARY TRACT SYMPMS: ICD-10-CM

## 2023-05-04 DIAGNOSIS — N13.8 BENIGN PROSTATIC HYPERPLASIA WITH LOWER URINARY TRACT SYMPMS: ICD-10-CM

## 2023-05-04 DIAGNOSIS — F32.A DEPRESSION, UNSPECIFIED: ICD-10-CM

## 2023-05-04 PROCEDURE — G0439: CPT

## 2023-05-04 PROCEDURE — 36415 COLL VENOUS BLD VENIPUNCTURE: CPT

## 2023-05-04 NOTE — PHYSICAL EXAM
[No Acute Distress] : no acute distress [Well Nourished] : well nourished [Well Developed] : well developed [Well-Appearing] : well-appearing [Normal Sclera/Conjunctiva] : normal sclera/conjunctiva [PERRL] : pupils equal round and reactive to light [EOMI] : extraocular movements intact [Normal Outer Ear/Nose] : the outer ears and nose were normal in appearance [Normal Oropharynx] : the oropharynx was normal [No JVD] : no jugular venous distention [No Lymphadenopathy] : no lymphadenopathy [Supple] : supple [Thyroid Normal, No Nodules] : the thyroid was normal and there were no nodules present [No Respiratory Distress] : no respiratory distress  [No Accessory Muscle Use] : no accessory muscle use [Clear to Auscultation] : lungs were clear to auscultation bilaterally [Normal Rate] : normal rate  [Regular Rhythm] : with a regular rhythm [Normal S1, S2] : normal S1 and S2 [No Murmur] : no murmur heard [No Carotid Bruits] : no carotid bruits [No Varicosities] : no varicosities [Pedal Pulses Present] : the pedal pulses are present [No Edema] : there was no peripheral edema [No Extremity Clubbing/Cyanosis] : no extremity clubbing/cyanosis [Non Tender] : non-tender [Soft] : abdomen soft [Non-distended] : non-distended [No Masses] : no abdominal mass palpated [No HSM] : no HSM [Normal Bowel Sounds] : normal bowel sounds [Normal Posterior Cervical Nodes] : no posterior cervical lymphadenopathy [Normal Anterior Cervical Nodes] : no anterior cervical lymphadenopathy [No CVA Tenderness] : no CVA  tenderness [No Spinal Tenderness] : no spinal tenderness [No Joint Swelling] : no joint swelling [Grossly Normal Strength/Tone] : grossly normal strength/tone [No Rash] : no rash [Coordination Grossly Intact] : coordination grossly intact [No Focal Deficits] : no focal deficits [Normal Gait] : normal gait [Normal Affect] : the affect was normal [Normal Insight/Judgement] : insight and judgment were intact

## 2023-05-04 NOTE — HEALTH RISK ASSESSMENT
[No falls in past year] : Patient reported no falls in the past year [0] : 2) Feeling down, depressed, or hopeless: Not at all (0) [PHQ-2 Negative - No further assessment needed] : PHQ-2 Negative - No further assessment needed [Former] : Former [> 15 Years] : > 15 Years [5-9] : 5-9 [VAL2Oissa] : 0

## 2023-05-04 NOTE — HISTORY OF PRESENT ILLNESS
[FreeTextEntry1] : ALYCIA PIERSON is a 67 year old male here for a physical exam.  [de-identified] : His last physical exam was 9/2008 in our office (he changed doctors due to insurance)\par \par Vaccines:\par Tetanus is up to date per patient (Summer 2022)\par Pneumococcal vaccination is NOT up to date\par Shingrix is NOT up to date\par COVID vaccine is up to date\par \par His last dentist visit was less than one year ago\par His last eye doctor appointment was less than one year ago\par His last dermatologist visit was less than one year ago\par \par Colon cancer screening is up to date; colonoscopy 1/7/2021, 5-10yr f/u recommended (Dr. Steel)\par \par His diet is healthy overall\par Exercise: kayaking and rock climbing

## 2023-05-04 NOTE — ASSESSMENT
[FreeTextEntry1] : ALYCIA PIERSON is a 67 year old male here for a physical exam.\par \par He has a history of BPH, depression, hypertension, hypercholesterolemia, and prostate cancer. He had a robotic radical prostatectomy in 2/2023. He began to have abdominal pain afterwards and there was a question if he had a spigelian hernia. He has a history of small bowel obstructions related to previous abdominal surgery. He is seeing his surgeon again in 5/11.\par \par He sees a cardiologist regularly and does not need an EKG today. \par \par He had an acute visit last week for a persistent cough after COVID (diagnosed 4/20/23). He has laryngitis but is slowly improving.\par

## 2023-05-04 NOTE — PLAN
[FreeTextEntry1] : Continue all medications as prescribed. Check labs as above. Will adjust any medications based upon lab results.\par \par Reviewed age-appropriate preventive screening tests with patient. He is due for Prevnar 20 and Shingrix. He would like to wait until he is feeling better to get any vaccines. Recommended he get the Shingrix vaccines at the pharmacy since these should be covered as part of the Medicare prescription plan. \par \par Discussed clean eating (eg Mediterranean style eating plan) and regular exercise/staying as physically active as possible. Include balance exercises and strength training and core strengthening exercises for bone health and to decrease risk for falls.\par \par Reviewed importance of good self care (e.g. meditation, yoga, adequate rest, regular exercise, magnesium, clean eating, etc.).\par \par Follow up for next physical in one year.\par

## 2023-05-04 NOTE — REVIEW OF SYSTEMS
[Abdominal Pain] : abdominal pain [Hesitancy] : hesitancy [Impotence] : impotence [Fever] : no fever [Chills] : no chills [Fatigue] : no fatigue [Vision Problems] : no vision problems [Hearing Loss] : no hearing loss [Chest Pain] : no chest pain [Palpitations] : no palpitations [Shortness Of Breath] : no shortness of breath [Cough] : no cough [Dyspnea on Exertion] : not dyspnea on exertion [Dysuria] : no dysuria [Hematuria] : no hematuria [Joint Pain] : no joint pain [Back Pain] : no back pain [Dizziness] : no dizziness [Memory Loss] : no memory loss [Anxiety] : no anxiety [Depression] : no depression

## 2023-05-06 ENCOUNTER — TRANSCRIPTION ENCOUNTER (OUTPATIENT)
Age: 68
End: 2023-05-06

## 2023-05-06 LAB
ALBUMIN SERPL ELPH-MCNC: 4.5 G/DL
ALP BLD-CCNC: 78 U/L
ALT SERPL-CCNC: 20 U/L
ANION GAP SERPL CALC-SCNC: 11 MMOL/L
AST SERPL-CCNC: 23 U/L
BASOPHILS # BLD AUTO: 0.02 K/UL
BASOPHILS NFR BLD AUTO: 0.3 %
BILIRUB SERPL-MCNC: 0.4 MG/DL
BUN SERPL-MCNC: 13 MG/DL
CALCIUM SERPL-MCNC: 9.8 MG/DL
CHLORIDE SERPL-SCNC: 106 MMOL/L
CHOLEST SERPL-MCNC: 169 MG/DL
CO2 SERPL-SCNC: 26 MMOL/L
CREAT SERPL-MCNC: 0.9 MG/DL
EGFR: 94 ML/MIN/1.73M2
EOSINOPHIL # BLD AUTO: 0.11 K/UL
EOSINOPHIL NFR BLD AUTO: 1.6 %
GLUCOSE SERPL-MCNC: 86 MG/DL
HCT VFR BLD CALC: 46.8 %
HDLC SERPL-MCNC: 59 MG/DL
HGB BLD-MCNC: 15.6 G/DL
IMM GRANULOCYTES NFR BLD AUTO: 0.3 %
LDLC SERPL CALC-MCNC: 80 MG/DL
LYMPHOCYTES # BLD AUTO: 2.15 K/UL
LYMPHOCYTES NFR BLD AUTO: 32.1 %
MAN DIFF?: NORMAL
MCHC RBC-ENTMCNC: 30.4 PG
MCHC RBC-ENTMCNC: 33.3 GM/DL
MCV RBC AUTO: 91.2 FL
MONOCYTES # BLD AUTO: 0.63 K/UL
MONOCYTES NFR BLD AUTO: 9.4 %
NEUTROPHILS # BLD AUTO: 3.77 K/UL
NEUTROPHILS NFR BLD AUTO: 56.3 %
NONHDLC SERPL-MCNC: 110 MG/DL
PLATELET # BLD AUTO: 348 K/UL
POTASSIUM SERPL-SCNC: 4.4 MMOL/L
PROT SERPL-MCNC: 7.3 G/DL
RBC # BLD: 5.13 M/UL
RBC # FLD: 12.5 %
SODIUM SERPL-SCNC: 143 MMOL/L
TRIGL SERPL-MCNC: 148 MG/DL
WBC # FLD AUTO: 6.7 K/UL

## 2023-06-19 ENCOUNTER — TRANSCRIPTION ENCOUNTER (OUTPATIENT)
Age: 68
End: 2023-06-19

## 2023-07-21 ENCOUNTER — OUTPATIENT (OUTPATIENT)
Dept: EMERGENCY DEPT | Facility: HOSPITAL | Age: 68
LOS: 1 days | Discharge: HOME | End: 2023-07-21
Payer: MEDICARE

## 2023-07-21 VITALS — WEIGHT: 182.1 LBS | HEIGHT: 69 IN

## 2023-07-21 DIAGNOSIS — K56.69 OTHER INTESTINAL OBSTRUCTION: Chronic | ICD-10-CM

## 2023-07-21 DIAGNOSIS — Z90.89 ACQUIRED ABSENCE OF OTHER ORGANS: Chronic | ICD-10-CM

## 2023-07-21 DIAGNOSIS — K35.2 ACUTE APPENDICITIS WITH GENERALIZED PERITONITIS: Chronic | ICD-10-CM

## 2023-07-21 DIAGNOSIS — R07.9 CHEST PAIN, UNSPECIFIED: ICD-10-CM

## 2023-07-21 DIAGNOSIS — Z98.890 OTHER SPECIFIED POSTPROCEDURAL STATES: Chronic | ICD-10-CM

## 2023-07-21 DIAGNOSIS — K66.0 PERITONEAL ADHESIONS (POSTPROCEDURAL) (POSTINFECTION): Chronic | ICD-10-CM

## 2023-07-21 DIAGNOSIS — Z90.49 ACQUIRED ABSENCE OF OTHER SPECIFIED PARTS OF DIGESTIVE TRACT: Chronic | ICD-10-CM

## 2023-07-21 LAB
ALBUMIN SERPL ELPH-MCNC: 3.9 G/DL — SIGNIFICANT CHANGE UP (ref 3.3–5)
ALP SERPL-CCNC: 78 U/L — SIGNIFICANT CHANGE UP (ref 40–120)
ALT FLD-CCNC: 33 U/L — SIGNIFICANT CHANGE UP (ref 12–78)
ANION GAP SERPL CALC-SCNC: 3 MMOL/L — LOW (ref 5–17)
APTT BLD: 34.5 SEC — SIGNIFICANT CHANGE UP (ref 27.5–35.5)
AST SERPL-CCNC: 21 U/L — SIGNIFICANT CHANGE UP (ref 15–37)
BASOPHILS # BLD AUTO: 0.02 K/UL — SIGNIFICANT CHANGE UP (ref 0–0.2)
BASOPHILS NFR BLD AUTO: 0.3 % — SIGNIFICANT CHANGE UP (ref 0–2)
BILIRUB SERPL-MCNC: 0.6 MG/DL — SIGNIFICANT CHANGE UP (ref 0.2–1.2)
BUN SERPL-MCNC: 15 MG/DL — SIGNIFICANT CHANGE UP (ref 7–23)
CALCIUM SERPL-MCNC: 9.1 MG/DL — SIGNIFICANT CHANGE UP (ref 8.5–10.1)
CHLORIDE SERPL-SCNC: 109 MMOL/L — HIGH (ref 96–108)
CO2 SERPL-SCNC: 28 MMOL/L — SIGNIFICANT CHANGE UP (ref 22–31)
CREAT SERPL-MCNC: 1.02 MG/DL — SIGNIFICANT CHANGE UP (ref 0.5–1.3)
EGFR: 81 ML/MIN/1.73M2 — SIGNIFICANT CHANGE UP
EOSINOPHIL # BLD AUTO: 0.07 K/UL — SIGNIFICANT CHANGE UP (ref 0–0.5)
EOSINOPHIL NFR BLD AUTO: 1.1 % — SIGNIFICANT CHANGE UP (ref 0–6)
GLUCOSE SERPL-MCNC: 97 MG/DL — SIGNIFICANT CHANGE UP (ref 70–99)
HCT VFR BLD CALC: 46.1 % — SIGNIFICANT CHANGE UP (ref 39–50)
HGB BLD-MCNC: 16 G/DL — SIGNIFICANT CHANGE UP (ref 13–17)
IMM GRANULOCYTES NFR BLD AUTO: 0.2 % — SIGNIFICANT CHANGE UP (ref 0–0.9)
INR BLD: 1.07 RATIO — SIGNIFICANT CHANGE UP (ref 0.88–1.16)
LYMPHOCYTES # BLD AUTO: 1.51 K/UL — SIGNIFICANT CHANGE UP (ref 1–3.3)
LYMPHOCYTES # BLD AUTO: 23 % — SIGNIFICANT CHANGE UP (ref 13–44)
MAGNESIUM SERPL-MCNC: 2.2 MG/DL — SIGNIFICANT CHANGE UP (ref 1.6–2.6)
MCHC RBC-ENTMCNC: 30.8 PG — SIGNIFICANT CHANGE UP (ref 27–34)
MCHC RBC-ENTMCNC: 34.7 GM/DL — SIGNIFICANT CHANGE UP (ref 32–36)
MCV RBC AUTO: 88.7 FL — SIGNIFICANT CHANGE UP (ref 80–100)
MONOCYTES # BLD AUTO: 0.86 K/UL — SIGNIFICANT CHANGE UP (ref 0–0.9)
MONOCYTES NFR BLD AUTO: 13.1 % — SIGNIFICANT CHANGE UP (ref 2–14)
NEUTROPHILS # BLD AUTO: 4.1 K/UL — SIGNIFICANT CHANGE UP (ref 1.8–7.4)
NEUTROPHILS NFR BLD AUTO: 62.3 % — SIGNIFICANT CHANGE UP (ref 43–77)
NRBC # BLD: 0 /100 WBCS — SIGNIFICANT CHANGE UP (ref 0–0)
NT-PROBNP SERPL-SCNC: 59 PG/ML — SIGNIFICANT CHANGE UP (ref 0–125)
PLATELET # BLD AUTO: 229 K/UL — SIGNIFICANT CHANGE UP (ref 150–400)
POTASSIUM SERPL-MCNC: 3.9 MMOL/L — SIGNIFICANT CHANGE UP (ref 3.5–5.3)
POTASSIUM SERPL-SCNC: 3.9 MMOL/L — SIGNIFICANT CHANGE UP (ref 3.5–5.3)
PROT SERPL-MCNC: 7.7 GM/DL — SIGNIFICANT CHANGE UP (ref 6–8.3)
PROTHROM AB SERPL-ACNC: 12.4 SEC — SIGNIFICANT CHANGE UP (ref 10.5–13.4)
RBC # BLD: 5.2 M/UL — SIGNIFICANT CHANGE UP (ref 4.2–5.8)
RBC # FLD: 13.7 % — SIGNIFICANT CHANGE UP (ref 10.3–14.5)
SODIUM SERPL-SCNC: 140 MMOL/L — SIGNIFICANT CHANGE UP (ref 135–145)
TROPONIN I, HIGH SENSITIVITY RESULT: 4.41 NG/L — SIGNIFICANT CHANGE UP
TROPONIN I, HIGH SENSITIVITY RESULT: 4.65 NG/L — SIGNIFICANT CHANGE UP
WBC # BLD: 6.57 K/UL — SIGNIFICANT CHANGE UP (ref 3.8–10.5)
WBC # FLD AUTO: 6.57 K/UL — SIGNIFICANT CHANGE UP (ref 3.8–10.5)

## 2023-07-21 PROCEDURE — 83735 ASSAY OF MAGNESIUM: CPT

## 2023-07-21 PROCEDURE — 85025 COMPLETE CBC W/AUTO DIFF WBC: CPT

## 2023-07-21 PROCEDURE — C1887: CPT

## 2023-07-21 PROCEDURE — 93005 ELECTROCARDIOGRAM TRACING: CPT | Mod: XU

## 2023-07-21 PROCEDURE — 36415 COLL VENOUS BLD VENIPUNCTURE: CPT

## 2023-07-21 PROCEDURE — 85730 THROMBOPLASTIN TIME PARTIAL: CPT

## 2023-07-21 PROCEDURE — 83880 ASSAY OF NATRIURETIC PEPTIDE: CPT

## 2023-07-21 PROCEDURE — 80053 COMPREHEN METABOLIC PANEL: CPT

## 2023-07-21 PROCEDURE — C1769: CPT

## 2023-07-21 PROCEDURE — 85610 PROTHROMBIN TIME: CPT

## 2023-07-21 PROCEDURE — 71045 X-RAY EXAM CHEST 1 VIEW: CPT

## 2023-07-21 PROCEDURE — 84484 ASSAY OF TROPONIN QUANT: CPT | Mod: 59

## 2023-07-21 RX ORDER — ASPIRIN/CALCIUM CARB/MAGNESIUM 324 MG
243 TABLET ORAL ONCE
Refills: 0 | Status: COMPLETED | OUTPATIENT
Start: 2023-07-21 | End: 2023-07-21

## 2023-07-21 RX ORDER — ACETAMINOPHEN 500 MG
650 TABLET ORAL EVERY 6 HOURS
Refills: 0 | Status: DISCONTINUED | OUTPATIENT
Start: 2023-07-21 | End: 2023-07-22

## 2023-07-21 RX ORDER — DOXAZOSIN MESYLATE 4 MG
1 TABLET ORAL
Qty: 0 | Refills: 0 | DISCHARGE

## 2023-07-21 RX ORDER — ONDANSETRON 8 MG/1
4 TABLET, FILM COATED ORAL EVERY 8 HOURS
Refills: 0 | Status: DISCONTINUED | OUTPATIENT
Start: 2023-07-21 | End: 2023-07-22

## 2023-07-21 RX ORDER — DOXAZOSIN MESYLATE 4 MG
1 TABLET ORAL DAILY
Refills: 0 | Status: DISCONTINUED | OUTPATIENT
Start: 2023-07-21 | End: 2023-07-22

## 2023-07-21 RX ORDER — AMLODIPINE BESYLATE 2.5 MG/1
5 TABLET ORAL AT BEDTIME
Refills: 0 | Status: DISCONTINUED | OUTPATIENT
Start: 2023-07-21 | End: 2023-07-22

## 2023-07-21 RX ORDER — LANOLIN ALCOHOL/MO/W.PET/CERES
3 CREAM (GRAM) TOPICAL AT BEDTIME
Refills: 0 | Status: DISCONTINUED | OUTPATIENT
Start: 2023-07-21 | End: 2023-07-22

## 2023-07-21 RX ORDER — ROSUVASTATIN CALCIUM 5 MG/1
1 TABLET ORAL
Qty: 0 | Refills: 1 | DISCHARGE

## 2023-07-21 RX ORDER — ASPIRIN/CALCIUM CARB/MAGNESIUM 324 MG
1 TABLET ORAL
Qty: 0 | Refills: 0 | DISCHARGE

## 2023-07-21 RX ORDER — BUPROPION HYDROCHLORIDE 150 MG/1
0 TABLET, EXTENDED RELEASE ORAL
Qty: 0 | Refills: 0 | DISCHARGE

## 2023-07-21 RX ORDER — ASPIRIN/CALCIUM CARB/MAGNESIUM 324 MG
81 TABLET ORAL DAILY
Refills: 0 | Status: DISCONTINUED | OUTPATIENT
Start: 2023-07-21 | End: 2023-07-22

## 2023-07-21 RX ORDER — SODIUM CHLORIDE 9 MG/ML
1000 INJECTION INTRAMUSCULAR; INTRAVENOUS; SUBCUTANEOUS
Refills: 0 | Status: DISCONTINUED | OUTPATIENT
Start: 2023-07-21 | End: 2023-07-22

## 2023-07-21 RX ORDER — DOXAZOSIN MESYLATE 4 MG
2 TABLET ORAL AT BEDTIME
Refills: 0 | Status: DISCONTINUED | OUTPATIENT
Start: 2023-07-21 | End: 2023-07-22

## 2023-07-21 RX ORDER — DOXAZOSIN MESYLATE 4 MG
3 TABLET ORAL
Qty: 0 | Refills: 0 | DISCHARGE

## 2023-07-21 RX ORDER — ATORVASTATIN CALCIUM 80 MG/1
20 TABLET, FILM COATED ORAL AT BEDTIME
Refills: 0 | Status: DISCONTINUED | OUTPATIENT
Start: 2023-07-21 | End: 2023-07-22

## 2023-07-21 RX ADMIN — Medication 243 MILLIGRAM(S): at 12:30

## 2023-07-21 RX ADMIN — SODIUM CHLORIDE 164 MILLILITER(S): 9 INJECTION INTRAMUSCULAR; INTRAVENOUS; SUBCUTANEOUS at 17:58

## 2023-07-21 RX ADMIN — SODIUM CHLORIDE 164 MILLILITER(S): 9 INJECTION INTRAMUSCULAR; INTRAVENOUS; SUBCUTANEOUS at 22:00

## 2023-07-21 RX ADMIN — Medication 2 MILLIGRAM(S): at 21:55

## 2023-07-21 RX ADMIN — ATORVASTATIN CALCIUM 20 MILLIGRAM(S): 80 TABLET, FILM COATED ORAL at 21:55

## 2023-07-21 NOTE — H&P ADULT - NSHPLABSRESULTS_GEN_ALL_CORE
16.0   6.57  )-----------( 229      ( 21 Jul 2023 12:23 )             46.1     07-21    140  |  109<H>  |  15  ----------------------------<  97  3.9   |  28  |  1.02    Ca    9.1      21 Jul 2023 12:23  Mg     2.2     07-21    TPro  7.7  /  Alb  3.9  /  TBili  0.6  /  DBili  x   /  AST  21  /  ALT  33  /  AlkPhos  78  07-21    CAPILLARY BLOOD GLUCOSE        PT/INR - ( 21 Jul 2023 12:23 )   PT: 12.4 sec;   INR: 1.07 ratio         PTT - ( 21 Jul 2023 12:23 )  PTT:34.5 sec  Urinalysis Basic - ( 21 Jul 2023 12:23 )    Color: x / Appearance: x / SG: x / pH: x  Gluc: 97 mg/dL / Ketone: x  / Bili: x / Urobili: x   Blood: x / Protein: x / Nitrite: x   Leuk Esterase: x / RBC: x / WBC x   Sq Epi: x / Non Sq Epi: x / Bacteria: x    EKG:  no ischemic changes.      Trop:  negatiev x 2.

## 2023-07-21 NOTE — CONSULT NOTE ADULT - ASSESSMENT
#Chest Pain R/o ACS.   Monitor on tele.  Plan for cardiac cath today. 68 y/o male with PMHx of HTN, HLD, depression, aortic aneurysm, GERD, TIA, multiple SBO in the past, PVCs who presented to  with CC of chest pain.    #Chest Pain R/o ACS.   Monitor on tele. No events thus far  Troponin negative x2, EKG no ischemic changes  Continue with aspirin, atorvastatin   Last stress test was 2016.    Plan for cardiac cath today with Dr. Buck  Check lipids and a1c  CXR Clear    #HTN. BP slightly elevated. Monitor, may need med adjustments.    #HLD. Continue atorvastatin. Check lipids.    #Aortic Aneurysm. Follows yearly at clinic. 4.1 cm.        Case d/w Dr. Corrigan and Dr. Buck.

## 2023-07-21 NOTE — ED ADULT TRIAGE NOTE - CHIEF COMPLAINT QUOTE
Pt presents to ED sent by cardiologist for intermittent chest tightness since last night. Denies shortness of breath and palpitations. Pmhx HTN

## 2023-07-21 NOTE — ED PROVIDER NOTE - NSICDXFAMILYHX_GEN_ALL_CORE_FT
FAMILY HISTORY:  Father  Still living? Unknown  Family history of hypertension, Age at diagnosis: Age Unknown  Family history of peripheral vascular disease, Age at diagnosis: Age Unknown  FH: HTN (hypertension), Age at diagnosis: Age Unknown    Mother  Still living? No  FH: CML (chronic myeloid leukemia), Age at diagnosis: Age Unknown  FH: HTN (hypertension), Age at diagnosis: Age Unknown

## 2023-07-21 NOTE — ED PROVIDER NOTE - NS ED ATTENDING STATEMENT MOD
This was a shared visit with the CAMMY. I reviewed and verified the documentation and independently performed the documented: Attending with

## 2023-07-21 NOTE — ED PROVIDER NOTE - CLINICAL SUMMARY MEDICAL DECISION MAKING FREE TEXT BOX
68 y/o male with CP. Will r/o ACS. Plan: screening EKG, chest XR, labs, reeval. 68 y/o male with CP. Will r/o ACS. Plan: screening EKG, chest XR, labs, reeval.      Chante CAVAZOS: endorsed to Dr. Mendes for admission.

## 2023-07-21 NOTE — ED PROVIDER NOTE - ATTENDING APP SHARED VISIT CONTRIBUTION OF CARE
I, Bernadette Sharif DO,  performed the initial face to face bedside interview with this patient regarding history of present illness, review of symptoms and relevant past medical, social and family history.  I completed an independent physical examination.  I was the initial provider who evaluated this patient.   I personally saw the patient and performed a substantive portion of the visit including all aspects of the medical decision making.  I have signed out the follow up of any pending tests (i.e. labs, radiological studies) to the ACP.  I have communicated the patient’s plan of care and disposition with the ACP.  The history, relevant review of systems, past medical and surgical history, medical decision making, and physical examination was documented by the scribe in my presence and I attest to the accuracy of the documentation.

## 2023-07-21 NOTE — ED ADULT TRIAGE NOTE - GLASGOW COMA SCALE: BEST VERBAL RESPONSE, MLM
for input(s): INR in the last 72 hours. Lipids: No results for input(s): CHOL, LDLDIRECT, TRIG, HDL, AMYLASE, LIPASE in the last 72 hours. Liver:   Recent Labs     10/05/22  1530   AST 57*   ALT 14   ALKPHOS 222*   PROT 7.4   LABALBU 3.4*   BILITOT 0.3     Iron:  No results for input(s): IRONS, FERRITIN in the last 72 hours. Invalid input(s): LABIRONS  Urinalysis: No results for input(s): UA in the last 72 hours.     Objective:  Vitals: BP (!) 165/89   Pulse 67   Temp 97.2 °F (36.2 °C) (Axillary)   Resp 14   Ht 5' 8\" (1.727 m)   Wt 119 lb 11.4 oz (54.3 kg)   SpO2 97%   BMI 18.20 kg/m²    Wt Readings from Last 3 Encounters:   10/05/22 119 lb 11.4 oz (54.3 kg)   09/04/22 170 lb (77.1 kg)   08/16/22 170 lb (77.1 kg)      24HR INTAKE/OUTPUT:    Intake/Output Summary (Last 24 hours) at 10/7/2022 0914  Last data filed at 10/7/2022 0518  Gross per 24 hour   Intake 5109.77 ml   Output 1050 ml   Net 4059.77 ml       General: alert, in no apparent distress  HEENT: normocephalic, atraumatic, anicteric  Neck: supple, no mass  Lungs: non-labored respirations, clear to auscultation bilaterally  Heart: regular rate and rhythm, no murmurs or rubs  Abdomen: soft, non-tender, non-distended  Ext: no cyanosis, no peripheral edema  Neuro: alert and oriented, no gross abnormalities  Psych: normal mood and affect  Skin: no rash      Electronically signed by Francheska Raymond DO, MD (V5) oriented

## 2023-07-21 NOTE — H&P ADULT - NSHPPHYSICALEXAM_GEN_ALL_CORE
PEx  T(C): 36.6 (07-21-23 @ 14:54), Max: 36.8 (07-21-23 @ 11:10)  HR: 48 (07-21-23 @ 15:06) (48 - 76)  BP: 129/90 (07-21-23 @ 15:06) (129/90 - 157/96)  RR: 15 (07-21-23 @ 15:06) (15 - 19)  SpO2: 98% (07-21-23 @ 15:06) (95% - 98%)    General:   NAD.    Skin: no rash or prominent lesions  Head: normocephalic, atraumatic     Sinuses: non-tender  Nose: no external lesions, mucosa non-inflamed, septum and turbinates normal  Throat: no erythema, exudates or lesions.  Neck: Supple without lymphadenopathy. Thyroid no thyromegaly, no palpable thyroid nodules, no palpable nodules or masses, carotid arteries no bruits.   Breasts: No palpable masses or lesions.  Heart: RRR, no murmur or gallop.  Normal S1, S2.  No S3, S4.   Lungs: CTA bilaterally, no wheezes, rhonchi, rales.  Breathing unlabored.   Chest wall: Normal insp   Abdomen:  Soft, NT/ND, normal bowel sounds, no HSM, no masses.  No peritoneal signs.   Back: spine normal without deformity or tenderness.  Normal ROM   : Exam normal.  no inguinal hernias.  Extremities: No deformities, clubbing, cyanosis, or edema.  Musculoskeletal: Normal gait and station. No decreased range of motion, instability, atrophy or abnormal strength or tone in the head, neck, spine, ribs, pelvis or extremities.   Neurologic: CN 2-12 normal. Sensation to pain, touch and proprioception normal. DTRs normal in upper and lower extremities. No pathologic reflexes.  Motor normal.  Psychiatric: Oriented X3, intact recent and remote memory, judgement and insight, normal mood and affect.

## 2023-07-21 NOTE — CONSULT NOTE ADULT - SUBJECTIVE AND OBJECTIVE BOX
CHIEF COMPLAINT: Patient is a 67y old  Male who presents with a chief complaint of chest pain    FROM H&P:   68 y/o male with a PMHx of ascending aortic aneurysm (last measured at 4.1cm), COVID, depression, GERD, HLD, HTN on Amlodipine, prostate cancer s/p radical prostatectomy in February, SBO, suicidal intent, TIA presents to the ED c/o intermittent chest tightness and fatigue since last night. Pt reports he had an appt scheduled with his cardiologist next week, called and spoke with them to see if he can be seen today but was advised to come to the ED for evaluation. On 81mg ASA. Former smoker, stopped in 1980. No other complaints at this time. Cardiologist: Dr. Corrigan.        PREVIOUS DIAGNOSTIC TESTING:      ECHO: FINDINGS:    STRESS: FINDINGS:    CATHETERIZATION: FINDINGS:    MEDICATIONS  (STANDING):    MEDICATIONS  (PRN):  acetaminophen     Tablet .. 650 milliGRAM(s) Oral every 6 hours PRN Mild Pain (1 - 3)          PHYSICAL EXAM:  Vital Signs Last 24 Hrs  T(C): 36.6 (21 Jul 2023 14:54), Max: 36.8 (21 Jul 2023 11:10)  T(F): 97.8 (21 Jul 2023 14:54), Max: 98.2 (21 Jul 2023 11:10)  HR: 48 (21 Jul 2023 15:06) (48 - 76)  BP: 129/90 (21 Jul 2023 15:06) (129/90 - 157/96)  BP(mean): 103 (21 Jul 2023 15:06) (103 - 116)  RR: 15 (21 Jul 2023 15:06) (15 - 19)  SpO2: 98% (21 Jul 2023 15:06) (95% - 98%)    Parameters below as of 21 Jul 2023 15:06  Patient On (Oxygen Delivery Method): room air        Constitutional: NAD, awake and alert  HEENT: PERR, EOMI, Normal Hearing, MMM  Neck: Soft and supple, No LAD, No JVD  Respiratory: Breath sounds are clear bilaterally, No wheezing, rales or rhonchi  Cardiovascular: S1 and S2, regular rate and rhythm, no Murmurs, gallops or rubs  Gastrointestinal: Bowel Sounds present, soft, nontender, nondistended, no guarding, no rebound  Extremities: No peripheral edema  Vascular: 2+ peripheral pulses  Neurological: A/O x 3, no focal deficits  Musculoskeletal: 5/5 strength b/l upper and lower extremities  Skin: No rashes    =======================================    INTERPRETATION OF TELEMETRY:    ECG:    ========================================    LABS:                        16.0   6.57  )-----------( 229      ( 21 Jul 2023 12:23 )             46.1     07-21    140  |  109<H>  |  15  ----------------------------<  97  3.9   |  28  |  1.02    Ca    9.1      21 Jul 2023 12:23  Mg     2.2     07-21    TPro  7.7  /  Alb  3.9  /  TBili  0.6  /  DBili  x   /  AST  21  /  ALT  33  /  AlkPhos  78  07-21        PT/INR - ( 21 Jul 2023 12:23 )   PT: 12.4 sec;   INR: 1.07 ratio         PTT - ( 21 Jul 2023 12:23 )  PTT:34.5 sec  Urinalysis Basic - ( 21 Jul 2023 12:23 )    Color: x / Appearance: x / SG: x / pH: x  Gluc: 97 mg/dL / Ketone: x  / Bili: x / Urobili: x   Blood: x / Protein: x / Nitrite: x   Leuk Esterase: x / RBC: x / WBC x   Sq Epi: x / Non Sq Epi: x / Bacteria: x      I&O's Summary    BNP      RADIOLOGY & ADDITIONAL STUDIES: CHIEF COMPLAINT: Patient is a 67y old  Male who presents with a chief complaint of chest pain    FROM H&P: 66 y/o male with a PMHx of ascending aortic aneurysm (last measured at 4.1cm), COVID, depression, GERD, HLD, HTN on Amlodipine, prostate cancer s/p radical prostatectomy in February, SBO, suicidal intent, TIA presents to the ED c/o intermittent chest tightness and fatigue since last night. Pt reports he had an appt scheduled with his cardiologist next week, called and spoke with them to see if he can be seen today but was advised to come to the ED for evaluation. On 81mg ASA. Former smoker, stopped in 1980. No other complaints at this time. Cardiologist: Dr. Corrigan.    Patient seen and examined in ED with Dr. Buck  Ongoing Chest tightness, intermittent.    PREVIOUS DIAGNOSTIC TESTING:    ECHO: FINDINGS: 9/2022: EF 55-60%    MEDICATIONS:  acetaminophen     Tablet .. 650 milliGRAM(s) Oral every 6 hours PRN Mild Pain (1 - 3)  aluminum hydroxide/magnesium hydroxide/simethicone Suspension 30 milliLiter(s) Oral every 4 hours PRN Dyspepsia  melatonin 3 milliGRAM(s) Oral at bedtime PRN Insomnia  ondansetron Injectable 4 milliGRAM(s) IV Push every 8 hours PRN Nausea and/or Vomiting    HOME MEDICATIONS:  aspirin 81 mg oral tablet, chewable: 1 tab(s) orally once a day (21 Jul 2023 15:17)  doxazosin 1 mg oral tablet: 1 tab(s) orally once a day (in the morning) (21 Jul 2023 15:17)  doxazosin 2 mg oral tablet: 1 tab(s) orally once a day (in the evening) (21 Jul 2023 15:17)  Norvasc 5 mg oral tablet: 1 tab(s) orally once a day in the morning (21 Jul 2023 15:17)  rosuvastatin 5 mg oral tablet: 1 tab(s) orally once a day (at bedtime) (21 Jul 2023 15:17)    PHYSICAL EXAM:  T(C): 36.6 (21 Jul 2023 14:54), Max: 36.8 (21 Jul 2023 11:10)  T(F): 97.8 (21 Jul 2023 14:54), Max: 98.2 (21 Jul 2023 11:10)  HR: 48 (21 Jul 2023 15:06) (48 - 76)  BP: 129/90 (21 Jul 2023 15:06) (129/90 - 157/96)  BP(mean): 103 (21 Jul 2023 15:06) (103 - 116)  RR: 15 (21 Jul 2023 15:06) (15 - 19)  SpO2: 98% (21 Jul 2023 15:06) (95% - 98%)    Parameters below as of 21 Jul 2023 15:06  Patient On (Oxygen Delivery Method): room air    Constitutional: NAD, awake and alert  HEENT: PERR, EOMI, Normal Hearing, MMM  Neck: Soft and supple, No LAD, No JVD  Respiratory: Breath sounds are clear bilaterally, No wheezing, rales or rhonchi  Cardiovascular: S1 and S2, regular rate and rhythm, no Murmurs, gallops or rubs  Gastrointestinal: Bowel Sounds present, soft, nontender, nondistended, no guarding, no rebound  Extremities: No peripheral edema  Vascular: 2+ peripheral pulses  Neurological: A/O x 3, no focal deficits  Musculoskeletal: 5/5 strength b/l upper and lower extremities  Skin: No rashes    =======================================    ECG: NSR 7/21.    ========================================    LABS:                        16.0   6.57  )-----------( 229      ( 21 Jul 2023 12:23 )             46.1     07-21    140  |  109<H>  |  15  ----------------------------<  97  3.9   |  28  |  1.02    Ca    9.1      21 Jul 2023 12:23  Mg     2.2     07-21    TPro  7.7  /  Alb  3.9  /  TBili  0.6  /  DBili  x   /  AST  21  /  ALT  33  /  AlkPhos  78  07-21    PT/INR - ( 21 Jul 2023 12:23 )   PT: 12.4 sec;   INR: 1.07 ratio       PTT - ( 21 Jul 2023 12:23 )  PTT:34.5 sec    TroponinI hsT: <-4.65, <-4.41    BNP 59    RADIOLOGY & ADDITIONAL STUDIES:    7/21: CXR: Clear CHIEF COMPLAINT: Patient is a 67y old  Male who presents with a chief complaint of chest pain    FROM H&P: 66 y/o male with a PMHx of ascending aortic aneurysm (last measured at 4.1cm), COVID, depression, GERD, HLD, HTN on Amlodipine, prostate cancer s/p radical prostatectomy in February, SBO, suicidal intent, TIA presents to the ED c/o intermittent chest tightness and fatigue since last night. Pt reports he had an appt scheduled with his cardiologist next week, called and spoke with them to see if he can be seen today but was advised to come to the ED for evaluation. On 81mg ASA. Former smoker, stopped in . No other complaints at this time. Cardiologist: Dr. Corrigan.    Patient seen and examined in ED with Dr. Buck  Ongoing Chest tightness, intermittent.  Started last evening at home.   Pain is left sided, non radiating   Patient notes feeling extremely fatigued with pain  He denies SOB, palpitations, diaphoresis associated with pain.      PAST MEDICAL & SURGICAL HISTORY:  HTN (hypertension)  Suicidal intent  Depression  Hypertension  Depression  Small bowel obstruction  SBO (small bowel obstruction)  GERD (gastroesophageal reflux disease)  History of TIAs  HTN (hypertension)  HLD (hyperlipidemia)  Ascending aortic aneurysm  Reported as 4.1cm on 2022  Abdominal adhesions  2014 laprascopic lysis of adhesions  Perforated appendicitis  SBO (small bowel obstruction)  x 3  Abdominal adhesions  History of tonsillectomy  History of appendectomy  History of exploratory laparotomy  S/P laparoscopic surgery   lysis of adhesions    FAMILY HISTORY:  Family history of hypertension (Father)  FH: CML (chronic myeloid leukemia) (Mother)   age 88  Family history of peripheral vascular disease (Father)  FH: HTN (hypertension) (Father, Mother)    PREVIOUS DIAGNOSTIC TESTING:    ECHO: FINDINGS: 2022: EF 55-60%    MEDICATIONS:  acetaminophen     Tablet .. 650 milliGRAM(s) Oral every 6 hours PRN Mild Pain (1 - 3)  aluminum hydroxide/magnesium hydroxide/simethicone Suspension 30 milliLiter(s) Oral every 4 hours PRN Dyspepsia  melatonin 3 milliGRAM(s) Oral at bedtime PRN Insomnia  ondansetron Injectable 4 milliGRAM(s) IV Push every 8 hours PRN Nausea and/or Vomiting    HOME MEDICATIONS:  aspirin 81 mg oral tablet, chewable: 1 tab(s) orally once a day (2023 15:17)  doxazosin 1 mg oral tablet: 1 tab(s) orally once a day (in the morning) (2023 15:17)  doxazosin 2 mg oral tablet: 1 tab(s) orally once a day (in the evening) (2023 15:17)  Norvasc 5 mg oral tablet: 1 tab(s) orally once a day in the morning (2023 15:17)  rosuvastatin 5 mg oral tablet: 1 tab(s) orally once a day (at bedtime) (2023 15:17)    PHYSICAL EXAM:  T(C): 36.6 (2023 14:54), Max: 36.8 (2023 11:10)  T(F): 97.8 (2023 14:54), Max: 98.2 (2023 11:10)  HR: 48 (2023 15:06) (48 - 76)  BP: 129/90 (2023 15:06) (129/90 - 157/96)  BP(mean): 103 (2023 15:06) (103 - 116)  RR: 15 (2023 15:06) (15 - 19)  SpO2: 98% (2023 15:06) (95% - 98%)    Parameters below as of 2023 15:06  Patient On (Oxygen Delivery Method): room air    Constitutional: NAD, awake and alert  HEENT: PERR, EOMI, Normal Hearing, MMM  Neck: Soft and supple, No LAD, No JVD  Respiratory: Breath sounds are clear bilaterally, No wheezing, rales or rhonchi  Cardiovascular: S1 and S2, regular rate and rhythm, no Murmurs, gallops or rubs  Gastrointestinal: Bowel Sounds present, soft, nontender, nondistended, no guarding, no rebound  Extremities: No peripheral edema  Vascular: 2+ peripheral pulses  Neurological: A/O x 3, no focal deficits  Musculoskeletal: 5/5 strength b/l upper and lower extremities  Skin: No rashes    =======================================    ECG: NSR .    ========================================    LABS:                        16.0   6.57  )-----------( 229      ( 2023 12:23 )             46.1         140  |  109<H>  |  15  ----------------------------<  97  3.9   |  28  |  1.02    Ca    9.1      2023 12:23  Mg     2.2         TPro  7.7  /  Alb  3.9  /  TBili  0.6  /  DBili  x   /  AST    /  ALT  33  /  AlkPhos  78      PT/INR - ( 2023 12:23 )   PT: 12.4 sec;   INR: 1.07 ratio       PTT - ( 2023 12:23 )  PTT:34.5 sec    TroponinI hsT: <-4.65, <-4.41    BNP 59    RADIOLOGY & ADDITIONAL STUDIES:    : CXR: Clear

## 2023-07-21 NOTE — ED ADULT NURSE NOTE - NS ED NURSE REPORT GIVEN TO FT
NERIS Guardado tra CORDON Hx of tachy-wellington syndrome  PPM (meditronic)  EKG - Apaced, NSR, no ST changes  Consult EP in AM for PPM interrogation CHFrEF (mod-severe) 2017 TTE  F/u TTE  Currently hypovolemic on exam  Continue ASA, statin, BB, Imdur, Hydralazine   Torsemide and Losartan held in the setting of CLARI

## 2023-07-21 NOTE — ED PROVIDER NOTE - OBJECTIVE STATEMENT
66 y/o male with a PMHx of ascending aortic aneurysm (last measured at 4.1cm), COVID, depression, GERD, HLD, HTN on Amlodipine, prostate cancer s/p radical prostatectomy in February, SBO, suicidal intent, TIA presents to the ED c/o intermittent chest tightness and fatigue since last night. Pt reports he had an appt scheduled with his cardiologist next week, called and spoke with them to see if he can be seen today but was advised to come to the ED for evaluation. On 81mg ASA. Former smoker, stopped in 1980. No other complaints at this time. Cardiologist: Dr. Corrigan.

## 2023-07-21 NOTE — H&P ADULT - NSHPREVIEWOFSYSTEMS_GEN_ALL_CORE
ROS:  General:  No fevers, chills, or unexplained weight loss  Skin: No rash or bothersome skin lesions  Musculoskeletal: No arthalgias, myalgias or joint swelling  Eyes: No visual changes or eye pain  Ears: No hearing loss , otorrhea or ear pain  Nose, Mouth, Throat: No nasal congestion, rhinorrhea, oral lesions, postnasal drip or sore throat  Cardio see hpi.    Respiratory: No cough, shortness of breath or wheezing   GI: No diarrhea, constipation, blood in stools, abdominal pain, vomiting or heartburn  : No urinary frequency, hematuria, incontinence, or dysuria  Neurologic: No headaches, parasthesias, confusion, dysarthria or gait instability  Psychiatric:  No anxiety or depression  Lymphatic:  No easy bruising, easy bleeding or swollen glands  Allergic: No itching, sneezing , watery eyes, clear rhinorrhea or recurrent infections

## 2023-07-21 NOTE — CHART NOTE - NSCHARTNOTEFT_GEN_A_CORE
Pt brought to cath lab for cardiac cath with possible PCI who presented with c/o chest tightness last night and early this morning. Pt currently denies CP , SOB, CE neg x 2.   Seen and examined Pt in holding.   VSS, A+O x4, no complaints at this time    ASA class: II  Cr: 1.02  GFR:  81  Bleeding risk:  1.0%  Wayne Score:  1  pt    Plan  [facility] with possible PCI  SHARIF prehydration started in ER  -Consent obtained for cardiac catheterization w/ coronary angiogram, possible sedation and/or analgesia and possible stent placement. Pt is competent, has capacity, and understands risks and benefits of procedure. Risks and benefits discussed. Risk discussed included, but not limited to MI, stroke, mortality, major bleeding, arrythmia, or infection. All questions answered

## 2023-07-21 NOTE — H&P ADULT - HISTORY OF PRESENT ILLNESS
68 y/o male with PMHx of HTN, HLD, depression, aortic aneurysm, GERD, TIA, multiple SBO in the past, PVCs who presented to  with CC of chest pain.  Patient notes CP started last evening at home.  Patient notes he does have similar CP in the past.  Pain is left sided chest, described as tightness.  Patient notes feeling extremely fatigued with pain.  Pain came and went-- lasting 5-20 min at a time.  He ended up going to bed.  When he woke up today, pain was still present but not as severe.  He denies SOB, palpitations, diaphoresis associated with pain.  He was supposed to see Dr. Corrigan in the office next week but called the office today to be seen earlier.  He said he was having CP and they referred him to the ER.  In the Er, patient had EKG which was nonischemic.  Trop x 2 which were negative.  Patient was seen by cardio and decision to admit for CATH.  Last stress test in the office was in .        PAST MEDICAL & SURGICAL HISTORY:  HTN (hypertension)  Suicidal intent  Depression  Hypertension  Depression  Small bowel obstruction  SBO (small bowel obstruction)  GERD (gastroesophageal reflux disease)  History of TIAs  HTN (hypertension)  HLD (hyperlipidemia)  Ascending aortic aneurysm  Reported as 4.1cm on 2022  Abdominal adhesions  2014 laprascopic lysis of adhesions  Perforated appendicitis  SBO (small bowel obstruction)  x 3  Abdominal adhesions  History of tonsillectomy  History of appendectomy  History of exploratory laparotomy  S/P laparoscopic surgery   lysis of adhesions      FAMILY HISTORY:  Family history of hypertension (Father)  parent    FH: CML (chronic myeloid leukemia) (Mother)   age 88    Family history of peripheral vascular disease (Father)    FH: HTN (hypertension) (Father, Mother)      Social History:    Lives at home with GF.    No smoking /etoh use.        Allergies:    erythromycin (Other)  Zoloft (Hypotension)

## 2023-07-21 NOTE — ED ADULT NURSE NOTE - NSFALLUNIVINTERV_ED_ALL_ED
Bed/Stretcher in lowest position, wheels locked, appropriate side rails in place/Call bell, personal items and telephone in reach/Instruct patient to call for assistance before getting out of bed/chair/stretcher/Non-slip footwear applied when patient is off stretcher/[place] to call system/Physically safe environment - no spills, clutter or unnecessary equipment/Purposeful proactive rounding/Room/bathroom lighting operational, light cord in reach

## 2023-07-21 NOTE — ED STATDOCS - PROGRESS NOTE DETAILS
Daquan Gonzalez for Dr. Huitron: Pt is a 67y male with a PMH of HTN presents to ED SIB cardiologist Dr. Corrigan for intermittent chest tightness and lethargy onset last night. Did some heart readings at home w/ abnormal findings. Was evaluated in  for PVCs in past. Took ACs PTA. Denies shortness of breath and palpitations. Allergic to erythromycin and Zoloft.    Will send to main for further evaluation. Daquan Gonzalez for Dr. Huitron: Pt is a 67y male with a PMH of HTN presents to ED SIB cardiologist Dr. Corrigan for intermittent chest tightness and lethargy onset last night. Did some heart readings at home w/ abnormal findings. Was evaluated in  for PVCs in past. Took ACs PTA. Denies shortness of breath and palpitations. Allergic to erythromycin and Zoloft. Pt with elevated BP and active chest tightness, sent to [facility] for further eval.     Will send to [facility] for further evaluation.

## 2023-07-21 NOTE — ED PROVIDER NOTE - NSICDXPASTMEDICALHX_GEN_ALL_CORE_FT
PAST MEDICAL HISTORY:  Ascending aortic aneurysm Reported as 4.1cm on 5/2022    Depression     Depression     GERD (gastroesophageal reflux disease)     History of TIAs     HLD (hyperlipidemia)     HTN (hypertension)     HTN (hypertension)     Hypertension     SBO (small bowel obstruction)     Small bowel obstruction     Suicidal intent

## 2023-07-21 NOTE — PACU DISCHARGE NOTE - COMMENTS
pt a+o x4. vital signs stable. Pt safety maintained. Rt radial site benign, DGS dry and intact, no hematoma or bleeding noted. Site soft & nontender, +2 palpable pulses bilaterally. Bilateral upper extremities warm/intact/mobile. PIVL site benign. Skin intact. Report given to accepting RN: Steph , Transferred via stretcher with CM and RN in attendance in stable condition. Spontaneous, unlabored and symmetrical

## 2023-07-21 NOTE — BRIEF OPERATIVE NOTE - NSICDXBRIEFPOSTOP_GEN_ALL_CORE_FT
POST-OP DIAGNOSIS:  Nonobstructive atherosclerosis of coronary artery 21-Jul-2023 17:34:02  Karolina Leonard

## 2023-07-21 NOTE — PHARMACOTHERAPY INTERVENTION NOTE - COMMENTS
Medication reconciliation completed.  Reviewed Medication list and confirmed med allergies with patient; confirmed with Dr. [facility].

## 2023-07-21 NOTE — ED PROVIDER NOTE - ATTENDING CONTRIBUTION TO CARE
I, Bernadette Sharif DO,  performed the initial face to face bedside interview with this patient regarding history of present illness, review of symptoms and relevant past medical, social and family history.  I completed an independent physical examination.  I was the initial provider who evaluated this patient. I have signed out the follow up of any pending tests (i.e. labs, radiological studies) to the resident.  I have communicated the patient’s plan of care and disposition with the resident.  The history, relevant review of systems, past medical and surgical history, medical decision making, and physical examination was documented by the scribe in my presence and I attest to the accuracy of the documentation.

## 2023-07-21 NOTE — H&P ADULT - ASSESSMENT
68 y/o male with PMHx of HTN, HLD, depression, aortic aneurysm, GERD, TIA, multiple SBO in the past, PVCs who presented to  with CC of chest pain.    #Chest pain-- R/o ACS:    Admit to tele.    Trend enzymes.    D/w Dr. Buck-- plan for CATH today.    ASA/ statin.    Last stress test was 2016.    Cardio f/u.      #HTN:    Cont home norvasc.      #HLD:    Cont statin.      #BPH:    Cont cardura.      #Aortic Aneursym:    Follows yearly.  4.1 cm.      #DVT Proph:  ambulation.

## 2023-07-21 NOTE — ED PROVIDER NOTE - PROGRESS NOTE DETAILS
Graham Kerns, DO PGY-3: Patient with intermittent chest pain for a day, was told to come in by outpatient cardiologist, with hypertension, hyperlipidemia.  Plan for troponin, labs, EKG, chest x-ray, speak with patient's cardiologist, likely admission. Graham Kerns, DO PGY-3: With Dr. Corrigan's NP, patient had normal echo in September 2022 but has not had a stress test in at least 2 years.  Patient's initial work-up was negative.  Patient pending repeat troponin at 1530.  Will get repeat troponin and admit patient for further ischemic work-up

## 2023-07-21 NOTE — ED ADULT NURSE NOTE - OBJECTIVE STATEMENT
pt presents to ED from home for chest pain. intermittent since last night. hx of ascending aortic aneurysm, HTN, TIA. took 81mg ASA PTA. ekg performed, cardiac monitoring in place.

## 2023-07-21 NOTE — ED PROVIDER NOTE - NSICDXPASTSURGICALHX_GEN_ALL_CORE_FT
PAST SURGICAL HISTORY:  Abdominal adhesions 9/18/2014 laprascopic lysis of adhesions    Abdominal adhesions     History of appendectomy     History of exploratory laparotomy     History of tonsillectomy     Perforated appendicitis     S/P laparoscopic surgery 2014 lysis of adhesions    SBO (small bowel obstruction) x 3

## 2023-07-22 ENCOUNTER — TRANSCRIPTION ENCOUNTER (OUTPATIENT)
Age: 68
End: 2023-07-22

## 2023-07-22 VITALS
TEMPERATURE: 98 F | DIASTOLIC BLOOD PRESSURE: 75 MMHG | SYSTOLIC BLOOD PRESSURE: 128 MMHG | RESPIRATION RATE: 17 BRPM | OXYGEN SATURATION: 93 % | HEART RATE: 55 BPM

## 2023-07-22 LAB
A1C WITH ESTIMATED AVERAGE GLUCOSE RESULT: 5.6 % — SIGNIFICANT CHANGE UP (ref 4–5.6)
ANION GAP SERPL CALC-SCNC: 5 MMOL/L — SIGNIFICANT CHANGE UP (ref 5–17)
BUN SERPL-MCNC: 15 MG/DL — SIGNIFICANT CHANGE UP (ref 7–23)
CALCIUM SERPL-MCNC: 8.8 MG/DL — SIGNIFICANT CHANGE UP (ref 8.5–10.1)
CHLORIDE SERPL-SCNC: 110 MMOL/L — HIGH (ref 96–108)
CHOLEST SERPL-MCNC: 128 MG/DL — SIGNIFICANT CHANGE UP
CO2 SERPL-SCNC: 26 MMOL/L — SIGNIFICANT CHANGE UP (ref 22–31)
CREAT SERPL-MCNC: 0.86 MG/DL — SIGNIFICANT CHANGE UP (ref 0.5–1.3)
EGFR: 96 ML/MIN/1.73M2 — SIGNIFICANT CHANGE UP
ESTIMATED AVERAGE GLUCOSE: 114 MG/DL — SIGNIFICANT CHANGE UP (ref 68–114)
GLUCOSE SERPL-MCNC: 93 MG/DL — SIGNIFICANT CHANGE UP (ref 70–99)
HCT VFR BLD CALC: 45.9 % — SIGNIFICANT CHANGE UP (ref 39–50)
HDLC SERPL-MCNC: 54 MG/DL — SIGNIFICANT CHANGE UP
HGB BLD-MCNC: 15.7 G/DL — SIGNIFICANT CHANGE UP (ref 13–17)
LIPID PNL WITH DIRECT LDL SERPL: 51 MG/DL — SIGNIFICANT CHANGE UP
MCHC RBC-ENTMCNC: 30.3 PG — SIGNIFICANT CHANGE UP (ref 27–34)
MCHC RBC-ENTMCNC: 34.2 GM/DL — SIGNIFICANT CHANGE UP (ref 32–36)
MCV RBC AUTO: 88.6 FL — SIGNIFICANT CHANGE UP (ref 80–100)
NON HDL CHOLESTEROL: 73 MG/DL — SIGNIFICANT CHANGE UP
PLATELET # BLD AUTO: 224 K/UL — SIGNIFICANT CHANGE UP (ref 150–400)
POTASSIUM SERPL-MCNC: 3.9 MMOL/L — SIGNIFICANT CHANGE UP (ref 3.5–5.3)
POTASSIUM SERPL-SCNC: 3.9 MMOL/L — SIGNIFICANT CHANGE UP (ref 3.5–5.3)
RBC # BLD: 5.18 M/UL — SIGNIFICANT CHANGE UP (ref 4.2–5.8)
RBC # FLD: 14 % — SIGNIFICANT CHANGE UP (ref 10.3–14.5)
SODIUM SERPL-SCNC: 141 MMOL/L — SIGNIFICANT CHANGE UP (ref 135–145)
TRIGL SERPL-MCNC: 124 MG/DL — SIGNIFICANT CHANGE UP
TSH SERPL-MCNC: 1.23 UU/ML — SIGNIFICANT CHANGE UP (ref 0.34–4.82)
WBC # BLD: 6.5 K/UL — SIGNIFICANT CHANGE UP (ref 3.8–10.5)
WBC # FLD AUTO: 6.5 K/UL — SIGNIFICANT CHANGE UP (ref 3.8–10.5)

## 2023-07-22 RX ORDER — AMLODIPINE BESYLATE 2.5 MG/1
5 TABLET ORAL ONCE
Refills: 0 | Status: DISCONTINUED | OUTPATIENT
Start: 2023-07-22 | End: 2023-07-22

## 2023-07-22 RX ADMIN — Medication 81 MILLIGRAM(S): at 11:24

## 2023-07-22 NOTE — PROGRESS NOTE ADULT - SUBJECTIVE AND OBJECTIVE BOX
Nurse Practitioner Progress note:     HPI:  66 y/o male with PMHx of HTN, HLD, depression, aortic aneurysm, GERD, TIA, multiple SBO in the past, PVCs who presented to  with CC of chest pain.  Patient notes CP started last evening at home.  Patient notes he does have similar CP in the past.  Pain is left sided chest, described as tightness.  Patient notes feeling extremely fatigued with pain.  Pain came and went-- lasting 5-20 min at a time.  He ended up going to bed.  When he woke up today, pain was still present but not as severe.  He denies SOB, palpitations, diaphoresis associated with pain.  He was supposed to see Dr. Corrigan in the office next week but called the office today to be seen earlier.  He said he was having CP and they referred him to the ER.  In the Er, patient had EKG which was nonischemic.  Trop x 2 which were negative.  Patient was seen by cardio and decision to admit for CATH.  Last stress test in the office was in 2016.      S/p LHC revealing non obstructive disease , possible enlarged aorta     T(C): 36.6 (07-21-23 @ 14:54), Max: 36.8 (07-21-23 @ 11:10)  HR: 48 (07-21-23 @ 15:06) (48 - 76)  BP: 129/90 (07-21-23 @ 15:06) (129/90 - 157/96)  RR: 15 (07-21-23 @ 15:06) (15 - 19)  SpO2: 98% (07-21-23 @ 15:06) (95% - 98%)      PHYSICAL EXAM:  NEURO: Non-focal, AxOx3.  No neuro deficits NECK: Supple, No JVD, No LAD  CHEST/LUNG: Clear to auscultation bilaterally; No wheeze  HEART: s1 s2 Regular rate and rhythm; No murmurs, rubs, or gallops  ABDOMEN: Soft, Nontender, Nondistended; Bowel sounds present X 4 quadrants   EXTREMITIES:  2+ Peripheral Pulses, No clubbing, cyanosis, or edema   VASCULAR: Peripheral pulses palpable 2+ bilaterally  PROCEDURE SITE: right band removed one hour post placement ,Site is without hematoma or bleeding. Sensation and ECTOR intact. Distal pulses palpable 2+, capillary refill < 2 seconds. Patient denies pain, numbness, tingling, CP or SOB. Clean dry dressing applied     PROCEDURE RESULTS: full report to follow     ASSESSMENT: HPI:  66 y/o male with PMHx of HTN, HLD, depression, aortic aneurysm, GERD, TIA, multiple SBO in the past, PVCs who presented to  with CC of chest pain.  Patient notes CP started last evening at home.  Patient notes he does have similar CP in the past.  Pain is left sided chest, described as tightness.  Patient notes feeling extremely fatigued with pain.  Pain came and went-- lasting 5-20 min at a time.  He ended up going to bed.  When he woke up today, pain was still present but not as severe.  He denies SOB, palpitations, diaphoresis associated with pain.  He was supposed to see Dr. Corrigan in the office next week but called the office today to be seen earlier.  He said he was having CP and they referred him to the ER.  In the Er, patient had EKG which was nonischemic.  Trop x 2 which were negative.  Patient was seen by cardio and decision to admit for CATH.  Last stress test in the office was in 2016.      S/P LHC revealing non obstructive disease with possible enlarged aorta     PLAN:  -VS, diet, activity as per post cath orders  -Gianna post hydration NS 184cc/hr x 6hrs  -Continue current medications  -Plan of care discussed with patient and Dr Buck  -Post cath instructions reviewed, patient verbalizes and understands instructions  -remainder of care to be addressed by primary team          
Patient is a 67y old  Male who presents with a chief complaint of chest pain (21 Jul 2023 17:34)      7/22- no further CP , telel unremarkable   cath normal cors   MEDICATIONS  (STANDING):  amLODIPine   Tablet 5 milliGRAM(s) Oral at bedtime  aspirin enteric coated 81 milliGRAM(s) Oral daily  atorvastatin 20 milliGRAM(s) Oral at bedtime  doxazosin 1 milliGRAM(s) Oral daily  doxazosin 2 milliGRAM(s) Oral at bedtime  sodium chloride 0.9%. 1000 milliLiter(s) (164 mL/Hr) IV Continuous <Continuous>    MEDICATIONS  (PRN):  acetaminophen     Tablet .. 650 milliGRAM(s) Oral every 6 hours PRN Mild Pain (1 - 3)  aluminum hydroxide/magnesium hydroxide/simethicone Suspension 30 milliLiter(s) Oral every 4 hours PRN Dyspepsia  melatonin 3 milliGRAM(s) Oral at bedtime PRN Insomnia  ondansetron Injectable 4 milliGRAM(s) IV Push every 8 hours PRN Nausea and/or Vomiting            Vital Signs Last 24 Hrs  T(C): 36.7 (22 Jul 2023 09:37), Max: 36.9 (21 Jul 2023 20:00)  T(F): 98.1 (22 Jul 2023 09:37), Max: 98.4 (21 Jul 2023 20:00)  HR: 55 (22 Jul 2023 09:37) (46 - 89)  BP: 128/75 (22 Jul 2023 09:37) (127/78 - 148/86)  BP(mean): 88 (22 Jul 2023 09:37) (88 - 103)  RR: 17 (22 Jul 2023 09:37) (15 - 18)  SpO2: 93% (22 Jul 2023 09:37) (93% - 100%)    Parameters below as of 22 Jul 2023 09:37  Patient On (Oxygen Delivery Method): room air                INTERPRETATION OF TELEMETRY:    ECG:        LABS:                        15.7   6.50  )-----------( 224      ( 22 Jul 2023 07:19 )             45.9     07-22    141  |  110<H>  |  15  ----------------------------<  93  3.9   |  26  |  0.86    Ca    8.8      22 Jul 2023 07:19  Mg     2.2     07-21    TPro  7.7  /  Alb  3.9  /  TBili  0.6  /  DBili  x   /  AST  21  /  ALT  33  /  AlkPhos  78  07-21        PT/INR - ( 21 Jul 2023 12:23 )   PT: 12.4 sec;   INR: 1.07 ratio         PTT - ( 21 Jul 2023 12:23 )  PTT:34.5 sec  Urinalysis Basic - ( 22 Jul 2023 07:19 )    Color: x / Appearance: x / SG: x / pH: x  Gluc: 93 mg/dL / Ketone: x  / Bili: x / Urobili: x   Blood: x / Protein: x / Nitrite: x   Leuk Esterase: x / RBC: x / WBC x   Sq Epi: x / Non Sq Epi: x / Bacteria: x      I&O's Summary    21 Jul 2023 07:01  -  22 Jul 2023 07:00  --------------------------------------------------------  IN: 164 mL / OUT: 0 mL / NET: 164 mL      BNP  RADIOLOGY & ADDITIONAL STUDIES:

## 2023-07-22 NOTE — DISCHARGE NOTE PROVIDER - PROVIDER TOKENS
PROVIDER:[TOKEN:[[id number]],FOLLOWUP:[1 week]],PROVIDER:[TOKEN:[[id number]],FOLLOWUP:[1 week]]

## 2023-07-22 NOTE — PROGRESS NOTE ADULT - ASSESSMENT
68 y/o male with PMHx of HTN, HLD, depression, aortic aneurysm, GERD, TIA, multiple SBO in the past, PVCs who presented to  with CC of chest pain.    #Chest Pain R/o ACS.   Monitor on tele. No events thus far  Troponin negative x2, EKG no ischemic changes  Continue with aspirin, atorvastatin   Last stress test was 2016.    carh normal cors   look for alternative reasons for chest pain as an oupt   Check lipids and a1c  CXR Clear    #HTN. BP slightly elevated. Monitor, may need med adjustments.    #HLD. Continue atorvastatin. Check lipids.    #Aortic Aneurysm. Follows yearly at clinic. 4.1 cm.        seems stable for DC from a cardiac standpoint.

## 2023-07-22 NOTE — DISCHARGE NOTE PROVIDER - CARE PROVIDER_API CALL
Hong Klein  Family Medicine  [address], [address]  [place] [address]  Phone: [phone]  Fax: [phone]  Follow Up Time: 1 week    Gaye Corrigan  Cardiovascular Disease  [address] [place] [address]  Phone: [phone]  Fax: [phone]  Follow Up Time: 1 week

## 2023-07-22 NOTE — DISCHARGE NOTE PROVIDER - HOSPITAL COURSE
68 y/o male with HTN, HLD, hx of PVCs, aortic aneurysm, hx of TIA, GERD, remote hx of appendectomy, abdominal adhesions, episodes of SBO, presented to  ED with complaint of chest discomfort, started on the evening of 7/20, L sided, lasted under 20 minutes and then resolved. He went to bed and woke up on 7/21 and the pain was back but less severe. He was scheduled to see Cardiology Dr. Corrigan in the office next week but called the office to alert them of the symptom and they referred him to the ED. In the ED, /96, other vitals WNL. Exam unrevealing. CBC, CMP normal. Troponin negative. BNP normal. EKG without acute ischemic changes or dysrhythmia. CXR clear. He was given aspirin and admitted to Medicine.     Chest discomfort  Since improved. No signs or symptoms of pneumonia. CXR clear. Reassuringly, troponin negative x2, EKG with no ischemic changes. Patient was seen and evaluated by Cardiology, taken for L heart cath 7/21. No occlusive CAD noted. Cardiology advises continued aspirin and statin, follow up as outpatient with Dr. Corrigan. Stable for MT home.     HTN  Uncontrolled on presentation. Continued home regimen. BP now improved.     Aortic aneurysm  As noted on testing this admission, 4.1cm. Continue to monitor as outpatient in office yearly.     Hx of PVCs  None noted here. Could consider 30-day event monitoring as outpatient to assess for PVC burden. Defer to Cardiology as outpatient, Dr Corrigan.     GERD  No heartburn complaints. No epigastric pain. His presenting complaint could have been GERD, bit unclear. Advise PCP follow up. Avoid spicy foods. Avoid eating late in evening.       Discharge Exam:  Afebrile  /75  HR 55  RR 18  O2 95% on RA  Gen: comfortable appearing  HEENT: NCAT PERRL EOMI MMM clear oropharynx  Neck: Supple, no JVD, no LAD  CVS: s1 s2 normal, RRR  Chest: Normal resp effort, lungs CTA B/L  Abd: +BS, soft NT ND   Ext: No edema or calf tenderness  Skin: Warm, dry  Mood: Calm, pleasant  Neuro: A+OX3, no deficits    Labs:                        15.7   6.50  )---------( 224                  45.9       141  |  110  |  15  ---------------------<  93  3.9   |  26  |  0.86    Ca    8.8     Mg    2.2         TPro  7.7  /  Alb  3.9  /  TBili  0.6  /  DBili  x   /  AST  21  /  ALT  33  /  AlkPhos  78       PT: 12.4 sec;   INR: 1.07 ratio      Serial troponin negative   proBNP WNL    Cardiac Testing:  L heart cath 7/21: Non occlusive CAD. Full report pending    EKG 7/21: NSR, rate 77, no ischemic changes     Imaging:  CXR 7/21: Heart size, mediastinum, hilum and aorta are within normal limits for projection. Midline trachea. There is no focal infiltrate, congestion or effusion. There is no fracture. Degenerative changes of the spine.

## 2023-07-22 NOTE — DISCHARGE NOTE NURSING/CASE MANAGEMENT/SOCIAL WORK - NSDCPEFALRISK_GEN_ALL_CORE
For information on Fall & Injury Prevention, visit: [email or web address] OR  [email or web address] OR  https://www.cdc.gov/steadi/patient.html

## 2023-07-22 NOTE — DISCHARGE NOTE PROVIDER - NSDCCPCAREPLAN_GEN_ALL_CORE_FT
PRINCIPAL DISCHARGE DIAGNOSIS  Diagnosis: Chest pain  Assessment and Plan of Treatment: You were evaluated and treated in the hospital for chest discomfort. In the ED you were noted to have elevated blood pressure, other vitals normal and exam normal. Labs also unremarkable. CBC, CMP normal. Serial troponin cardiac blood test was negative. BNP blood test normal with no sign of congestive heart failure. EKG done, normal. No sign of acute ischemia. No dysrhythmia. Chest xray was clear. No pneumonia, no fluid congestion. You were given aspirin and admitted to Medicine. You were seen by Cardiology and you were taken for L heart cath (coronary angiogram) on 7/21. You had non occlusive CAD. No need for stent placement. Regarding your past (premature ventricular contrations) PVCs, consideration was given that you could have had recurrence of these PVCs causing your presenting complaint. However, none were noted here on EKG or continuous cardiac telemetry monitoring. Could consider 30-day event monitoring as outpatient to assess for PVC burden, will defer to Cardiology Dr. Corrigan as outpatient. Cardiology advises that you are stable for discharge home at this time. Continue your home cardiac regimen of anti-hypertensive medication, as well as aspirin and rosuvastatin.      SECONDARY DISCHARGE DIAGNOSES  Diagnosis: Hypertension  Assessment and Plan of Treatment: Uncontrolled on presentation but now improved. Continue home regimen.    Diagnosis: History of aortic aneurysm  Assessment and Plan of Treatment: Known aortic aneurysm. Re-demonstrated this admission, 4.1cm. Continue to monitor as outpatient in office yearly.    Diagnosis: GERD (gastroesophageal reflux disease)  Assessment and Plan of Treatment: No heartburn complaints. No epigastric pain. Nonetheless, your presenting complaint could have been atypical presentation of GERD. Advise PCP follow up. Avoid spicy foods. Avoid eating late in evening. Will defer to your PCP as to whether you should start PPI to reduce stomach acid (for example, pantoprazole, omeprazole, etc).

## 2023-07-22 NOTE — DISCHARGE NOTE NURSING/CASE MANAGEMENT/SOCIAL WORK - PATIENT PORTAL LINK FT
You can access the FollowMyHealth Patient Portal offered by [facility] by registering at the following website: [email or web address]. By joining [employer]’s FollowMyHealth portal, you will also be able to view your health information using other applications (apps) compatible with our system.

## 2023-07-22 NOTE — DISCHARGE NOTE PROVIDER - NSDCMRMEDTOKEN_GEN_ALL_CORE_FT
aspirin 81 mg oral tablet, chewable: 1 tab(s) orally once a day  doxazosin 1 mg oral tablet: 1 tab(s) orally once a day (in the morning)  doxazosin 2 mg oral tablet: 1 tab(s) orally once a day (in the evening)  Norvasc 5 mg oral tablet: 1 tab(s) orally once a day in the morning  rosuvastatin 5 mg oral tablet: 1 tab(s) orally once a day (at bedtime)

## 2023-07-23 ENCOUNTER — EMERGENCY (EMERGENCY)
Facility: HOSPITAL | Age: 68
LOS: 0 days | Discharge: HOME | End: 2023-07-23
Attending: EMERGENCY MEDICINE
Payer: MEDICARE

## 2023-07-23 VITALS
TEMPERATURE: 98 F | SYSTOLIC BLOOD PRESSURE: 116 MMHG | DIASTOLIC BLOOD PRESSURE: 93 MMHG | RESPIRATION RATE: 18 BRPM | OXYGEN SATURATION: 96 % | HEART RATE: 59 BPM

## 2023-07-23 VITALS — WEIGHT: 179.9 LBS | HEIGHT: 69 IN

## 2023-07-23 DIAGNOSIS — Z98.890 OTHER SPECIFIED POSTPROCEDURAL STATES: Chronic | ICD-10-CM

## 2023-07-23 DIAGNOSIS — Z90.49 ACQUIRED ABSENCE OF OTHER SPECIFIED PARTS OF DIGESTIVE TRACT: Chronic | ICD-10-CM

## 2023-07-23 DIAGNOSIS — M79.662 PAIN IN LEFT LOWER LEG: ICD-10-CM

## 2023-07-23 DIAGNOSIS — E78.5 HYPERLIPIDEMIA, UNSPECIFIED: ICD-10-CM

## 2023-07-23 DIAGNOSIS — Z86.73 PERSONAL HISTORY OF TRANSIENT ISCHEMIC ATTACK (TIA), AND CEREBRAL INFARCTION WITHOUT RESIDUAL DEFICITS: ICD-10-CM

## 2023-07-23 DIAGNOSIS — Z87.19 PERSONAL HISTORY OF OTHER DISEASES OF THE DIGESTIVE SYSTEM: ICD-10-CM

## 2023-07-23 DIAGNOSIS — K66.0 PERITONEAL ADHESIONS (POSTPROCEDURAL) (POSTINFECTION): Chronic | ICD-10-CM

## 2023-07-23 DIAGNOSIS — Z90.89 ACQUIRED ABSENCE OF OTHER ORGANS: Chronic | ICD-10-CM

## 2023-07-23 DIAGNOSIS — K35.2 ACUTE APPENDICITIS WITH GENERALIZED PERITONITIS: Chronic | ICD-10-CM

## 2023-07-23 DIAGNOSIS — Z88.8 ALLERGY STATUS TO OTHER DRUGS, MEDICAMENTS AND BIOLOGICAL SUBSTANCES: ICD-10-CM

## 2023-07-23 DIAGNOSIS — K56.69 OTHER INTESTINAL OBSTRUCTION: Chronic | ICD-10-CM

## 2023-07-23 DIAGNOSIS — Z88.1 ALLERGY STATUS TO OTHER ANTIBIOTIC AGENTS STATUS: ICD-10-CM

## 2023-07-23 DIAGNOSIS — I71.21 ANEURYSM OF THE ASCENDING AORTA, WITHOUT RUPTURE: ICD-10-CM

## 2023-07-23 DIAGNOSIS — Z79.82 LONG TERM (CURRENT) USE OF ASPIRIN: ICD-10-CM

## 2023-07-23 DIAGNOSIS — I10 ESSENTIAL (PRIMARY) HYPERTENSION: ICD-10-CM

## 2023-07-23 DIAGNOSIS — Z90.89 ACQUIRED ABSENCE OF OTHER ORGANS: ICD-10-CM

## 2023-07-23 DIAGNOSIS — F32.A DEPRESSION, UNSPECIFIED: ICD-10-CM

## 2023-07-23 DIAGNOSIS — Z90.49 ACQUIRED ABSENCE OF OTHER SPECIFIED PARTS OF DIGESTIVE TRACT: ICD-10-CM

## 2023-07-23 DIAGNOSIS — R05.9 COUGH, UNSPECIFIED: ICD-10-CM

## 2023-07-23 LAB
ALBUMIN SERPL ELPH-MCNC: 4 G/DL — SIGNIFICANT CHANGE UP (ref 3.3–5)
ALP SERPL-CCNC: 77 U/L — SIGNIFICANT CHANGE UP (ref 40–120)
ALT FLD-CCNC: 30 U/L — SIGNIFICANT CHANGE UP (ref 12–78)
ANION GAP SERPL CALC-SCNC: 6 MMOL/L — SIGNIFICANT CHANGE UP (ref 5–17)
AST SERPL-CCNC: 20 U/L — SIGNIFICANT CHANGE UP (ref 15–37)
BASOPHILS # BLD AUTO: 0.02 K/UL — SIGNIFICANT CHANGE UP (ref 0–0.2)
BASOPHILS NFR BLD AUTO: 0.2 % — SIGNIFICANT CHANGE UP (ref 0–2)
BILIRUB SERPL-MCNC: 0.6 MG/DL — SIGNIFICANT CHANGE UP (ref 0.2–1.2)
BUN SERPL-MCNC: 17 MG/DL — SIGNIFICANT CHANGE UP (ref 7–23)
CALCIUM SERPL-MCNC: 9.2 MG/DL — SIGNIFICANT CHANGE UP (ref 8.5–10.1)
CHLORIDE SERPL-SCNC: 110 MMOL/L — HIGH (ref 96–108)
CO2 SERPL-SCNC: 25 MMOL/L — SIGNIFICANT CHANGE UP (ref 22–31)
CREAT SERPL-MCNC: 1.05 MG/DL — SIGNIFICANT CHANGE UP (ref 0.5–1.3)
D DIMER BLD IA.RAPID-MCNC: 226 NG/ML DDU — SIGNIFICANT CHANGE UP
EGFR: 78 ML/MIN/1.73M2 — SIGNIFICANT CHANGE UP
EOSINOPHIL # BLD AUTO: 0.11 K/UL — SIGNIFICANT CHANGE UP (ref 0–0.5)
EOSINOPHIL NFR BLD AUTO: 1.3 % — SIGNIFICANT CHANGE UP (ref 0–6)
GLUCOSE SERPL-MCNC: 99 MG/DL — SIGNIFICANT CHANGE UP (ref 70–99)
HCT VFR BLD CALC: 46.5 % — SIGNIFICANT CHANGE UP (ref 39–50)
HGB BLD-MCNC: 15.9 G/DL — SIGNIFICANT CHANGE UP (ref 13–17)
IMM GRANULOCYTES NFR BLD AUTO: 0.1 % — SIGNIFICANT CHANGE UP (ref 0–0.9)
INR BLD: 1.08 RATIO — SIGNIFICANT CHANGE UP (ref 0.88–1.16)
LYMPHOCYTES # BLD AUTO: 2 K/UL — SIGNIFICANT CHANGE UP (ref 1–3.3)
LYMPHOCYTES # BLD AUTO: 23.4 % — SIGNIFICANT CHANGE UP (ref 13–44)
MCHC RBC-ENTMCNC: 30.5 PG — SIGNIFICANT CHANGE UP (ref 27–34)
MCHC RBC-ENTMCNC: 34.2 GM/DL — SIGNIFICANT CHANGE UP (ref 32–36)
MCV RBC AUTO: 89.1 FL — SIGNIFICANT CHANGE UP (ref 80–100)
MONOCYTES # BLD AUTO: 0.91 K/UL — HIGH (ref 0–0.9)
MONOCYTES NFR BLD AUTO: 10.6 % — SIGNIFICANT CHANGE UP (ref 2–14)
NEUTROPHILS # BLD AUTO: 5.51 K/UL — SIGNIFICANT CHANGE UP (ref 1.8–7.4)
NEUTROPHILS NFR BLD AUTO: 64.4 % — SIGNIFICANT CHANGE UP (ref 43–77)
PLATELET # BLD AUTO: 243 K/UL — SIGNIFICANT CHANGE UP (ref 150–400)
POTASSIUM SERPL-MCNC: 3.9 MMOL/L — SIGNIFICANT CHANGE UP (ref 3.5–5.3)
POTASSIUM SERPL-SCNC: 3.9 MMOL/L — SIGNIFICANT CHANGE UP (ref 3.5–5.3)
PROT SERPL-MCNC: 7.8 GM/DL — SIGNIFICANT CHANGE UP (ref 6–8.3)
PROTHROM AB SERPL-ACNC: 12.5 SEC — SIGNIFICANT CHANGE UP (ref 10.5–13.4)
RBC # BLD: 5.22 M/UL — SIGNIFICANT CHANGE UP (ref 4.2–5.8)
RBC # FLD: 13.9 % — SIGNIFICANT CHANGE UP (ref 10.3–14.5)
SODIUM SERPL-SCNC: 141 MMOL/L — SIGNIFICANT CHANGE UP (ref 135–145)
WBC # BLD: 8.56 K/UL — SIGNIFICANT CHANGE UP (ref 3.8–10.5)
WBC # FLD AUTO: 8.56 K/UL — SIGNIFICANT CHANGE UP (ref 3.8–10.5)

## 2023-07-23 PROCEDURE — 71045 X-RAY EXAM CHEST 1 VIEW: CPT | Mod: 26

## 2023-07-23 PROCEDURE — 36415 COLL VENOUS BLD VENIPUNCTURE: CPT

## 2023-07-23 PROCEDURE — 99284 EMERGENCY DEPT VISIT MOD MDM: CPT | Mod: FS

## 2023-07-23 PROCEDURE — 85379 FIBRIN DEGRADATION QUANT: CPT

## 2023-07-23 PROCEDURE — 93005 ELECTROCARDIOGRAM TRACING: CPT

## 2023-07-23 PROCEDURE — 93971 EXTREMITY STUDY: CPT | Mod: 26,LT

## 2023-07-23 PROCEDURE — 93010 ELECTROCARDIOGRAM REPORT: CPT

## 2023-07-23 PROCEDURE — 93971 EXTREMITY STUDY: CPT | Mod: LT

## 2023-07-23 PROCEDURE — 85025 COMPLETE CBC W/AUTO DIFF WBC: CPT

## 2023-07-23 PROCEDURE — 99285 EMERGENCY DEPT VISIT HI MDM: CPT | Mod: 25

## 2023-07-23 PROCEDURE — 71045 X-RAY EXAM CHEST 1 VIEW: CPT

## 2023-07-23 PROCEDURE — 80053 COMPREHEN METABOLIC PANEL: CPT

## 2023-07-23 PROCEDURE — 85610 PROTHROMBIN TIME: CPT

## 2023-07-23 NOTE — ED ADULT NURSE NOTE - OBJECTIVE STATEMENT
pt presents to the ED with left calf pain and left sided pain in lung? pt states that it feels like he needs to cough to clear his lung. pt is s/p cardiac cath without intervention 2 days ago and was sent home yesterday.

## 2023-07-23 NOTE — ED STATDOCS - OBJECTIVE STATEMENT
66 y/o male w/PMHx of HTN, HLD, depression, aortic aneurysm, GERD, TIA, multiple SBO in the past, PVCs presents to ED c/o left calf pain. pt s/p cardiac cath here at  2 days ago, was discharged home yesterday. This morning, pt developed left leg cram. Denies SOB at this time but reports increased sensation to cough. Endorsed chest tightness 3 nights ago and the subsequent morning. Pt on 81mg ASA.

## 2023-07-23 NOTE — ED STATDOCS - NS ED ATTENDING STATEMENT MOD
This was a shared visit with the CAMMY. I reviewed and verified the documentation and independently performed the documented:

## 2023-07-23 NOTE — ED STATDOCS - PHYSICAL EXAMINATION
Constitutional: NAD   Eyes: PERRLA  Head: Normocephalic   Mouth: MMM  Cardiac: regular rate   Resp: Lungs CTAB  GI: Abd s/nt/nd, no rebound or guarding.  MSK: +L calf pain minimal ttp, no erythema, NVI  Neuro: awake, alert, moving all extremities  Skin: No rashes

## 2023-07-23 NOTE — ED STATDOCS - NSFOLLOWUPINSTRUCTIONS_ED_ALL_ED_FT
FOLLOW UP WITH YOUR PRIMARY DOCTOR IN 1-2 DAYS. RETURN TO THE ER FOR ANY WORSENING SYMPTOMS OR NEW CONCERNS.     Musculoskeletal Pain  Musculoskeletal pain refers to aches and pains in your bones, joints, muscles, and the tissues that surround them. This pain can occur in any part of the body. It can last for a short time (acute) or a long time (chronic).  A physical exam, lab tests, and imaging studies may be done to find the cause of your musculoskeletal pain.  Follow these instructions at home:     Lifestyle     Try to control or lower your stress levels. Stress increases muscle tension and can worsen musculoskeletal pain. It is important to recognize when you are anxious or stressed and learn ways to manage it. This may include:  Meditation or yoga.Cognitive or behavioral therapy.Acupuncture or massage therapy.You may continue all activities unless the activities cause more pain. When the pain gets better, slowly resume your normal activities. Gradually increase the intensity and duration of your activities or exercise.Managing pain, stiffness, and swelling     Take over-the-counter and prescription medicines only as told by your health care provider.When your pain is severe, bed rest may be helpful. Lie or sit in any position that is comfortable, but get out of bed and walk around at least every couple of hours.If directed, apply heat to the affected area as often as told by your health care provider. Use the heat source that your health care provider recommends, such as a moist heat pack or a heating pad.  Place a towel between your skin and the heat source.Leave the heat on for 20–30 minutes.Remove the heat if your skin turns bright red. This is especially important if you are unable to feel pain, heat, or cold. You may have a greater risk of getting burned.If directed, put ice on the painful area.  Put ice in a plastic bag.Place a towel between your skin and the bag.Leave the ice on for 20 minutes, 2–3 times a day.General instructions     Your health care provider may recommend that you see a physical therapist. This person can help you come up with a safe exercise program. Do any exercises as told by your physical therapist.Keep all follow-up visits, including any physical therapy visits, as told by your health care providers. This is important.Contact a health care provider if:  Your pain gets worse.Medicines do not help ease your pain.You cannot use the part of your body that hurts, such as your arm, leg, or neck.You have trouble sleeping.You have trouble doing your normal activities.Get help right away if:  You have a new injury and your pain is worse or different.You feel numb or you have tingling in the painful area.Summary  Musculoskeletal pain refers to aches and pains in your bones, joints, muscles, and the tissues that surround them.This pain can occur in any part of the body.Your health care provider may recommend that you see a physical therapist. This person can help you come up with a safe exercise program. Do any exercises as told by your physical therapist.Lower your stress level. Stress can worsen musculoskeletal pain. Ways to lower stress may include meditation, yoga, cognitive or behavioral therapy, acupuncture, and massage therapy.This information is not intended to replace advice given to you by your health care provider. Make sure you discuss any questions you have with your health care provider.

## 2023-07-23 NOTE — ED ADULT TRIAGE NOTE - CHIEF COMPLAINT QUOTE
Pt presents to ED c/o left calf pain. pt s/p cardiac cath this past Friday, was discharged home yesterday. this morning developed calf pain. reports slight difficulty breathing. SPO2 94% in triage. EKG in progress

## 2023-07-23 NOTE — ED STATDOCS - PATIENT PORTAL LINK FT
You can access the FollowMyHealth Patient Portal offered by [facility] by registering at the following website: [email or web address]. By joining [employer]’s FollowMyHealth portal, you will also be able to view your health information using other applications (apps) compatible with our system.

## 2023-07-23 NOTE — ED STATDOCS - ATTENDING APP SHARED VISIT CONTRIBUTION OF CARE
I, Venita Houston MD, performed the initial face to face bedside interview with this patient regarding history of present illness, review of symptoms and relevant past medical, social and family history.  I completed an independent physical examination.  I was the initial provider who evaluated this patient. I have signed out the follow up of any pending tests (i.e. labs, radiological studies) to the ACP.  I have communicated the patient’s plan of care and disposition with the ACP.  The history, relevant review of systems, past medical and surgical history, medical decision making, and physical examination was documented by the scribe in my presence and I attest to the accuracy of the documentation.

## 2023-07-23 NOTE — ED STATDOCS - CLINICAL SUMMARY MEDICAL DECISION MAKING FREE TEXT BOX
68 y/o male recently had cardiac cath with L calf pain. feels like he has to cough. not actively SOB. speaking in full sentences. will obtain d-dimer, blood work, sono of LLE, and reassess closely. 68 y/o male recently had cardiac cath with L calf pain. feels like he has to cough. not actively SOB. speaking in full sentences. will obtain d-dimer, blood work, sono of LLE, and reassess closely.    signed Jigna Alvarez PA-C Pt seen and evaluated in intake by Dr Houston.   67M with recent cardiac cath and left calf pain and a mild urge to cough. No significant findings on labwork, sono, or cxr. The xray was personally reviewed by me. Outpt f/u PMD Pat Klein. return precautions given. Pt feeling well, pt and family agree with DC and plan of care.

## 2023-07-23 NOTE — ED STATDOCS - HIV OFFER
sore throat, difficulty breathing, difficulty swallowing, chest tightness, fatigue/FEVER/DECREASED EATING/DRINKING Previously Declined (within the last year)

## 2023-07-23 NOTE — ED STATDOCS - CARE PROVIDER_API CALL
Hong Klein  [facility]  [address], [address]  [place] [address]  Phone: [phone]  Fax: [phone]  Established Patient  Follow Up Time:

## 2023-07-24 ENCOUNTER — NON-APPOINTMENT (OUTPATIENT)
Age: 68
End: 2023-07-24

## 2023-07-24 ENCOUNTER — FORM ENCOUNTER (OUTPATIENT)
Age: 68
End: 2023-07-24

## 2023-07-27 ENCOUNTER — APPOINTMENT (OUTPATIENT)
Dept: FAMILY MEDICINE | Facility: CLINIC | Age: 68
End: 2023-07-27
Payer: MEDICARE

## 2023-07-27 VITALS
HEIGHT: 69 IN | TEMPERATURE: 97.9 F | OXYGEN SATURATION: 96 % | HEART RATE: 103 BPM | BODY MASS INDEX: 27.11 KG/M2 | WEIGHT: 183 LBS | SYSTOLIC BLOOD PRESSURE: 126 MMHG | DIASTOLIC BLOOD PRESSURE: 88 MMHG

## 2023-07-27 DIAGNOSIS — Z09 ENCOUNTER FOR FOLLOW-UP EXAMINATION AFTER COMPLETED TREATMENT FOR CONDITIONS OTHER THAN MALIGNANT NEOPLASM: ICD-10-CM

## 2023-07-27 DIAGNOSIS — R55 SYNCOPE AND COLLAPSE: ICD-10-CM

## 2023-07-27 PROCEDURE — 99495 TRANSJ CARE MGMT MOD F2F 14D: CPT

## 2023-07-27 NOTE — HISTORY OF PRESENT ILLNESS
[Admitted on: ___] : The patient was admitted on [unfilled] [Discharged on ___] : discharged on [unfilled] [Post-hospitalization from ___ Hospital] : Post-hospitalization from [unfilled] Hospital [Discharge Summary] : discharge summary [Discharge Med List] : discharge medication list [Med Reconciliation] : medication reconciliation has been completed [Patient Contacted By: ____] : and contacted by [unfilled] [FreeTextEntry2] : Hospital Course:\par 66 y/o male with HTN, HLD, hx of PVCs, aortic aneurysm, hx of TIA, GERD, remote hx of appendectomy, abdominal adhesions, episodes of SBO, presented to  ED with complaint of chest discomfort, started on the evening of 7/20, L sided, lasted under 20 minutes and then resolved. He went to bed and woke up on 7/21and the pain was back but less severe. He was scheduled to see Cardiology  in the office next week but called the office to alert them of the symptom and they referred him to the ED. In the ED, /96, other vitals WNL. Exam unrevealing. CBC, CMP normal. Troponin negative. BNP normal. EKG without acute ischemic changes or dysrhythmia. CXR clear. He was given aspirin and admitted to Medicine. \par \par Chest discomfort Since improved. No signs or symptoms of pneumonia. CXR clear. Reassuringly, troponin negative x2, EKG with no ischemic changes. Patient was seen and evaluated by Cardiology, taken for L heart cath 7/21. No occlusive CAD noted. Cardiology advises continued aspirin and statin, follow up as outpatient with Dr. Corrigan. Stable for ID home. \par \par Today's Visit:\par He was experiencing chest pains on Tuesday, he was advised by his cardio to go to the ED. At the hospital he was found to have bradycardic episodes. The cardio catheter was found to be normal; some calcification noted. He was discharged Saturday. Sunday morning he woke up to severe cramping in his left leg. He called the nurse help line through [employer] and went to the hospital; ruled out DVT. \par \par He has since had cardio followup. He was diagnosed with vasovagal syndrome due to chronic nausea. He has a h/o nausea for years. He's had episodes of nausea that comes and goes and occurs along chest pains/tightness..

## 2023-07-27 NOTE — HEALTH RISK ASSESSMENT
[0] : 2) Feeling down, depressed, or hopeless: Not at all (0) [PHQ-2 Negative - No further assessment needed] : PHQ-2 Negative - No further assessment needed [Former] : Former [[id number]] : 0

## 2023-07-27 NOTE — ASSESSMENT
[FreeTextEntry1] : I She Eubanks am scribing for the presence of Dr. Middleton the following sections HISTORY OF PRESENT ILLNESS, PAST MEDICAL/FAMILY/SOCIAL HISTORY; REVIEW OF SYSTEMS; VITAL SIGNS; PHYSICAL EXAM. \par \par  I personally performed the services described in the documentation, reviewed the documentation recorded by the scribe in my presence and it accurately and completely records my words and actions.\par

## 2023-08-05 NOTE — ASU PREOP CHECKLIST - DENTURES
no Bed in lowest position, wheels locked, appropriate side rails in place/Call bell, personal items and telephone in reach/Instruct patient to call for assistance before getting out of bed or chair/Non-slip footwear when patient is out of bed/[place] to call system/Physically safe environment - no spills, clutter or unnecessary equipment/Purposeful Proactive Rounding/Room/bathroom lighting operational, light cord in reach

## 2023-09-27 ENCOUNTER — APPOINTMENT (OUTPATIENT)
Dept: INTERNAL MEDICINE | Facility: CLINIC | Age: 68
End: 2023-09-27
Payer: MEDICARE

## 2023-09-27 VITALS
WEIGHT: 185 LBS | SYSTOLIC BLOOD PRESSURE: 121 MMHG | HEIGHT: 69 IN | BODY MASS INDEX: 27.4 KG/M2 | OXYGEN SATURATION: 96 % | HEART RATE: 73 BPM | TEMPERATURE: 98.3 F | RESPIRATION RATE: 18 BRPM | DIASTOLIC BLOOD PRESSURE: 85 MMHG

## 2023-09-27 DIAGNOSIS — G45.9 TRANSIENT CEREBRAL ISCHEMIC ATTACK, UNSPECIFIED: ICD-10-CM

## 2023-09-27 DIAGNOSIS — R94.8 ABNORMAL RESULTS OF FUNCTION STUDIES OF OTHER ORGANS AND SYSTEMS: ICD-10-CM

## 2023-09-27 DIAGNOSIS — I10 ESSENTIAL (PRIMARY) HYPERTENSION: ICD-10-CM

## 2023-09-27 DIAGNOSIS — Z87.891 PERSONAL HISTORY OF NICOTINE DEPENDENCE: ICD-10-CM

## 2023-09-27 DIAGNOSIS — E78.00 PURE HYPERCHOLESTEROLEMIA, UNSPECIFIED: ICD-10-CM

## 2023-09-27 PROCEDURE — 99204 OFFICE O/P NEW MOD 45 MIN: CPT | Mod: 25

## 2023-09-27 PROCEDURE — ZZZZZ: CPT

## 2023-09-27 PROCEDURE — 94729 DIFFUSING CAPACITY: CPT

## 2023-09-27 PROCEDURE — 94727 GAS DIL/WSHOT DETER LNG VOL: CPT

## 2023-09-27 PROCEDURE — 94060 EVALUATION OF WHEEZING: CPT

## 2023-09-27 RX ORDER — ALBUTEROL SULFATE 90 UG/1
108 (90 BASE) INHALANT RESPIRATORY (INHALATION)
Qty: 1 | Refills: 1 | Status: DISCONTINUED | COMMUNITY
Start: 2023-04-25 | End: 2023-09-27

## 2023-09-27 RX ORDER — BENZONATATE 200 MG/1
200 CAPSULE ORAL 3 TIMES DAILY
Qty: 21 | Refills: 0 | Status: DISCONTINUED | COMMUNITY
Start: 2023-04-25 | End: 2023-09-27

## 2023-09-27 RX ORDER — SILDENAFIL 20 MG/1
20 TABLET ORAL
Qty: 90 | Refills: 0 | Status: DISCONTINUED | COMMUNITY
Start: 2023-01-24 | End: 2023-09-27

## 2023-10-05 ENCOUNTER — APPOINTMENT (OUTPATIENT)
Dept: CT IMAGING | Facility: CLINIC | Age: 68
End: 2023-10-05
Payer: MEDICARE

## 2023-10-05 ENCOUNTER — OUTPATIENT (OUTPATIENT)
Dept: OUTPATIENT SERVICES | Facility: HOSPITAL | Age: 68
LOS: 1 days | End: 2023-10-05
Payer: MEDICARE

## 2023-10-05 DIAGNOSIS — Z90.49 ACQUIRED ABSENCE OF OTHER SPECIFIED PARTS OF DIGESTIVE TRACT: Chronic | ICD-10-CM

## 2023-10-05 DIAGNOSIS — K66.0 PERITONEAL ADHESIONS (POSTPROCEDURAL) (POSTINFECTION): Chronic | ICD-10-CM

## 2023-10-05 DIAGNOSIS — K56.69 OTHER INTESTINAL OBSTRUCTION: Chronic | ICD-10-CM

## 2023-10-05 DIAGNOSIS — Z98.890 OTHER SPECIFIED POSTPROCEDURAL STATES: Chronic | ICD-10-CM

## 2023-10-05 DIAGNOSIS — R94.8 ABNORMAL RESULTS OF FUNCTION STUDIES OF OTHER ORGANS AND SYSTEMS: ICD-10-CM

## 2023-10-05 DIAGNOSIS — K35.2 ACUTE APPENDICITIS WITH GENERALIZED PERITONITIS: Chronic | ICD-10-CM

## 2023-10-05 DIAGNOSIS — Z90.89 ACQUIRED ABSENCE OF OTHER ORGANS: Chronic | ICD-10-CM

## 2023-10-05 PROCEDURE — 71250 CT THORAX DX C-: CPT | Mod: 26,MH

## 2023-10-05 PROCEDURE — 71250 CT THORAX DX C-: CPT

## 2023-10-05 PROCEDURE — 70490 CT SOFT TISSUE NECK W/O DYE: CPT | Mod: 26,MH

## 2023-10-05 PROCEDURE — 70490 CT SOFT TISSUE NECK W/O DYE: CPT

## 2023-10-11 ENCOUNTER — NON-APPOINTMENT (OUTPATIENT)
Age: 68
End: 2023-10-11

## 2023-11-22 NOTE — ED ADULT NURSE NOTE - NS ED NURSE NOTIFICATIONS
Patient in today complaining of arthritis pain with muscle spasms. Patient requested injections. Injections v.o. approved per Dr. ADAMS.      Family notified

## 2023-12-18 DIAGNOSIS — F32.A DEPRESSION, UNSPECIFIED: ICD-10-CM

## 2023-12-18 DIAGNOSIS — R07.89 OTHER CHEST PAIN: ICD-10-CM

## 2023-12-18 DIAGNOSIS — Z82.49 FAMILY HISTORY OF ISCHEMIC HEART DISEASE AND OTHER DISEASES OF THE CIRCULATORY SYSTEM: ICD-10-CM

## 2023-12-18 DIAGNOSIS — R53.83 OTHER FATIGUE: ICD-10-CM

## 2023-12-18 DIAGNOSIS — Z88.1 ALLERGY STATUS TO OTHER ANTIBIOTIC AGENTS STATUS: ICD-10-CM

## 2023-12-18 DIAGNOSIS — Z87.891 PERSONAL HISTORY OF NICOTINE DEPENDENCE: ICD-10-CM

## 2023-12-18 DIAGNOSIS — K21.9 GASTRO-ESOPHAGEAL REFLUX DISEASE WITHOUT ESOPHAGITIS: ICD-10-CM

## 2023-12-18 DIAGNOSIS — I10 ESSENTIAL (PRIMARY) HYPERTENSION: ICD-10-CM

## 2023-12-18 DIAGNOSIS — Z88.8 ALLERGY STATUS TO OTHER DRUGS, MEDICAMENTS AND BIOLOGICAL SUBSTANCES: ICD-10-CM

## 2023-12-18 DIAGNOSIS — Z86.79 PERSONAL HISTORY OF OTHER DISEASES OF THE CIRCULATORY SYSTEM: ICD-10-CM

## 2023-12-18 DIAGNOSIS — Z79.82 LONG TERM (CURRENT) USE OF ASPIRIN: ICD-10-CM

## 2023-12-18 DIAGNOSIS — E78.5 HYPERLIPIDEMIA, UNSPECIFIED: ICD-10-CM

## 2023-12-18 DIAGNOSIS — Z86.16 PERSONAL HISTORY OF COVID-19: ICD-10-CM

## 2023-12-22 DIAGNOSIS — I20.0 UNSTABLE ANGINA: ICD-10-CM

## 2024-02-09 ENCOUNTER — OUTPATIENT (OUTPATIENT)
Dept: OUTPATIENT SERVICES | Facility: HOSPITAL | Age: 69
LOS: 1 days | End: 2024-02-09
Payer: MEDICARE

## 2024-02-09 ENCOUNTER — APPOINTMENT (OUTPATIENT)
Dept: CT IMAGING | Facility: CLINIC | Age: 69
End: 2024-02-09
Payer: MEDICARE

## 2024-02-09 DIAGNOSIS — K56.69 OTHER INTESTINAL OBSTRUCTION: Chronic | ICD-10-CM

## 2024-02-09 DIAGNOSIS — K35.2 ACUTE APPENDICITIS WITH GENERALIZED PERITONITIS: Chronic | ICD-10-CM

## 2024-02-09 DIAGNOSIS — Z90.89 ACQUIRED ABSENCE OF OTHER ORGANS: Chronic | ICD-10-CM

## 2024-02-09 DIAGNOSIS — Z98.890 OTHER SPECIFIED POSTPROCEDURAL STATES: Chronic | ICD-10-CM

## 2024-02-09 DIAGNOSIS — Z90.49 ACQUIRED ABSENCE OF OTHER SPECIFIED PARTS OF DIGESTIVE TRACT: Chronic | ICD-10-CM

## 2024-02-09 DIAGNOSIS — K66.0 PERITONEAL ADHESIONS (POSTPROCEDURAL) (POSTINFECTION): Chronic | ICD-10-CM

## 2024-02-09 DIAGNOSIS — I77.810 THORACIC AORTIC ECTASIA: ICD-10-CM

## 2024-02-09 PROCEDURE — 71275 CT ANGIOGRAPHY CHEST: CPT | Mod: 26,MH

## 2024-02-09 PROCEDURE — 71275 CT ANGIOGRAPHY CHEST: CPT

## 2024-02-15 ENCOUNTER — NON-APPOINTMENT (OUTPATIENT)
Age: 69
End: 2024-02-15

## 2024-03-12 ENCOUNTER — APPOINTMENT (OUTPATIENT)
Dept: CARDIOTHORACIC SURGERY | Facility: CLINIC | Age: 69
End: 2024-03-12
Payer: MEDICARE

## 2024-03-12 VITALS
HEART RATE: 81 BPM | WEIGHT: 185 LBS | BODY MASS INDEX: 27.4 KG/M2 | SYSTOLIC BLOOD PRESSURE: 132 MMHG | RESPIRATION RATE: 16 BRPM | TEMPERATURE: 98.4 F | OXYGEN SATURATION: 96 % | DIASTOLIC BLOOD PRESSURE: 85 MMHG | HEIGHT: 69 IN

## 2024-03-12 DIAGNOSIS — I77.810 THORACIC AORTIC ECTASIA: ICD-10-CM

## 2024-03-12 PROCEDURE — 99203 OFFICE O/P NEW LOW 30 MIN: CPT

## 2024-03-12 NOTE — PHYSICAL EXAM
[General Appearance - Alert] : alert [General Appearance - In No Acute Distress] : in no acute distress [General Appearance - Well Nourished] : well nourished [General Appearance - Well Developed] : well developed [General Appearance - Well-Appearing] : healthy appearing [Sclera] : the sclera and conjunctiva were normal [Outer Ear] : the ears and nose were normal in appearance [Neck Appearance] : the appearance of the neck was normal [Neck Cervical Mass (___cm)] : no neck mass was observed [] : no respiratory distress [Respiration, Rhythm And Depth] : normal respiratory rhythm and effort [Auscultation Breath Sounds / Voice Sounds] : lungs were clear to auscultation bilaterally [Heart Rate And Rhythm] : heart rate was normal and rhythm regular [Heart Sounds] : normal S1 and S2 [2+] : left 2+ [Bowel Sounds] : normal bowel sounds [Abnormal Walk] : normal gait [Skin Color & Pigmentation] : normal skin color and pigmentation [Skin Turgor] : normal skin turgor [No Focal Deficits] : no focal deficits [Oriented To Time, Place, And Person] : oriented to person, place, and time [Impaired Insight] : insight and judgment were intact [Affect] : the affect was normal [Mood] : the mood was normal [Memory Recent] : recent memory was not impaired [Memory Remote] : remote memory was not impaired [FreeTextEntry2] : no edema [FreeTextEntry1] : left  lateral small lymph node lower neck

## 2024-03-12 NOTE — ASSESSMENT
[FreeTextEntry1] : I had the pleasure of seeing Mr. ALYCIA PIERSON in the office today to evaluate the status of his aortic aneurysm.  Briefly, this is a 68 year old male with a pertinent medical history of hypertension, hyperlipidemia, gastroesophageal reflux disease, TIA, small bowel obstruction who has been followed by our office for an ascending aortic aneurysm since 2018. All imaging studies and tests have been thoroughly reviewed.  Today we discussed the disease process with regards to Mr. PIERSON's ascending aortic aneurysm. This is not something that we can change or reduce in size medically but something that we monitor for the long term, assessing for growth in total size and rate of change.   The current measurement of this patient's ascending aorta is 4.3 cm, which we do not yet consider an indication for surgery. Surgery is generally indicated when the aortic size is closer to 5.5 cm for a trileaflet aortic valve.  Protective strategies were discussed with Mr. FORD include close blood pressure management and prevention of very heavy or prolonged lifting.   We will be continuing the use of the aortic registry and he is to follow up with the next surveillance CT scan chest with IV contrast in 2 years. The patient was in full understanding of and in agreement with plan going forward. All questions answered and concerns were addressed.  Plan: -  Use of a medication within the class of angiotensin receptor blockers, such as Losartan, should be considered for patients with an aortic aneurysm. In addition to anti-hypertensive effects, studies show that it also works on a subcellular level to slow the rate of aortic dilation.  - CTA chest in 2 years - Aortic registry - Return to care in our office after follow up imaging completed - Cardiology follow-up for hypertension with Dr. Gaye Corrigan  I, Dr. Rober Arnett, personally performed the evaluation and management (E/M) services for this new patient.  That E/M includes conducting the initial examination, assessing all conditions, and establishing the plan of care.  Today, Juan Savage PA-C, was here to observe my evaluation and management services for this patient to be followed going forward.

## 2024-03-12 NOTE — CONSULT LETTER
[Dear  ___] : Dear  [unfilled], [Consult Letter:] : I had the pleasure of evaluating your patient, [unfilled]. [( Thank you for referring [unfilled] for consultation for _____ )] : Thank you for referring [unfilled] for consultation for [unfilled] [Please see my note below.] : Please see my note below. [Consult Closing:] : Thank you very much for allowing me to participate in the care of this patient.  If you have any questions, please do not hesitate to contact me. [Sincerely,] : Sincerely, [FreeTextEntry2] : Gaye Corrigan MD [FreeTextEntry3] : Rober Arnett MD , Cardiothoracic Surgery , Cardiovascular and Thoracic Surgery [facility], [facility]

## 2024-03-12 NOTE — HISTORY OF PRESENT ILLNESS
[FreeTextEntry1] : Mr. PIERSON is a 68 year old male referred by Dr. Gaye Corrigan who presents for consultation of an aortic aneurysm. He has been seen in the past (2018) but presents in person again today with a recent scan as it has been a while.   His past medical history includes HTN, severe GERD, vasovagal syndrome, TIA, paroxysmal SVT, prostate cancer with radical prostatectomy 2023, small bowel obstruction (x3) with exploratory laparotomy, ruptured appendectomy (10 years old), laparoscopic lysis of adhesions.  He presents to the office today for Aortic Registry evaluation.  Today he reports some back pain but testing showed degenerative changes to the spine. He has some chest pressure that he attributes to his severe gastroesophageal reflux disease and baseline nausea from his abdominal issues and scar tissue. Patient denies chest pain, palpitations, shortness of breath, cough, fevers, chills, fatigue and unintentional weight loss or gain.   His mother had her aortic valve and had an aortic aneurysm as well (later in life).

## 2024-03-12 NOTE — DATA REVIEWED
[FreeTextEntry1] : CT ANGIO CHEST AORTA [facility] PROCEDURE DATE:  02/09/2024 INTERPRETATION:  EXAMINATION: CT ANGIO CHEST AORTA  CLINICAL INDICATION: aortic dilation;  TECHNIQUE: CTA of the chest was performed after the administration of 90 cc administered of Omnipaque 350, 10 cc discarded.  MIP images were reconstructed.  COMPARISON: 10.5.23.  FINDINGS:  AIRWAYS AND LUNGS: The central tracheobronchial tree is patent.  The lungs are clear.  MEDIASTINUM AND PLEURA: There are no enlarged mediastinal, hilar or axillary lymph nodes. The visualized portion of the thyroid gland is unremarkable. There is no pleural effusion. There is no pneumothorax.  HEART AND VESSELS: The heart is normal in size.   There are atherosclerotic calcifications of the aorta and coronary arteries.  There is no pericardial effusion.  Aortic root measures 4.3 cm, unchanged. Ascending aorta measures 4.1 cm, unchanged  UPPER ABDOMEN: Images of the upper abdomen demonstrate hepatic cysts and other hypodensities are too small to characterize.  BONES AND SOFT TISSUES: There are mild degenerative changes of the spine.  The soft tissues are unremarkable.  IMPRESSION: Stable exam    Transthoracic Echocardiogram from 09/16/22 LVEF 60-65% - RV and LV normal - LA and RA normal - The aortic valve is trileaflet. There is no aortic stenosis. There is trace AI - There is trace mitral valve regurgitation - There is evidence of dilation in the ascending aorta which is mild

## 2024-03-22 ENCOUNTER — APPOINTMENT (OUTPATIENT)
Dept: FAMILY MEDICINE | Facility: CLINIC | Age: 69
End: 2024-03-22
Payer: MEDICARE

## 2024-03-22 VITALS
SYSTOLIC BLOOD PRESSURE: 124 MMHG | HEIGHT: 69 IN | BODY MASS INDEX: 27.4 KG/M2 | OXYGEN SATURATION: 98 % | DIASTOLIC BLOOD PRESSURE: 80 MMHG | TEMPERATURE: 97.6 F | HEART RATE: 57 BPM | WEIGHT: 185 LBS

## 2024-03-22 DIAGNOSIS — K40.90 UNILATERAL INGUINAL HERNIA, W/OUT OBSTRUCTION OR GANGRENE, NOT SPECIFIED AS RECURRENT: ICD-10-CM

## 2024-03-22 PROCEDURE — 99213 OFFICE O/P EST LOW 20 MIN: CPT

## 2024-03-22 NOTE — PLAN
[FreeTextEntry1] : Follow up with Dr. Dawson to discuss hernia repair.  Will see him back for a preop visit if necessary.  Reviewed signs of incarceration (change in bowel habits, blood in stool, irreducible hernia, etc.). Instructed patient to immediately follow up in the office or to go to the nearest ER if he develops any of these symptoms.

## 2024-03-22 NOTE — ASSESSMENT
[FreeTextEntry1] : His exam reveals a reducible left inguinal hernia. He has seen Dr. Dawson for evaluation of a possible Spigelian hernia and would like to see him again.

## 2024-03-22 NOTE — PHYSICAL EXAM
[No Respiratory Distress] : no respiratory distress  [No Focal Deficits] : no focal deficits [Normal] : affect was normal and insight and judgment were intact [Soft] : abdomen soft [de-identified] : +left inguinal hernia, reducible

## 2024-03-22 NOTE — HISTORY OF PRESENT ILLNESS
[FreeTextEntry8] : Patient presents with a lump "on his pubic bone". He first noticed it this morning. He has a history of bowel obstruction in the past and has recently been having a flare up of his GI symptoms. He was on a liquid diet for a while but returned to a solid diet a few days ago. He had corned beef for dinner last night. He has a history of prostatectomy and had a lot of scar tissue after this. He wonders if the lump he found could be a lymph node. It is tender to touch but not excruciating.

## 2024-03-23 ENCOUNTER — EMERGENCY (EMERGENCY)
Facility: HOSPITAL | Age: 69
LOS: 0 days | Discharge: HOME | End: 2024-03-23
Attending: EMERGENCY MEDICINE
Payer: MEDICARE

## 2024-03-23 ENCOUNTER — TRANSCRIPTION ENCOUNTER (OUTPATIENT)
Age: 69
End: 2024-03-23

## 2024-03-23 VITALS
RESPIRATION RATE: 18 BRPM | TEMPERATURE: 98 F | HEIGHT: 69 IN | WEIGHT: 184.97 LBS | OXYGEN SATURATION: 97 % | DIASTOLIC BLOOD PRESSURE: 92 MMHG | HEART RATE: 73 BPM | SYSTOLIC BLOOD PRESSURE: 152 MMHG

## 2024-03-23 DIAGNOSIS — Z90.89 ACQUIRED ABSENCE OF OTHER ORGANS: Chronic | ICD-10-CM

## 2024-03-23 DIAGNOSIS — K35.2 ACUTE APPENDICITIS WITH GENERALIZED PERITONITIS: Chronic | ICD-10-CM

## 2024-03-23 DIAGNOSIS — Z98.890 OTHER SPECIFIED POSTPROCEDURAL STATES: Chronic | ICD-10-CM

## 2024-03-23 DIAGNOSIS — Z90.49 ACQUIRED ABSENCE OF OTHER SPECIFIED PARTS OF DIGESTIVE TRACT: Chronic | ICD-10-CM

## 2024-03-23 DIAGNOSIS — R10.9 UNSPECIFIED ABDOMINAL PAIN: ICD-10-CM

## 2024-03-23 DIAGNOSIS — K40.90 UNILATERAL INGUINAL HERNIA, WITHOUT OBSTRUCTION OR GANGRENE, NOT SPECIFIED AS RECURRENT: ICD-10-CM

## 2024-03-23 DIAGNOSIS — K66.0 PERITONEAL ADHESIONS (POSTPROCEDURAL) (POSTINFECTION): Chronic | ICD-10-CM

## 2024-03-23 DIAGNOSIS — E78.5 HYPERLIPIDEMIA, UNSPECIFIED: ICD-10-CM

## 2024-03-23 DIAGNOSIS — Z88.1 ALLERGY STATUS TO OTHER ANTIBIOTIC AGENTS STATUS: ICD-10-CM

## 2024-03-23 DIAGNOSIS — Z87.19 PERSONAL HISTORY OF OTHER DISEASES OF THE DIGESTIVE SYSTEM: ICD-10-CM

## 2024-03-23 DIAGNOSIS — F32.A DEPRESSION, UNSPECIFIED: ICD-10-CM

## 2024-03-23 DIAGNOSIS — Z90.79 ACQUIRED ABSENCE OF OTHER GENITAL ORGAN(S): ICD-10-CM

## 2024-03-23 DIAGNOSIS — Z86.73 PERSONAL HISTORY OF TRANSIENT ISCHEMIC ATTACK (TIA), AND CEREBRAL INFARCTION WITHOUT RESIDUAL DEFICITS: ICD-10-CM

## 2024-03-23 DIAGNOSIS — Z88.8 ALLERGY STATUS TO OTHER DRUGS, MEDICAMENTS AND BIOLOGICAL SUBSTANCES: ICD-10-CM

## 2024-03-23 DIAGNOSIS — I10 ESSENTIAL (PRIMARY) HYPERTENSION: ICD-10-CM

## 2024-03-23 DIAGNOSIS — K21.9 GASTRO-ESOPHAGEAL REFLUX DISEASE WITHOUT ESOPHAGITIS: ICD-10-CM

## 2024-03-23 DIAGNOSIS — K56.69 OTHER INTESTINAL OBSTRUCTION: Chronic | ICD-10-CM

## 2024-03-23 PROCEDURE — 99282 EMERGENCY DEPT VISIT SF MDM: CPT

## 2024-03-23 PROCEDURE — 99283 EMERGENCY DEPT VISIT LOW MDM: CPT | Mod: FS

## 2024-03-23 NOTE — ED STATDOCS - NSFOLLOWUPINSTRUCTIONS_ED_ALL_ED_FT
Inguinal Hernia, Adult    An inguinal hernia develops when fat or the intestines push through a weak spot in a muscle where the leg meets the lower abdomen (groin). This creates a bulge. This kind of hernia could also be:  In the scrotum, if you are male.  In folds of skin around the vagina, if you are female.  There are three types of inguinal hernias:  Hernias that can be pushed back into the abdomen (are reducible). This type rarely causes pain.  Hernias that are not reducible (are incarcerated).  Hernias that are not reducible and lose their blood supply (are strangulated). This type of hernia requires emergency surgery.  What are the causes?  This condition is caused by having a weak spot in the muscles or tissues in your groin. This develops over time. The hernia may poke through the weak spot when you suddenly strain your lower abdominal muscles, such as when you:  Lift a heavy object.  Strain to have a bowel movement. Constipation can lead to straining.  Cough.  What increases the risk?  This condition is more likely to develop in:  Males.  Pregnant females.  People who:  Are overweight.  Work in jobs that require long periods of standing or heavy lifting.  Have had an inguinal hernia before.  Smoke or have lung disease. These factors can lead to long-term (chronic) coughing.  What are the signs or symptoms?  Symptoms may depend on the size of the hernia. Often, a small inguinal hernia has no symptoms. Symptoms of a larger hernia may include:  A bulge in the groin area. This is easier to see when standing. It might not be visible when lying down.  Pain or burning in the groin. This may get worse when lifting, straining, or coughing.  A dull ache or a feeling of pressure in the groin.  An unusual bulge in the scrotum, in males.  Symptoms of a strangulated inguinal hernia may include:  A bulge in your groin that is very painful and tender to the touch.  A bulge that turns red or purple.  Fever, nausea, and vomiting.  Inability to have a bowel movement or to pass gas.  How is this diagnosed?  This condition is diagnosed based on your symptoms, your medical history, and a physical exam. Your health care provider may feel your groin area and ask you to cough.    How is this treated?  Treatment depends on the size of your hernia and whether you have symptoms. If you do not have symptoms, your health care provider may have you watch your hernia carefully and have you come in for follow-up visits. If your hernia is large or if you have symptoms, you may need surgery to repair the hernia.    Follow these instructions at home:  Lifestyle    Avoid lifting heavy objects.  Avoid standing for long periods of time.  Do not use any products that contain nicotine or tobacco. These products include cigarettes, chewing tobacco, and vaping devices, such as e-cigarettes. If you need help quitting, ask your health care provider.  Maintain a healthy weight.  Preventing constipation    You may need to take these actions to prevent or treat constipation:  Drink enough fluid to keep your urine pale yellow.  Take over-the-counter or prescription medicines.  Eat foods that are high in fiber, such as beans, whole grains, and fresh fruits and vegetables.  Limit foods that are high in fat and processed sugars, such as fried or sweet foods.  General instructions    You may try to push the hernia back in place by very gently pressing on it while lying down. Do not try to force the bulge back in if it will not push in easily.  Watch your hernia for any changes in shape, size, or color. Get help right away if you notice any changes.  Take over-the-counter and prescription medicines only as told by your health care provider.  Keep all follow-up visits. This is important.  Contact a health care provider if:  You have a fever or chills.  You develop new symptoms.  Your symptoms get worse.  Get help right away if:  You have pain in your groin that suddenly gets worse.  You have a bulge in your groin that:  Suddenly gets bigger and does not get smaller.  Becomes red or purple or painful to the touch.  You are a man and you have a sudden pain in your scrotum, or the size of your scrotum suddenly changes.  You cannot push the hernia back in place by very gently pressing on it when you are lying down.  You have nausea or vomiting that does not go away.  You have a fast heartbeat.  You cannot have a bowel movement or pass gas.  These symptoms may represent a serious problem that is an emergency. Do not wait to see if the symptoms will go away. Get medical help right away. Call your local emergency services (911 in the [place]    Summary  An inguinal hernia develops when fat or the intestines push through a weak spot in a muscle where your leg meets your lower abdomen (groin).  This condition is caused by having a weak spot in muscles or tissues in your groin.  Symptoms may depend on the size of the hernia, and they may include pain or swelling in your groin. A small inguinal hernia often has no symptoms.  Treatment may not be needed if you do not have symptoms. If you have symptoms or a large hernia, you may need surgery to repair the hernia.  Avoid lifting heavy objects. Also, avoid standing for long periods of time.  This information is not intended to replace advice given to you by your health care provider. Make sure you discuss any questions you have with your health care provider.

## 2024-03-23 NOTE — ED STATDOCS - PROGRESS NOTE DETAILS
Attending in intake examined patient.  Reducible hernia.  Pt. with strict return precautions Has follow up with Dr. Rivera next week.  Katiuska Disla PA-C Pt. is a 68 year old male Hx HLD, depression, HTN, , aortic aneurysm, GERD, TIA, multiple SBO in the past, PVCs, and prostatectomy last year presents to the ED c/o worsening abd pain.  Pt. with inguinal hernia diagnosed at urgent care yesterday.  Pt. with nausea.  PT. did eat today.  NEg. F/C/S or vomiting.  Katiuska Disla PA-C

## 2024-03-23 NOTE — ED ADULT TRIAGE NOTE - CHIEF COMPLAINT QUOTE
pt presents to Ed with complaints of inguinal hernia diagnosed by PMD yesterday. pt endorses pain to epigastric region and nausea. told to come to ED by PMD Camille. pt has surgical consult with MD Loyd Wednesday.

## 2024-03-23 NOTE — ED STATDOCS - OBJECTIVE STATEMENT
69 y/o male w/ a PMHx of HTN, HLD, depression, aortic aneurysm, GERD, TIA, multiple SBO in the past, PVCs, and prostatectomy last year presents to the ED c/o worsening abd pain. Pt reports he was found to have an inguinal hernia at  yesterday and was told to go the ER if he has worsening nausea and pain, which he has. Pt only had a little bit of breakfast at 9 am. Denies fevers, chills, vomiting, diarrhea, CP, SOB, or recent illness. Pt has an appt w/ Dr. Loyd for a surgical consult however is having persistent pain. Pt able to reduce hernia. No other complaints at this time. Allergies: Erythromycin, Zoloft. PCP: Dr. Klein.

## 2024-03-23 NOTE — ED STATDOCS - PATIENT PORTAL LINK FT
You can access the FollowMyHealth Patient Portal offered by [facility] by registering at the following website: [email or web address]. By joining [facility] [employer]’s FollowMyHealth portal, you will also be able to view your health information using other applications (apps) compatible with our system.

## 2024-03-23 NOTE — ED ADULT NURSE NOTE - NSSEPSISSUSPECTED_ED_A_ED
Pt has been very anxious for last few weeks, despite taking her normal rescue meds. Pt had 3 syncopal episodes associated with her panic attacks today.      Triage Assessment     Row Name 11/17/22 1240       Triage Assessment (Adult)    Airway WDL WDL       Respiratory WDL    Respiratory WDL WDL       Skin Circulation/Temperature WDL    Skin Circulation/Temperature WDL WDL       Cardiac WDL    Cardiac WDL WDL       Peripheral/Neurovascular WDL    Peripheral Neurovascular WDL WDL       Cognitive/Neuro/Behavioral WDL    Cognitive/Neuro/Behavioral WDL X    Mood/Behavior anxious       [place] Coma Scale    Best Eye Response 4-->(E4) spontaneous    Best Motor Response 6-->(M6) obeys commands    Best Verbal Response 5-->(V5) oriented    [place] Coma Scale Score 15              
No

## 2024-03-23 NOTE — ED STATDOCS - CLINICAL SUMMARY MEDICAL DECISION MAKING FREE TEXT BOX
69 y/o pt w/ left groin pain. Pt has surgery follow up for next week. left inguinal hernia, easily reducible, nontender. Plan to d/c w/ surgical follow up. 69 y/o pt w/ left groin pain. Pt has surgery follow up for next week. left inguinal hernia, easily reducible, nontender. Plan to d/c w/ surgical follow up.          Attending in intake examined patient.  Reducible hernia.  Pt. with strict return precautions Has follow up with Dr. Rivera next week.  Katiuska Disla PA-C

## 2024-03-23 NOTE — ED STATDOCS - ATTENDING APP SHARED VISIT CONTRIBUTION OF CARE
I, Ramonita uHerta MD,  performed the initial face to face bedside interview with this patient regarding history of present illness, review of symptoms and relevant past medical, social and family history.  I completed an independent physical examination.  I was the initial provider who evaluated this patient.   I personally saw the patient and performed a substantive portion of the visit including all aspects of the medical decision making.  I have signed out the follow up of any pending tests (i.e. labs, radiological studies) to the [facility].  I have communicated the patient’s plan of care and disposition with the [facility].  The history, relevant review of systems, past medical and surgical history, medical decision making, and physical examination was documented by the scribe in my presence and I attest to the accuracy of the documentation.

## 2024-03-25 ENCOUNTER — APPOINTMENT (OUTPATIENT)
Dept: SURGERY | Facility: CLINIC | Age: 69
End: 2024-03-25

## 2024-03-27 ENCOUNTER — APPOINTMENT (OUTPATIENT)
Dept: SURGERY | Facility: CLINIC | Age: 69
End: 2024-03-27
Payer: MEDICARE

## 2024-03-27 VITALS
WEIGHT: 189 LBS | SYSTOLIC BLOOD PRESSURE: 135 MMHG | HEIGHT: 68.5 IN | BODY MASS INDEX: 28.32 KG/M2 | OXYGEN SATURATION: 95 % | TEMPERATURE: 98.3 F | RESPIRATION RATE: 16 BRPM | HEART RATE: 55 BPM | DIASTOLIC BLOOD PRESSURE: 73 MMHG

## 2024-03-27 DIAGNOSIS — R14.0 ABDOMINAL DISTENSION (GASEOUS): ICD-10-CM

## 2024-03-27 PROCEDURE — 99205 OFFICE O/P NEW HI 60 MIN: CPT

## 2024-03-27 NOTE — PHYSICAL EXAM
[JVD] : no jugular venous distention  [Purpura] : no purpura  [Petechiae] : no petechiae [Skin Induration] : no induration [Skin Ulcer] : no ulcer [Alert] : alert [Oriented to Person] : oriented to person [Oriented to Place] : oriented to place [Calm] : calm [Oriented to Time] : oriented to time [de-identified] : NC/AT PERRL EOMI  [de-identified] : non toxic, in no acute distress, [de-identified] : trachea midline, no gross mass  [de-identified] : no audible wheezing or stridor  [de-identified] : protuberant with no localizing tenderness, no guarding, no rebound  [de-identified] : phallus normal, no testicular mass or tenderness  [de-identified] : FROM of all extremities with no gross angulation or deformity, there is no right inguinal hernia, no Spigelian hernia, there is a reducible nontender left inguinal hernia  [de-identified] : numerous surgical scars consistent with prior surgical history  [de-identified] : mood is calm

## 2024-03-27 NOTE — DATA REVIEWED
[FreeTextEntry1] : Independent review of the abd/pel CT scan reveals a left inguinal hernia, there is no clear Spigelian hernia, the right lower abdominal wall has a gap in the transversalis and internal oblique muscles, but the external oblique is intact.

## 2024-03-27 NOTE — ASSESSMENT
[FreeTextEntry1] : The patient is a 68-year-old male with an extensive surgical history with a left inguinal hernia.  There is no clear radiographic or clinical evidence of Spigelian hernia.  The risks, benefits, and alternatives including the option of doing nothing to a robotic, possible, laparoscopic and possible open bilateral inguinal hernia repair with mesh, and open umbilical hernia repair were discussed.  The potential complications including but not limited to infection, bleeding, hernia recurrence, chronic post-operative pain, and seroma formation were discussed.  The patient was educated regarding the signs and symptoms of hernia strangulation and advised to seek immediate MD evaluation should this occur.  The patient understands and was advised that given his prior surgical history I would only recommend an open approach to the hernia. We will manage conservatively at this time; he has been advised to lose weight to offload the tension of the abdominal wall.   The patient will embark on a weight loss routine and follow up in 6 weeks to reassess the hernia. A total of 60 minutes was spent coordinating the patient's care.

## 2024-03-27 NOTE — HISTORY OF PRESENT ILLNESS
[de-identified] : The patient comes to the office in consultation by Dr. Hong Klein for evaluation of a Spigelian hernia and left inguinal hernia.  The patient reports that he has a long standing history of repeated bowel obstruction from adhesions.  He has a complicated surgical history that includes a radical prostatectomy, laparotomy for perforated appendicitis, and numerous explorations for SBO.  The patient states he felt a lump in the left groin and was noted to have a hernia. He had a CT scan that was read initially as a Spigelian hernia, and was to have surgery at [facility] until review of the films and chart revealed no hernia.

## 2024-03-27 NOTE — CONSULT LETTER
[Dear  ___] : Dear  [unfilled], [Consult Letter:] : I had the pleasure of evaluating your patient, [unfilled]. [Consult Closing:] : Thank you very much for allowing me to participate in the care of this patient.  If you have any questions, please do not hesitate to contact me. [Please see my note below.] : Please see my note below. [Sincerely,] : Sincerely, [FreeTextEntry3] : Jason Loyd MD, FACS   Department of Surgery [facility]

## 2024-04-30 ENCOUNTER — APPOINTMENT (OUTPATIENT)
Dept: FAMILY MEDICINE | Facility: CLINIC | Age: 69
End: 2024-04-30
Payer: MEDICARE

## 2024-04-30 VITALS
BODY MASS INDEX: 27.27 KG/M2 | TEMPERATURE: 97.3 F | HEIGHT: 68.5 IN | SYSTOLIC BLOOD PRESSURE: 138 MMHG | WEIGHT: 182 LBS | DIASTOLIC BLOOD PRESSURE: 88 MMHG | OXYGEN SATURATION: 96 % | HEART RATE: 64 BPM

## 2024-04-30 DIAGNOSIS — G47.9 SLEEP DISORDER, UNSPECIFIED: ICD-10-CM

## 2024-04-30 DIAGNOSIS — K40.90 UNILATERAL INGUINAL HERNIA, W/OUT OBSTRUCTION OR GANGRENE, NOT SPECIFIED AS RECURRENT: ICD-10-CM

## 2024-04-30 DIAGNOSIS — Z01.818 ENCOUNTER FOR OTHER PREPROCEDURAL EXAMINATION: ICD-10-CM

## 2024-04-30 PROCEDURE — 36415 COLL VENOUS BLD VENIPUNCTURE: CPT

## 2024-04-30 PROCEDURE — 99213 OFFICE O/P EST LOW 20 MIN: CPT

## 2024-04-30 RX ORDER — LOSARTAN POTASSIUM 25 MG/1
25 TABLET, FILM COATED ORAL
Refills: 0 | Status: ACTIVE | COMMUNITY

## 2024-04-30 RX ORDER — AMLODIPINE BESYLATE 5 MG/1
5 TABLET ORAL
Refills: 0 | Status: COMPLETED | COMMUNITY
End: 2024-04-30

## 2024-04-30 RX ORDER — DOXAZOSIN 1 MG/1
1 TABLET ORAL
Refills: 0 | Status: COMPLETED | COMMUNITY
End: 2024-04-30

## 2024-04-30 NOTE — HISTORY OF PRESENT ILLNESS
[No Pertinent Cardiac History] : no history of aortic stenosis, atrial fibrillation, coronary artery disease, recent myocardial infarction, or implantable device/pacemaker [No Pertinent Pulmonary History] : no history of asthma, COPD, sleep apnea, or smoking [No Adverse Anesthesia Reaction] : no adverse anesthesia reaction in self or family member [Chronic Anticoagulation] : chronic anticoagulation [(Patient denies any chest pain, claudication, dyspnea on exertion, orthopnea, palpitations or syncope)] : Patient denies any chest pain, claudication, dyspnea on exertion, orthopnea, palpitations or syncope [Chronic Kidney Disease] : no chronic kidney disease [Diabetes] : no diabetes [FreeTextEntry1] : Open left inguinal hernia repair [FreeTextEntry2] : 5/15/24 [FreeTextEntry3] : Dr. León [FreeTextEntry4] : Patient is a 67yo male presenting for a preop clearance for open left inguinal hernia repair scheduled on 5/15/24 with Dr. León. PMH includes aortic aneurysm, HTN, HLD. - saw cards Dr. Corrigan for routine visit - had EKG and echo done on 4/23/24 - EKG sinus kierra

## 2024-04-30 NOTE — PLAN
[FreeTextEntry1] : - EKG from 4/23 sinus kierra  - per cards Dr. Corrigan, pt to stay on 81mg Aspirin   As part of the new initiative, "STOP BANG" questionnaire was performed, patient scored positive due to age and history of HTN. Will receive a sleep medicine consultation.   Based on examination, there are no acute contraindications to proceeding with above listed surgery at this time. Patient was advised to avoid all NSAIDs/ vitamins/ supplements for 1 week prior to surgery. He may resume these post-operatively when advised to do so by his surgeon. Reviewed risks of constipation and DVT post operatively and ways to decrease risk for these issues, including limiting  frequency and duration of opioid medication, increasing  fluid and fiber intake, early and frequent mobilization, and if necessary using Miralax and/or Dulcolax (short term).

## 2024-05-01 LAB
ALBUMIN SERPL ELPH-MCNC: 4.6 G/DL
ALP BLD-CCNC: 113 U/L
ALT SERPL-CCNC: 29 U/L
ANION GAP SERPL CALC-SCNC: 12 MMOL/L
APTT BLD: 34.8 SEC
AST SERPL-CCNC: 29 U/L
BASOPHILS # BLD AUTO: 0.02 K/UL
BASOPHILS NFR BLD AUTO: 0.2 %
BILIRUB SERPL-MCNC: 0.7 MG/DL
BUN SERPL-MCNC: 16 MG/DL
CALCIUM SERPL-MCNC: 10 MG/DL
CHLORIDE SERPL-SCNC: 103 MMOL/L
CO2 SERPL-SCNC: 26 MMOL/L
CREAT SERPL-MCNC: 0.97 MG/DL
EGFR: 86 ML/MIN/1.73M2
EOSINOPHIL # BLD AUTO: 0.18 K/UL
EOSINOPHIL NFR BLD AUTO: 2.1 %
GLUCOSE SERPL-MCNC: 94 MG/DL
HCT VFR BLD CALC: 49.6 %
HGB BLD-MCNC: 16.6 G/DL
IMM GRANULOCYTES NFR BLD AUTO: 0.3 %
INR PPP: 0.95 RATIO
LYMPHOCYTES # BLD AUTO: 2 K/UL
LYMPHOCYTES NFR BLD AUTO: 23.3 %
MAN DIFF?: NORMAL
MCHC RBC-ENTMCNC: 30.5 PG
MCHC RBC-ENTMCNC: 33.5 GM/DL
MCV RBC AUTO: 91.2 FL
MONOCYTES # BLD AUTO: 0.91 K/UL
MONOCYTES NFR BLD AUTO: 10.6 %
NEUTROPHILS # BLD AUTO: 5.46 K/UL
NEUTROPHILS NFR BLD AUTO: 63.5 %
PLATELET # BLD AUTO: 312 K/UL
POTASSIUM SERPL-SCNC: 4.5 MMOL/L
PROT SERPL-MCNC: 7.4 G/DL
PT BLD: 10.8 SEC
RBC # BLD: 5.44 M/UL
RBC # FLD: 13.5 %
SODIUM SERPL-SCNC: 140 MMOL/L
WBC # FLD AUTO: 8.6 K/UL

## 2024-05-08 ENCOUNTER — APPOINTMENT (OUTPATIENT)
Dept: SURGERY | Facility: CLINIC | Age: 69
End: 2024-05-08

## 2024-06-06 ENCOUNTER — RX RENEWAL (OUTPATIENT)
Age: 69
End: 2024-06-06

## 2024-06-06 RX ORDER — ROSUVASTATIN CALCIUM 5 MG/1
5 TABLET, FILM COATED ORAL
Qty: 90 | Refills: 0 | Status: ACTIVE | COMMUNITY
Start: 2021-05-24 | End: 1900-01-01

## 2024-06-18 ENCOUNTER — EMERGENCY (EMERGENCY)
Facility: HOSPITAL | Age: 69
LOS: 0 days | Discharge: HOME | End: 2024-06-18
Attending: STUDENT IN AN ORGANIZED HEALTH CARE EDUCATION/TRAINING PROGRAM
Payer: MEDICARE

## 2024-06-18 VITALS
SYSTOLIC BLOOD PRESSURE: 121 MMHG | DIASTOLIC BLOOD PRESSURE: 89 MMHG | OXYGEN SATURATION: 97 % | HEART RATE: 54 BPM | RESPIRATION RATE: 18 BRPM

## 2024-06-18 VITALS — WEIGHT: 182.98 LBS | HEIGHT: 69 IN

## 2024-06-18 DIAGNOSIS — Z88.1 ALLERGY STATUS TO OTHER ANTIBIOTIC AGENTS: ICD-10-CM

## 2024-06-18 DIAGNOSIS — Z90.89 ACQUIRED ABSENCE OF OTHER ORGANS: Chronic | ICD-10-CM

## 2024-06-18 DIAGNOSIS — K21.9 GASTRO-ESOPHAGEAL REFLUX DISEASE WITHOUT ESOPHAGITIS: ICD-10-CM

## 2024-06-18 DIAGNOSIS — Z98.890 OTHER SPECIFIED POSTPROCEDURAL STATES: Chronic | ICD-10-CM

## 2024-06-18 DIAGNOSIS — I71.40 ABDOMINAL AORTIC ANEURYSM, WITHOUT RUPTURE, UNSPECIFIED: ICD-10-CM

## 2024-06-18 DIAGNOSIS — R07.89 OTHER CHEST PAIN: ICD-10-CM

## 2024-06-18 DIAGNOSIS — Z88.8 ALLERGY STATUS TO OTHER DRUGS, MEDICAMENTS AND BIOLOGICAL SUBSTANCES STATUS: ICD-10-CM

## 2024-06-18 DIAGNOSIS — Z90.49 ACQUIRED ABSENCE OF OTHER SPECIFIED PARTS OF DIGESTIVE TRACT: Chronic | ICD-10-CM

## 2024-06-18 DIAGNOSIS — Z82.49 FAMILY HISTORY OF ISCHEMIC HEART DISEASE AND OTHER DISEASES OF THE CIRCULATORY SYSTEM: ICD-10-CM

## 2024-06-18 DIAGNOSIS — I10 ESSENTIAL (PRIMARY) HYPERTENSION: ICD-10-CM

## 2024-06-18 DIAGNOSIS — K66.0 PERITONEAL ADHESIONS (POSTPROCEDURAL) (POSTINFECTION): Chronic | ICD-10-CM

## 2024-06-18 DIAGNOSIS — K35.2 ACUTE APPENDICITIS WITH GENERALIZED PERITONITIS: Chronic | ICD-10-CM

## 2024-06-18 DIAGNOSIS — Z86.73 PERSONAL HISTORY OF TRANSIENT ISCHEMIC ATTACK (TIA), AND CEREBRAL INFARCTION WITHOUT RESIDUAL DEFICITS: ICD-10-CM

## 2024-06-18 DIAGNOSIS — E78.5 HYPERLIPIDEMIA, UNSPECIFIED: ICD-10-CM

## 2024-06-18 DIAGNOSIS — K56.69 OTHER INTESTINAL OBSTRUCTION: Chronic | ICD-10-CM

## 2024-06-18 LAB
ALBUMIN SERPL ELPH-MCNC: 3.7 G/DL — SIGNIFICANT CHANGE UP (ref 3.3–5)
ALP SERPL-CCNC: 102 U/L — SIGNIFICANT CHANGE UP (ref 40–120)
ALT FLD-CCNC: 31 U/L — SIGNIFICANT CHANGE UP (ref 12–78)
ANION GAP SERPL CALC-SCNC: 4 MMOL/L — LOW (ref 5–17)
APTT BLD: 34.2 SEC — SIGNIFICANT CHANGE UP (ref 24.5–35.6)
AST SERPL-CCNC: 23 U/L — SIGNIFICANT CHANGE UP (ref 15–37)
BASOPHILS # BLD AUTO: 0.02 K/UL — SIGNIFICANT CHANGE UP (ref 0–0.2)
BASOPHILS NFR BLD AUTO: 0.3 % — SIGNIFICANT CHANGE UP (ref 0–2)
BILIRUB SERPL-MCNC: 0.5 MG/DL — SIGNIFICANT CHANGE UP (ref 0.2–1.2)
BUN SERPL-MCNC: 18 MG/DL — SIGNIFICANT CHANGE UP (ref 7–23)
CALCIUM SERPL-MCNC: 9.1 MG/DL — SIGNIFICANT CHANGE UP (ref 8.5–10.1)
CHLORIDE SERPL-SCNC: 112 MMOL/L — HIGH (ref 96–108)
CO2 SERPL-SCNC: 25 MMOL/L — SIGNIFICANT CHANGE UP (ref 22–31)
CREAT SERPL-MCNC: 0.9 MG/DL — SIGNIFICANT CHANGE UP (ref 0.5–1.3)
EGFR: 94 ML/MIN/1.73M2 — SIGNIFICANT CHANGE UP
EOSINOPHIL # BLD AUTO: 0.13 K/UL — SIGNIFICANT CHANGE UP (ref 0–0.5)
EOSINOPHIL NFR BLD AUTO: 1.9 % — SIGNIFICANT CHANGE UP (ref 0–6)
GLUCOSE SERPL-MCNC: 88 MG/DL — SIGNIFICANT CHANGE UP (ref 70–99)
HCT VFR BLD CALC: 44.3 % — SIGNIFICANT CHANGE UP (ref 39–50)
HGB BLD-MCNC: 15.3 G/DL — SIGNIFICANT CHANGE UP (ref 13–17)
IMM GRANULOCYTES NFR BLD AUTO: 0.1 % — SIGNIFICANT CHANGE UP (ref 0–0.9)
INR BLD: 1.01 RATIO — SIGNIFICANT CHANGE UP (ref 0.85–1.18)
LYMPHOCYTES # BLD AUTO: 2.08 K/UL — SIGNIFICANT CHANGE UP (ref 1–3.3)
LYMPHOCYTES # BLD AUTO: 30.9 % — SIGNIFICANT CHANGE UP (ref 13–44)
MAGNESIUM SERPL-MCNC: 2 MG/DL — SIGNIFICANT CHANGE UP (ref 1.6–2.6)
MCHC RBC-ENTMCNC: 31.2 PG — SIGNIFICANT CHANGE UP (ref 27–34)
MCHC RBC-ENTMCNC: 34.5 GM/DL — SIGNIFICANT CHANGE UP (ref 32–36)
MCV RBC AUTO: 90.2 FL — SIGNIFICANT CHANGE UP (ref 80–100)
MONOCYTES # BLD AUTO: 0.93 K/UL — HIGH (ref 0–0.9)
MONOCYTES NFR BLD AUTO: 13.8 % — SIGNIFICANT CHANGE UP (ref 2–14)
NEUTROPHILS # BLD AUTO: 3.57 K/UL — SIGNIFICANT CHANGE UP (ref 1.8–7.4)
NEUTROPHILS NFR BLD AUTO: 53 % — SIGNIFICANT CHANGE UP (ref 43–77)
PLATELET # BLD AUTO: 233 K/UL — SIGNIFICANT CHANGE UP (ref 150–400)
POTASSIUM SERPL-MCNC: 4 MMOL/L — SIGNIFICANT CHANGE UP (ref 3.5–5.3)
POTASSIUM SERPL-SCNC: 4 MMOL/L — SIGNIFICANT CHANGE UP (ref 3.5–5.3)
PROT SERPL-MCNC: 7.1 GM/DL — SIGNIFICANT CHANGE UP (ref 6–8.3)
PROTHROM AB SERPL-ACNC: 11.4 SEC — SIGNIFICANT CHANGE UP (ref 9.5–13)
RBC # BLD: 4.91 M/UL — SIGNIFICANT CHANGE UP (ref 4.2–5.8)
RBC # FLD: 13.4 % — SIGNIFICANT CHANGE UP (ref 10.3–14.5)
SODIUM SERPL-SCNC: 141 MMOL/L — SIGNIFICANT CHANGE UP (ref 135–145)
TROPONIN I, HIGH SENSITIVITY RESULT: 5.66 NG/L — SIGNIFICANT CHANGE UP
TROPONIN I, HIGH SENSITIVITY RESULT: 6.74 NG/L — SIGNIFICANT CHANGE UP
WBC # BLD: 6.74 K/UL — SIGNIFICANT CHANGE UP (ref 3.8–10.5)
WBC # FLD AUTO: 6.74 K/UL — SIGNIFICANT CHANGE UP (ref 3.8–10.5)

## 2024-06-18 PROCEDURE — 93010 ELECTROCARDIOGRAM REPORT: CPT

## 2024-06-18 PROCEDURE — 83735 ASSAY OF MAGNESIUM: CPT

## 2024-06-18 PROCEDURE — 36415 COLL VENOUS BLD VENIPUNCTURE: CPT

## 2024-06-18 PROCEDURE — 93005 ELECTROCARDIOGRAM TRACING: CPT

## 2024-06-18 PROCEDURE — 99285 EMERGENCY DEPT VISIT HI MDM: CPT | Mod: 25

## 2024-06-18 PROCEDURE — 80053 COMPREHEN METABOLIC PANEL: CPT

## 2024-06-18 PROCEDURE — 85025 COMPLETE CBC W/AUTO DIFF WBC: CPT

## 2024-06-18 PROCEDURE — 71045 X-RAY EXAM CHEST 1 VIEW: CPT

## 2024-06-18 PROCEDURE — 85610 PROTHROMBIN TIME: CPT

## 2024-06-18 PROCEDURE — 71045 X-RAY EXAM CHEST 1 VIEW: CPT | Mod: 26

## 2024-06-18 PROCEDURE — 84484 ASSAY OF TROPONIN QUANT: CPT

## 2024-06-18 PROCEDURE — 85730 THROMBOPLASTIN TIME PARTIAL: CPT

## 2024-06-18 PROCEDURE — 99285 EMERGENCY DEPT VISIT HI MDM: CPT

## 2024-06-18 NOTE — ED ADULT NURSE NOTE - OBJECTIVE STATEMENT
pt presents to ER for chest pressure starting earlier today. pt reports he was working outside all day, went for a nap and woke up feeling like his heart was racing. pt reports wearing a heart monitor which showed PVC, tachycardia and afib today. states "Inna had PVCs in the past, and inna had episode of afib. I really came in because of this discomfort im having and my L arm feels like it has a cramp". hx of HTN. denies any other medical complaints.

## 2024-06-18 NOTE — ED STATDOCS - PROGRESS NOTE DETAILS
Daquan Ding for Dr. Franklin  67 y/o M with PMHx of ascending aortic aneurysm, HLD, HTN, TIA, GERD, SBO, depression, suicidal intent, appendectomy, prostate CA s/p radical prostatectomy last year, hernia surgery on 5/15/24 presents to the ED c/o chest tightness. Reports he was doing yard work and felt overheated around 1pm and then went home and had something to drink. Has cardiomobile and reports his phone notified him he was in and out of afib & NSR. States he gets PVCs intermittently. Cardiologist Shekhar. Will send to main.

## 2024-06-18 NOTE — ED PROVIDER NOTE - PATIENT PORTAL LINK FT
You can access the FollowMyHealth Patient Portal offered by [facility] by registering at the following website: [email or web address]. By joining [facility]’s FollowMyHealth portal, you will also be able to view your health information using other applications (apps) compatible with our system.

## 2024-06-18 NOTE — ED PROVIDER NOTE - OBJECTIVE STATEMENT
67 y/o male with PMHx of AAA (followed by Dr Arnett), GERD, TIA, HLD, HTN, SBO x3, PVCs, hernia surgery on 5/15/24 ; presents to ED c/o chest pain with tightness and pressure after doing yard work at approximately 16:00-17:00. Patient states he has a cardiac monitor on his phone that showed reading of A-fib. Denies abdominal pain, back pain, lower extremity edema +ASA 81 mg    Cardiologist Dr. Gaye Corrigan 69 y/o male with PMHx of AAA (followed by Dr Arnett), GERD, TIA, HLD, HTN, SBO x3, PVCs, hernia surgery on 5/15/24 ; presents to ED c/o chest pain with tightness and pressure after doing yard work at approximately 16:00-17:00, patient states now intermittent and sometimes radiates down LUE. Patient states he has a cardiac monitor on his phone that showed reading of A-fib. Denies abdominal pain, back pain, lower extremity edema +ASA 81 mg    Cardiologist Dr. Gaye Corrigan

## 2024-06-18 NOTE — ED PROVIDER NOTE - NSFOLLOWUPINSTRUCTIONS_ED_ALL_ED_FT
** You have chest pain.     ** Follow up with your cardiologist in the next 72 hours.     ** Go to the nearest Emergency Department if you experience any new or concerning symptoms, such as:   - persistent or a different type of chest pain  - difficulty breathing  - passing out  - unable to eat or drink  - unable to move or feel part of your body  - fever, chills

## 2024-06-18 NOTE — ED ADULT TRIAGE NOTE - PATIENT'S PREFERRED PRONOUN
Contacted patient regarding BMP ordered by Dr. Dawkins to be done 8/23/17 and has not yet been done. Patient states she forgot and will go this week. States she needs to set up transportation and will go for labs on 8/30/17.  
Him/He

## 2024-06-18 NOTE — ED ADULT TRIAGE NOTE - CHIEF COMPLAINT QUOTE
ambulatory into ED with c/o chest tightness. reports he was doing yard work and felt overheated around 1 PM and then went home and had something to drink. has cardiomobile and reports his phone notified him he was in and out of a fib & NSR. patient denies palpitations and says he feels like he has to cough. pt to receive EKG.

## 2024-06-18 NOTE — ED PROVIDER NOTE - CLINICAL SUMMARY MEDICAL DECISION MAKING FREE TEXT BOX
Patient with chest pain. Well appearing, vitally stable. EKG normal sinus. Labs, cxr ordered and wnl.

## 2024-06-18 NOTE — ED PROVIDER NOTE - PROGRESS NOTE DETAILS
Clary Greene MD, Attending   Pt received at signout pending repeat troponin for ro ACS. EKG normal.   Delta trop negative. Pt well appearing, agreeable to oupt follow up with on cardiologist.

## 2024-06-18 NOTE — ED ADULT NURSE NOTE - NSFALLUNIVINTERV_ED_ALL_ED
Bed/Stretcher in lowest position, wheels locked, appropriate side rails in place/Call bell, personal items and telephone in reach/Instruct patient to call for assistance before getting out of bed/chair/stretcher/Non-slip footwear applied when patient is off stretcher/[place] to call system/Physically safe environment - no spills, clutter or unnecessary equipment/Purposeful proactive rounding/Room/bathroom lighting operational, light cord in reach

## 2024-07-12 ENCOUNTER — APPOINTMENT (OUTPATIENT)
Dept: FAMILY MEDICINE | Facility: CLINIC | Age: 69
End: 2024-07-12
Payer: MEDICARE

## 2024-07-12 VITALS
TEMPERATURE: 97.8 F | BODY MASS INDEX: 27.42 KG/M2 | WEIGHT: 183 LBS | HEART RATE: 59 BPM | SYSTOLIC BLOOD PRESSURE: 118 MMHG | DIASTOLIC BLOOD PRESSURE: 62 MMHG | HEIGHT: 68.5 IN | OXYGEN SATURATION: 95 %

## 2024-07-12 DIAGNOSIS — I10 ESSENTIAL (PRIMARY) HYPERTENSION: ICD-10-CM

## 2024-07-12 DIAGNOSIS — I47.10 SUPRAVENTRICULAR TACHYCARDIA, UNSPECIFIED: ICD-10-CM

## 2024-07-12 DIAGNOSIS — K56.609 UNSPECIFIED INTESTINAL OBSTRUCTION, UNSPECIFIED AS TO PARTIAL VERSUS COMPLETE OBSTRUCTION: ICD-10-CM

## 2024-07-12 DIAGNOSIS — D23.4 OTHER BENIGN NEOPLASM OF SKIN OF SCALP AND NECK: ICD-10-CM

## 2024-07-12 DIAGNOSIS — R11.0 NAUSEA: ICD-10-CM

## 2024-07-12 PROCEDURE — 99214 OFFICE O/P EST MOD 30 MIN: CPT

## 2024-07-12 RX ORDER — SULFAMETHOXAZOLE AND TRIMETHOPRIM 800; 160 MG/1; MG/1
800-160 TABLET ORAL TWICE DAILY
Qty: 20 | Refills: 0 | Status: ACTIVE | COMMUNITY
Start: 2024-07-12 | End: 1900-01-01

## 2024-07-12 RX ORDER — AMLODIPINE BESYLATE 5 MG/1
5 TABLET ORAL
Qty: 90 | Refills: 3 | Status: ACTIVE | COMMUNITY

## 2024-07-31 NOTE — DATA REVIEWED
INTERVAL HISTORY (In Person): Ms. Hall is an 88-year-old female wih is following up with me 2.5 months since her last appointment re: a history of Major Depression. She has been hospitalized 4 times since 2018, the last time in 8/2023 for 2 weeks, and has also been in the SOP program (most recently, 11/2022--12/2023). She's responded well to the use of Effexor XR and particularly to the support and structure of the IOP program. I most recently increased the Effexor XR to 225 mg and later added Abilify, and she was stable and euthymic when she graduated from the [facility] as well as at the last 2 appts.     She comes in today and states that she's still been feeling \"fair\", and that overall her mood has still been relatively good. She has an Aide that comes 5 days a week, for 4 hours each day, and that has been very helpful for her (mostly socially). She states she hasn't been having as many of the down days, and that overall her mood has been good. She's able to stay connected to friends and family by phone, and spends a lot of time talking with them. She has been having more arthritic pains and she's moving a little slower, but overall she's doing well now. She apparently was taken off the Abilify last month and she's tolerating the Effexor XR well. Her N/V functioning has been relatively good, and she denies having any SE's with the meds. Daughter still sets up her weekly meds, and she's very compliant with taking them. She doesn't feel that she needs to come back to the SOP program right now, which is also an indicator that she's feeling and functioning adequately.         CURRENT MEDICATION:  1. Effexor  mg daily (150 and 75)  2.  2 mg daily--stopped last month     MENTAL STATUS EXAM: Dannielle was noted to be a casually dressed, adequately groomed 88-year-old who appeared her stated age. She was very pleasant and cooperative, and exhibited a full affective range this time. Her mood was euthymic and she  [de-identified] : 4/19/21: MRI head: No acute infarct, hemorrhage or mass effect [de-identified] : 4/18/21: CT engine ahead and neck: no LVO, severe stenosis or vascular malformation\par CT perfusion: No ischemic penumbra

## 2024-09-03 ENCOUNTER — RX RENEWAL (OUTPATIENT)
Age: 69
End: 2024-09-03

## 2024-10-14 ENCOUNTER — TRANSCRIPTION ENCOUNTER (OUTPATIENT)
Age: 69
End: 2024-10-14

## 2024-10-15 PROBLEM — C61 PROSTATE CANCER: Status: ACTIVE | Noted: 2024-10-15

## 2024-10-16 ENCOUNTER — APPOINTMENT (OUTPATIENT)
Dept: FAMILY MEDICINE | Facility: CLINIC | Age: 69
End: 2024-10-16
Payer: MEDICARE

## 2024-10-16 VITALS
HEIGHT: 68.5 IN | TEMPERATURE: 97.1 F | HEART RATE: 52 BPM | SYSTOLIC BLOOD PRESSURE: 126 MMHG | BODY MASS INDEX: 27.12 KG/M2 | OXYGEN SATURATION: 95 % | DIASTOLIC BLOOD PRESSURE: 82 MMHG | WEIGHT: 181 LBS

## 2024-10-16 DIAGNOSIS — Z00.00 ENCOUNTER FOR GENERAL ADULT MEDICAL EXAMINATION W/OUT ABNORMAL FINDINGS: ICD-10-CM

## 2024-10-16 DIAGNOSIS — I10 ESSENTIAL (PRIMARY) HYPERTENSION: ICD-10-CM

## 2024-10-16 DIAGNOSIS — E78.00 PURE HYPERCHOLESTEROLEMIA, UNSPECIFIED: ICD-10-CM

## 2024-10-16 DIAGNOSIS — Z23 ENCOUNTER FOR IMMUNIZATION: ICD-10-CM

## 2024-10-16 DIAGNOSIS — F32.A DEPRESSION, UNSPECIFIED: ICD-10-CM

## 2024-10-16 DIAGNOSIS — C61 MALIGNANT NEOPLASM OF PROSTATE: ICD-10-CM

## 2024-10-16 PROCEDURE — G0008: CPT

## 2024-10-16 PROCEDURE — 90656 IIV3 VACC NO PRSV 0.5 ML IM: CPT

## 2024-10-16 PROCEDURE — 36415 COLL VENOUS BLD VENIPUNCTURE: CPT

## 2024-10-16 PROCEDURE — G0439: CPT

## 2024-10-16 RX ORDER — VALSARTAN 40 MG/1
40 TABLET, COATED ORAL
Refills: 0 | Status: ACTIVE | COMMUNITY

## 2024-10-16 RX ORDER — AMLODIPINE BESYLATE 2.5 MG/1
2.5 TABLET ORAL
Refills: 0 | Status: ACTIVE | COMMUNITY

## 2024-10-16 RX ORDER — MULTIVITAMIN
TABLET ORAL
Refills: 0 | Status: ACTIVE | COMMUNITY

## 2024-10-17 ENCOUNTER — TRANSCRIPTION ENCOUNTER (OUTPATIENT)
Age: 69
End: 2024-10-17

## 2024-10-17 LAB
ALBUMIN SERPL ELPH-MCNC: 4.7 G/DL
ALP BLD-CCNC: 86 U/L
ALT SERPL-CCNC: 20 U/L
ANION GAP SERPL CALC-SCNC: 13 MMOL/L
AST SERPL-CCNC: 26 U/L
BASOPHILS # BLD AUTO: 0.03 K/UL
BASOPHILS NFR BLD AUTO: 0.4 %
BILIRUB SERPL-MCNC: 0.7 MG/DL
BUN SERPL-MCNC: 14 MG/DL
CALCIUM SERPL-MCNC: 9.7 MG/DL
CHLORIDE SERPL-SCNC: 102 MMOL/L
CHOLEST SERPL-MCNC: 151 MG/DL
CO2 SERPL-SCNC: 27 MMOL/L
CREAT SERPL-MCNC: 0.96 MG/DL
EGFR: 87 ML/MIN/1.73M2
EOSINOPHIL # BLD AUTO: 0.29 K/UL
EOSINOPHIL NFR BLD AUTO: 3.4 %
GLUCOSE SERPL-MCNC: 84 MG/DL
HCT VFR BLD CALC: 49.4 %
HDLC SERPL-MCNC: 62 MG/DL
HGB BLD-MCNC: 16.5 G/DL
IMM GRANULOCYTES NFR BLD AUTO: 0.1 %
LDLC SERPL CALC-MCNC: 67 MG/DL
LYMPHOCYTES # BLD AUTO: 2.16 K/UL
LYMPHOCYTES NFR BLD AUTO: 25.6 %
MAN DIFF?: NORMAL
MCHC RBC-ENTMCNC: 30.9 PG
MCHC RBC-ENTMCNC: 33.4 GM/DL
MCV RBC AUTO: 92.5 FL
MONOCYTES # BLD AUTO: 0.86 K/UL
MONOCYTES NFR BLD AUTO: 10.2 %
NEUTROPHILS # BLD AUTO: 5.09 K/UL
NEUTROPHILS NFR BLD AUTO: 60.3 %
NONHDLC SERPL-MCNC: 89 MG/DL
PLATELET # BLD AUTO: 265 K/UL
POTASSIUM SERPL-SCNC: 4.7 MMOL/L
PROT SERPL-MCNC: 7.5 G/DL
RBC # BLD: 5.34 M/UL
RBC # FLD: 13.7 %
SODIUM SERPL-SCNC: 141 MMOL/L
TRIGL SERPL-MCNC: 122 MG/DL
WBC # FLD AUTO: 8.44 K/UL

## 2024-12-09 ENCOUNTER — RX RENEWAL (OUTPATIENT)
Age: 69
End: 2024-12-09

## 2025-02-04 ENCOUNTER — NON-APPOINTMENT (OUTPATIENT)
Age: 70
End: 2025-02-04

## 2025-02-05 ENCOUNTER — APPOINTMENT (OUTPATIENT)
Dept: UROLOGY | Facility: CLINIC | Age: 70
End: 2025-02-05
Payer: MEDICARE

## 2025-02-05 VITALS
DIASTOLIC BLOOD PRESSURE: 92 MMHG | SYSTOLIC BLOOD PRESSURE: 135 MMHG | HEIGHT: 68.5 IN | BODY MASS INDEX: 27.42 KG/M2 | WEIGHT: 183 LBS

## 2025-02-05 DIAGNOSIS — R39.15 URGENCY OF URINATION: ICD-10-CM

## 2025-02-05 DIAGNOSIS — N52.9 MALE ERECTILE DYSFUNCTION, UNSPECIFIED: ICD-10-CM

## 2025-02-05 DIAGNOSIS — Z85.46 PERSONAL HISTORY OF MALIGNANT NEOPLASM OF PROSTATE: ICD-10-CM

## 2025-02-05 PROCEDURE — 99214 OFFICE O/P EST MOD 30 MIN: CPT

## 2025-02-06 LAB
APPEARANCE: CLEAR
BACTERIA: NEGATIVE /HPF
BILIRUBIN URINE: NEGATIVE
BLOOD URINE: NEGATIVE
CAST: 0 /LPF
COLOR: YELLOW
EPITHELIAL CELLS: 0 /HPF
GLUCOSE QUALITATIVE U: NEGATIVE MG/DL
KETONES URINE: NEGATIVE MG/DL
LEUKOCYTE ESTERASE URINE: NEGATIVE
MICROSCOPIC-UA: NORMAL
NITRITE URINE: NEGATIVE
PH URINE: 7
PROTEIN URINE: NORMAL MG/DL
RED BLOOD CELLS URINE: 1 /HPF
SPECIFIC GRAVITY URINE: 1.02
UROBILINOGEN URINE: 1 MG/DL
WHITE BLOOD CELLS URINE: 0 /HPF

## 2025-02-12 ENCOUNTER — TRANSCRIPTION ENCOUNTER (OUTPATIENT)
Age: 70
End: 2025-02-12

## 2025-02-12 ENCOUNTER — APPOINTMENT (OUTPATIENT)
Dept: FAMILY MEDICINE | Facility: CLINIC | Age: 70
End: 2025-02-12
Payer: MEDICARE

## 2025-02-12 VITALS
HEART RATE: 75 BPM | WEIGHT: 183 LBS | DIASTOLIC BLOOD PRESSURE: 72 MMHG | HEIGHT: 68.5 IN | BODY MASS INDEX: 27.42 KG/M2 | TEMPERATURE: 97.3 F | SYSTOLIC BLOOD PRESSURE: 118 MMHG | OXYGEN SATURATION: 96 %

## 2025-02-12 DIAGNOSIS — R13.10 DYSPHAGIA, UNSPECIFIED: ICD-10-CM

## 2025-02-12 DIAGNOSIS — R05.9 COUGH, UNSPECIFIED: ICD-10-CM

## 2025-02-12 DIAGNOSIS — I10 ESSENTIAL (PRIMARY) HYPERTENSION: ICD-10-CM

## 2025-02-12 LAB
BACTERIA UR CULT: NORMAL
PSA SERPL-MCNC: <0.01 NG/ML
TESTOST FREE SERPL-MCNC: 6.5 PG/ML
TESTOST SERPL-MCNC: 561 NG/DL
URINE CYTOLOGY: NORMAL

## 2025-02-12 PROCEDURE — 99213 OFFICE O/P EST LOW 20 MIN: CPT

## 2025-03-10 ENCOUNTER — RX RENEWAL (OUTPATIENT)
Age: 70
End: 2025-03-10

## 2025-03-13 ENCOUNTER — RX RENEWAL (OUTPATIENT)
Age: 70
End: 2025-03-13

## 2025-06-09 ENCOUNTER — TRANSCRIPTION ENCOUNTER (OUTPATIENT)
Age: 70
End: 2025-06-09

## 2025-06-11 ENCOUNTER — APPOINTMENT (OUTPATIENT)
Dept: FAMILY MEDICINE | Facility: CLINIC | Age: 70
End: 2025-06-11
Payer: MEDICARE

## 2025-06-11 VITALS
DIASTOLIC BLOOD PRESSURE: 88 MMHG | HEART RATE: 56 BPM | TEMPERATURE: 97.5 F | BODY MASS INDEX: 27.42 KG/M2 | OXYGEN SATURATION: 96 % | SYSTOLIC BLOOD PRESSURE: 130 MMHG | HEIGHT: 68.5 IN | WEIGHT: 183 LBS

## 2025-06-11 PROBLEM — M25.511 BILATERAL SHOULDER PAIN: Status: ACTIVE | Noted: 2025-06-11

## 2025-06-11 PROCEDURE — 99213 OFFICE O/P EST LOW 20 MIN: CPT

## 2025-06-14 ENCOUNTER — EMERGENCY (EMERGENCY)
Facility: HOSPITAL | Age: 70
LOS: 1 days | Discharge: HOME | End: 2025-06-16
Attending: EMERGENCY MEDICINE
Payer: COMMERCIAL

## 2025-06-14 VITALS
RESPIRATION RATE: 18 BRPM | TEMPERATURE: 98 F | OXYGEN SATURATION: 97 % | DIASTOLIC BLOOD PRESSURE: 88 MMHG | SYSTOLIC BLOOD PRESSURE: 145 MMHG | HEART RATE: 63 BPM

## 2025-06-14 DIAGNOSIS — Z90.89 ACQUIRED ABSENCE OF OTHER ORGANS: Chronic | ICD-10-CM

## 2025-06-14 DIAGNOSIS — Z90.49 ACQUIRED ABSENCE OF OTHER SPECIFIED PARTS OF DIGESTIVE TRACT: Chronic | ICD-10-CM

## 2025-06-14 DIAGNOSIS — K56.69 OTHER INTESTINAL OBSTRUCTION: Chronic | ICD-10-CM

## 2025-06-14 DIAGNOSIS — K66.0 PERITONEAL ADHESIONS (POSTPROCEDURAL) (POSTINFECTION): Chronic | ICD-10-CM

## 2025-06-14 DIAGNOSIS — Z98.890 OTHER SPECIFIED POSTPROCEDURAL STATES: Chronic | ICD-10-CM

## 2025-06-14 DIAGNOSIS — K35.2 ACUTE APPENDICITIS WITH GENERALIZED PERITONITIS: Chronic | ICD-10-CM

## 2025-06-14 PROCEDURE — 82803 BLOOD GASES ANY COMBINATION: CPT

## 2025-06-14 PROCEDURE — 80048 BASIC METABOLIC PNL TOTAL CA: CPT

## 2025-06-14 PROCEDURE — 99053 MED SERV 10PM-8AM 24 HR FAC: CPT

## 2025-06-14 PROCEDURE — 93306 TTE W/DOPPLER COMPLETE: CPT

## 2025-06-14 PROCEDURE — 86850 RBC ANTIBODY SCREEN: CPT

## 2025-06-14 PROCEDURE — 85730 THROMBOPLASTIN TIME PARTIAL: CPT

## 2025-06-14 PROCEDURE — 71275 CT ANGIOGRAPHY CHEST: CPT

## 2025-06-14 PROCEDURE — 84443 ASSAY THYROID STIM HORMONE: CPT

## 2025-06-14 PROCEDURE — 74174 CTA ABD&PLVS W/CONTRAST: CPT

## 2025-06-14 PROCEDURE — 86900 BLOOD TYPING SEROLOGIC ABO: CPT

## 2025-06-14 PROCEDURE — 93010 ELECTROCARDIOGRAM REPORT: CPT

## 2025-06-14 PROCEDURE — 78452 HT MUSCLE IMAGE SPECT MULT: CPT

## 2025-06-14 PROCEDURE — 70486 CT MAXILLOFACIAL W/O DYE: CPT

## 2025-06-14 PROCEDURE — 86901 BLOOD TYPING SEROLOGIC RH(D): CPT

## 2025-06-14 PROCEDURE — 84484 ASSAY OF TROPONIN QUANT: CPT

## 2025-06-14 PROCEDURE — 36415 COLL VENOUS BLD VENIPUNCTURE: CPT

## 2025-06-14 PROCEDURE — 70450 CT HEAD/BRAIN W/O DYE: CPT

## 2025-06-14 PROCEDURE — 80053 COMPREHEN METABOLIC PANEL: CPT

## 2025-06-14 PROCEDURE — A9500: CPT

## 2025-06-14 PROCEDURE — 85610 PROTHROMBIN TIME: CPT

## 2025-06-14 PROCEDURE — 72125 CT NECK SPINE W/O DYE: CPT

## 2025-06-14 PROCEDURE — 85025 COMPLETE CBC W/AUTO DIFF WBC: CPT

## 2025-06-14 PROCEDURE — 99285 EMERGENCY DEPT VISIT HI MDM: CPT

## 2025-06-14 PROCEDURE — 93005 ELECTROCARDIOGRAM TRACING: CPT

## 2025-06-14 PROCEDURE — 93017 CV STRESS TEST TRACING ONLY: CPT

## 2025-06-14 PROCEDURE — 99285 EMERGENCY DEPT VISIT HI MDM: CPT | Mod: 25

## 2025-06-14 PROCEDURE — G0378: CPT

## 2025-06-14 NOTE — ED ADULT TRIAGE NOTE - PATIENT'S PREFERRED PRONOUN
[facility] sent Rx request for the following:      Requested Prescriptions   Pending Prescriptions Disp Refills    ursodiol (ACTIGALL) 300 MG capsule [Pharmacy Med Name: ursodiol 300 mg capsule] 180 capsule 0     Sig: Take 1 capsule (300 mg) by mouth 2 times daily.       There is no refill protocol information for this order          Last Prescription Date:   9/12/24  Last Fill Qty/Refills:         180, R-0    Last Office Visit:              11/11/24   Future Office visit:            3/11/25    Routing refill request to provider for review/approval because:  Drug not on the [facility] refill protocol     Michelle Rose RN on 12/2/2024 at 2:25 PM          
Him/He
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
## 2025-06-15 DIAGNOSIS — R00.1 BRADYCARDIA, UNSPECIFIED: ICD-10-CM
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
## 2025-06-15 LAB
ALBUMIN SERPL ELPH-MCNC: 3.9 G/DL — SIGNIFICANT CHANGE UP (ref 3.3–5)
ALP SERPL-CCNC: 109 U/L — SIGNIFICANT CHANGE UP (ref 40–120)
ALT FLD-CCNC: 33 U/L — SIGNIFICANT CHANGE UP (ref 12–78)
ANION GAP SERPL CALC-SCNC: 1 MMOL/L — LOW (ref 5–17)
APTT BLD: 33.8 SEC — SIGNIFICANT CHANGE UP (ref 26.1–36.8)
AST SERPL-CCNC: 27 U/L — SIGNIFICANT CHANGE UP (ref 15–37)
BASE EXCESS BLDV CALC-SCNC: 5.1 MMOL/L — HIGH (ref -2–3)
BASOPHILS # BLD AUTO: 0.03 K/UL — SIGNIFICANT CHANGE UP (ref 0–0.2)
BASOPHILS NFR BLD AUTO: 0.4 % — SIGNIFICANT CHANGE UP (ref 0–2)
BILIRUB SERPL-MCNC: 0.3 MG/DL — SIGNIFICANT CHANGE UP (ref 0.2–1.2)
BLD GP AB SCN SERPL QL: SIGNIFICANT CHANGE UP
BUN SERPL-MCNC: 14 MG/DL — SIGNIFICANT CHANGE UP (ref 7–23)
BUN SERPL-MCNC: 19 MG/DL — SIGNIFICANT CHANGE UP (ref 7–23)
CALCIUM SERPL-MCNC: 9.4 MG/DL — SIGNIFICANT CHANGE UP (ref 8.5–10.1)
CALCIUM SERPL-MCNC: 9.5 MG/DL — SIGNIFICANT CHANGE UP (ref 8.5–10.1)
CHLORIDE SERPL-SCNC: 109 MMOL/L — HIGH (ref 96–108)
CHLORIDE SERPL-SCNC: 109 MMOL/L — HIGH (ref 96–108)
CO2 SERPL-SCNC: 31 MMOL/L — SIGNIFICANT CHANGE UP (ref 22–31)
CO2 SERPL-SCNC: 31 MMOL/L — SIGNIFICANT CHANGE UP (ref 22–31)
CREAT SERPL-MCNC: 0.99 MG/DL — SIGNIFICANT CHANGE UP (ref 0.5–1.3)
CREAT SERPL-MCNC: 1.01 MG/DL — SIGNIFICANT CHANGE UP (ref 0.5–1.3)
EGFR: 81 ML/MIN/1.73M2 — SIGNIFICANT CHANGE UP
EGFR: 81 ML/MIN/1.73M2 — SIGNIFICANT CHANGE UP
EGFR: 83 ML/MIN/1.73M2 — SIGNIFICANT CHANGE UP
EGFR: 83 ML/MIN/1.73M2 — SIGNIFICANT CHANGE UP
EOSINOPHIL # BLD AUTO: 0.25 K/UL — SIGNIFICANT CHANGE UP (ref 0–0.5)
EOSINOPHIL NFR BLD AUTO: 3.4 % — SIGNIFICANT CHANGE UP (ref 0–6)
GAS PNL BLDV: SIGNIFICANT CHANGE UP
GLUCOSE SERPL-MCNC: 106 MG/DL — HIGH (ref 70–99)
GLUCOSE SERPL-MCNC: 90 MG/DL — SIGNIFICANT CHANGE UP (ref 70–99)
HCO3 BLDV-SCNC: 32 MMOL/L — HIGH (ref 22–29)
HCT VFR BLD CALC: 47.1 % — SIGNIFICANT CHANGE UP (ref 39–50)
HGB BLD-MCNC: 15.4 G/DL — SIGNIFICANT CHANGE UP (ref 13–17)
IMM GRANULOCYTES # BLD AUTO: 0.01 K/UL — SIGNIFICANT CHANGE UP (ref 0–0.07)
IMM GRANULOCYTES NFR BLD AUTO: 0.1 % — SIGNIFICANT CHANGE UP (ref 0–0.9)
INR BLD: 0.93 RATIO — SIGNIFICANT CHANGE UP (ref 0.85–1.16)
LYMPHOCYTES # BLD AUTO: 2.11 K/UL — SIGNIFICANT CHANGE UP (ref 1–3.3)
LYMPHOCYTES NFR BLD AUTO: 28.4 % — SIGNIFICANT CHANGE UP (ref 13–44)
MCHC RBC-ENTMCNC: 30.4 PG — SIGNIFICANT CHANGE UP (ref 27–34)
MCHC RBC-ENTMCNC: 32.7 G/DL — SIGNIFICANT CHANGE UP (ref 32–36)
MCV RBC AUTO: 92.9 FL — SIGNIFICANT CHANGE UP (ref 80–100)
MONOCYTES # BLD AUTO: 1.04 K/UL — HIGH (ref 0–0.9)
MONOCYTES NFR BLD AUTO: 14 % — SIGNIFICANT CHANGE UP (ref 2–14)
NEUTROPHILS # BLD AUTO: 3.98 K/UL — SIGNIFICANT CHANGE UP (ref 1.8–7.4)
NEUTROPHILS NFR BLD AUTO: 53.7 % — SIGNIFICANT CHANGE UP (ref 43–77)
NRBC # BLD AUTO: 0 K/UL — SIGNIFICANT CHANGE UP (ref 0–0)
NRBC # FLD: 0 K/UL — SIGNIFICANT CHANGE UP (ref 0–0)
NRBC BLD AUTO-RTO: 0 /100 WBCS — SIGNIFICANT CHANGE UP (ref 0–0)
PCO2 BLDV: 57 MMHG — HIGH (ref 42–55)
PH BLDV: 7.36 — SIGNIFICANT CHANGE UP (ref 7.32–7.43)
PLATELET # BLD AUTO: 288 K/UL — SIGNIFICANT CHANGE UP (ref 150–400)
PMV BLD: 10.2 FL — SIGNIFICANT CHANGE UP (ref 7–13)
PO2 BLDV: 36 MMHG — SIGNIFICANT CHANGE UP (ref 25–45)
POTASSIUM SERPL-MCNC: 4 MMOL/L — SIGNIFICANT CHANGE UP (ref 3.5–5.3)
POTASSIUM SERPL-MCNC: 4.3 MMOL/L — SIGNIFICANT CHANGE UP (ref 3.5–5.3)
POTASSIUM SERPL-SCNC: 4 MMOL/L — SIGNIFICANT CHANGE UP (ref 3.5–5.3)
POTASSIUM SERPL-SCNC: 4.3 MMOL/L — SIGNIFICANT CHANGE UP (ref 3.5–5.3)
PROT SERPL-MCNC: 7.4 GM/DL — SIGNIFICANT CHANGE UP (ref 6–8.3)
PROTHROM AB SERPL-ACNC: 11 SEC — SIGNIFICANT CHANGE UP (ref 9.9–13.4)
RBC # BLD: 5.07 M/UL — SIGNIFICANT CHANGE UP (ref 4.2–5.8)
RBC # FLD: 13 % — SIGNIFICANT CHANGE UP (ref 10.3–14.5)
SAO2 % BLDV: 59 % — LOW (ref 67–88)
SODIUM SERPL-SCNC: 141 MMOL/L — SIGNIFICANT CHANGE UP (ref 135–145)
SODIUM SERPL-SCNC: 141 MMOL/L — SIGNIFICANT CHANGE UP (ref 135–145)
TSH SERPL-MCNC: 1.33 UU/ML — SIGNIFICANT CHANGE UP (ref 0.34–4.82)
WBC # BLD: 7.42 K/UL — SIGNIFICANT CHANGE UP (ref 3.8–10.5)
WBC # FLD AUTO: 7.42 K/UL — SIGNIFICANT CHANGE UP (ref 3.8–10.5)

## 2025-06-15 PROCEDURE — 99222 1ST HOSP IP/OBS MODERATE 55: CPT

## 2025-06-15 PROCEDURE — 93306 TTE W/DOPPLER COMPLETE: CPT | Mod: 26

## 2025-06-15 PROCEDURE — 72125 CT NECK SPINE W/O DYE: CPT | Mod: 26

## 2025-06-15 PROCEDURE — 71275 CT ANGIOGRAPHY CHEST: CPT | Mod: 26

## 2025-06-15 PROCEDURE — 70450 CT HEAD/BRAIN W/O DYE: CPT | Mod: 26

## 2025-06-15 PROCEDURE — 70486 CT MAXILLOFACIAL W/O DYE: CPT | Mod: 26

## 2025-06-15 PROCEDURE — 74174 CTA ABD&PLVS W/CONTRAST: CPT | Mod: 26

## 2025-06-15 RX ORDER — AMLODIPINE BESYLATE 10 MG/1
2.5 TABLET ORAL DAILY
Refills: 0 | Status: DISCONTINUED | OUTPATIENT
Start: 2025-06-15 | End: 2025-06-16

## 2025-06-15 RX ORDER — ROSUVASTATIN CALCIUM 20 MG/1
5 TABLET, FILM COATED ORAL AT BEDTIME
Refills: 0 | Status: DISCONTINUED | OUTPATIENT
Start: 2025-06-15 | End: 2025-06-16

## 2025-06-15 RX ORDER — ASPIRIN 325 MG
81 TABLET ORAL DAILY
Refills: 0 | Status: DISCONTINUED | OUTPATIENT
Start: 2025-06-15 | End: 2025-06-16

## 2025-06-15 RX ORDER — AMLODIPINE BESYLATE 10 MG/1
1 TABLET ORAL
Refills: 0 | DISCHARGE

## 2025-06-15 RX ADMIN — ROSUVASTATIN CALCIUM 5 MILLIGRAM(S): 20 TABLET, FILM COATED ORAL at 21:11

## 2025-06-15 RX ADMIN — AMLODIPINE BESYLATE 2.5 MILLIGRAM(S): 10 TABLET ORAL at 09:51

## 2025-06-15 RX ADMIN — Medication 81 MILLIGRAM(S): at 09:50

## 2025-06-15 RX ADMIN — Medication 40 MILLIGRAM(S): at 09:51
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
## 2025-06-15 NOTE — H&P ADULT - NSHPLABSRESULTS_GEN_ALL_CORE
LABS:  cret                        15.4   7.42  )-----------( 288      ( 15 Salazar 2025 00:20 )             47.1     06-15    141  |  109[H]  |  19  ----------------------------<  106[H]  4.0   |  31  |  1.01    Ca    9.5      15 Salazar 2025 00:20    TPro  7.4  /  Alb  3.9  /  TBili  0.3  /  DBili  x   /  AST  27  /  ALT  33  /  AlkPhos  109  06-15    PT/INR - ( 15 Salazar 2025 00:20 )   PT: 11.0 sec;   INR: 0.93 ratio         PTT - ( 15 Salazar 2025 00:20 )  PTT:33.8 sec    < from: CT Head No Cont (06.15.25 @ 01:42) >      IMPRESSION:    CT HEAD:  No acute intracranial hemorrhage, mass effect, or midline shift.    CT MAXILLOFACIAL:  No acute fracture.    CT CERVICAL SPINE:  No acute fracture or traumatic subluxation.    Multi-level degenerative changes.        --- End of Report ---    < end of copied text >    < from: CT Angio Chest Aorta w/wo IV Cont (06.15.25 @ 01:47) >      IMPRESSION:  No acute findings. Other findings, as above.    --- End of Report ---    < end of copied text >    < from: CT Angio Abdomen and Pelvis w/ IV Cont (06.15.25 @ 01:47) >    IMPRESSION:  No acute findings. Other findings, as above.    --- End of Report ---    < end of copied text >

## 2025-06-15 NOTE — CONSULT NOTE ADULT - CARDIOVASCULAR
performed by Bryson Bryant DO at [address] [facility] Left 11/14/2023    PROXIMAL TIBIA IRRIGATION AND DEBRIDEMENT, HARDWARE REMOVAL  , CULTURES SENT C-ARM performed by Bryson Bryant DO at [address] [facility] Left 11/11/2022    CLAVICLE OPEN REDUCTION INTERNAL FIXATION performed by Bryson Bryant DO at [address] [facility] Right 11/11/2022    RIGHT DISTAL RADIUS OPEN REDUCTION INTERNAL FIXATION, performed by Bryson Bryant DO at [address] [facility] Left 11/14/2023    PROXIMAL TIBIA IRRIGATION AND DEBRIDEMENT, HARDWARE REMOVAL    LEG SURGERY Left 11/11/2022    IRRIGATION AND DEBRIDEMENT 20X 8  AND 11X5 CM LEFT LOWER EXTEMITY , WOUND SKIN - BONE , OPEN REDUCTION INTERNAL FIXATION  LEFT FIBULA , APPLICATION OF TISSUE . EXPANDER LEFT LEG , COMPLEX WOUND CLOSURE 11 X 15 CM , APPLICATION OF WOUND VAC LEFT LEG. REPAIR OF LEFT ACHILLES .  CLOSED TREATMENT OF LEFT RADIAL HEAD performed by Bryson Bryant DO at [address] [facility] Left 11/15/2022    TIBIAL PLATEAU OPEN REDUCTION INTERNAL FIXATION, LEFT FIBULA ELASTIC NAIL EXCHANGE, WOUND VAC EXCHANGE performed by Bryson Bryant DO at [address] Left 11/15/2022    TIBIAL PLATEAU OPEN REDUCTION INTERNAL FIXATION, POSSIBLE REPEAT INCISION AND DRAINAGE, POSSIBLE SKIN GRAFTING - Left    OTHER SURGICAL HISTORY Left 11/09/2022    Left Femur open treatment with interamedullary nail insertion/ left femur irrigation and debridement placement of wound vac left femur    SKIN GRAFT Left 12/06/2022    lower extremity    WRIST SURGERY Right     tendon repair       Family History   Problem Relation Age of Onset    Breast Cancer Mother     Other Father         \"car fell on him\"       Social History     Socioeconomic History    Marital status:      Spouse name: Not on file    Number of children: Not on file    Years of education: Not on file    Highest education level: Not on file normal/regular rate and rhythm/S1 S2 present/no gallops/no rub/no murmur

## 2025-06-15 NOTE — ED PROVIDER NOTE - PROGRESS NOTE DETAILS
Zeny RUST: Spoke with Dr. Sherman? on behalf of Dr. Miriam Corrigan. Agreeable with admission, EP consult. Spoke with pt and family, pt agreeable with admission. will request medical admission for symptomatic bradycardia. Zeny RUST: Spoke with Dr. Liz Skelton, hospitalist admission of patient appreciated. obs, ms c tele, rtm.

## 2025-06-15 NOTE — ED PROVIDER NOTE - CPE EDP [facility] NORM
6 month ultrasound follow-up recommended for hypoechoic lesion in the right  breast at 10:00 8 cm from the nipple. Need to get scheduled. normal...
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
## 2025-06-15 NOTE — H&P ADULT - NSHPPHYSICALEXAM_GEN_ALL_CORE
T(C): 36.7 (06-15-25 @ 06:25), Max: 36.8 (06-14-25 @ 23:39)  HR: 52 (06-15-25 @ 06:25) (48 - 63)  BP: 133/84 (06-15-25 @ 06:25) (126/76 - 145/88)  RR: 18 (06-15-25 @ 06:25) (16 - 18)  SpO2: 96% (06-15-25 @ 06:25) (94% - 97%)    General: non-toxic  HEENT: non-traumatic, perrla, eomi  Cardio: s1s2 regular rate and rhythm  Lungs: comfortable breathing, clear to auscultation  Abdomen: Soft, non-tender, non-distended  Neuro: AOx4  Ext: Pulses +2

## 2025-06-15 NOTE — ED ADULT NURSE REASSESSMENT NOTE - NS ED NURSE REASSESS COMMENT FT1
Received report from NERIS Nieto. Pt sleeping in bed. Pending bed placement. VS as charted. Safety and comfort measures in place.
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
## 2025-06-15 NOTE — ED ADULT NURSE NOTE - NSFALLHARMRISKINTERV_ED_ALL_ED

## 2025-06-15 NOTE — PATIENT PROFILE ADULT - NSPROPOAURINARYCATHETER_GEN_A_NUR
My Asthma Action Plan    Name: Rishabh Nolen   YOB: 1964  Date: 3/15/2022   My doctor: May Dominguez CNP   My clinic: [facility]        My Rescue Medicine:   Albuterol inhaler (Proair/Ventolin/Proventil HFA)  2-4 puffs EVERY 4 HOURS as needed. Use a spacer if recommended by your provider.   My Asthma Severity:   Intermittent / Exercise Induced  Know your asthma triggers: exercise or sports             GREEN ZONE   Good Control    I feel good    No cough or wheeze    Can work, sleep and play without asthma symptoms       Take your asthma control medicine every day.     1. If exercise triggers your asthma, take your rescue medication    15 minutes before exercise or sports, and    During exercise if you have asthma symptoms  2. Spacer to use with inhaler: If you have a spacer, make sure to use it with your inhaler             YELLOW ZONE Getting Worse  I have ANY of these:    I do not feel good    Cough or wheeze    Chest feels tight    Wake up at night   1. Keep taking your Green Zone medications  2. Start taking your rescue medicine:    every 20 minutes for up to 1 hour. Then every 4 hours for 24-48 hours.  3. If you stay in the Yellow Zone for more than 12-24 hours, contact your doctor.  4. If you do not return to the Green Zone in 12-24 hours or you get worse, start taking your oral steroid medicine if prescribed by your provider.           RED ZONE Medical Alert - Get Help  I have ANY of these:    I feel awful    Medicine is not helping    Breathing getting harder    Trouble walking or talking    Nose opens wide to breathe       1. Take your rescue medicine NOW  2. If your provider has prescribed an oral steroid medicine, start taking it NOW  3. Call your doctor NOW  4. If you are still in the Red Zone after 20 minutes and you have not reached your doctor:    Take your rescue medicine again and    Call 911 or go to the emergency room right away    See your regular  doctor within 2 weeks of an Emergency Room or Urgent Care visit for follow-up treatment.          Annual Reminders:  Meet with Asthma Educator,  Flu Shot in the Fall, consider Pneumonia Vaccination for patients with asthma (aged 19 and older).    Pharmacy: [id number] [address] [facility] [address]    Electronically signed by May Dominguez CNP   Date: 03/15/22                    Asthma Triggers  How To Control Things That Make Your Asthma Worse    Triggers are things that make your asthma worse.  Look at the list below to help you find your triggers and   what you can do about them. You can help prevent asthma flare-ups by staying away from your triggers.      Trigger                                                          What you can do   Cigarette Smoke  Tobacco smoke can make asthma worse. Do not allow smoking in your home, car or around you.  Be sure no one smokes at a child s day care or school.  If you smoke, ask your health care provider for ways to help you quit.  Ask family members to quit too.  Ask your health care provider for a referral to Quit Plan to help you quit smoking, or call 0-533-658-PLAN.     Colds, Flu, Bronchitis  These are common triggers of asthma. Wash your hands often.  Don t touch your eyes, nose or mouth.  Get a flu shot every year.     Dust Mites  These are tiny bugs that live in cloth or carpet. They are too small to see. Wash sheets and blankets in hot water every week.   Encase pillows and mattress in dust mite proof covers.  Avoid having carpet if you can. If you have carpet, vacuum weekly.   Use a dust mask and HEPA vacuum.   Pollen and Outdoor Mold  Some people are allergic to trees, grass, or weed pollen, or molds. Try to keep your windows closed.  Limit time out doors when pollen count is high.   Ask you health care provider about taking medicine during allergy season.     Animal Dander  Some people are allergic to skin  flakes, urine or saliva from pets with fur or feathers. Keep pets with fur or feathers out of your home.    If you can t keep the pet outdoors, then keep the pet out of your bedroom.  Keep the bedroom door closed.  Keep pets off cloth furniture and away from stuffed toys.     Mice, Rats, and Cockroaches  Some people are allergic to the waste from these pests.   Cover food and garbage.  Clean up spills and food crumbs.  Store grease in the refrigerator.   Keep food out of the bedroom.   Indoor Mold  This can be a trigger if your home has high moisture. Fix leaking faucets, pipes, or other sources of water.   Clean moldy surfaces.  Dehumidify basement if it is damp and smelly.   Smoke, Strong Odors, and Sprays  These can reduce air quality. Stay away from strong odors and sprays, such as perfume, powder, hair spray, paints, smoke incense, paint, cleaning products, candles and new carpet.   Exercise or Sports  Some people with asthma have this trigger. Be active!  Ask your doctor about taking medicine before sports or exercise to prevent symptoms.    Warm up for 5-10 minutes before and after sports or exercise.     Other Triggers of Asthma  Cold air:  Cover your nose and mouth with a scarf.  Sometimes laughing or crying can be a trigger.  Some medicines and food can trigger asthma.        no

## 2025-06-16 ENCOUNTER — TRANSCRIPTION ENCOUNTER (OUTPATIENT)
Age: 70
End: 2025-06-16

## 2025-06-16 ENCOUNTER — RX RENEWAL (OUTPATIENT)
Age: 70
End: 2025-06-16

## 2025-06-16 ENCOUNTER — RESULT REVIEW (OUTPATIENT)
Age: 70
End: 2025-06-16

## 2025-06-16 VITALS
HEART RATE: 84 BPM | DIASTOLIC BLOOD PRESSURE: 91 MMHG | OXYGEN SATURATION: 96 % | RESPIRATION RATE: 18 BRPM | TEMPERATURE: 98 F | SYSTOLIC BLOOD PRESSURE: 131 MMHG

## 2025-06-16 LAB
ADD ON TEST-SPECIMEN IN LAB: SIGNIFICANT CHANGE UP
TROPONIN I, HIGH SENSITIVITY RESULT: 4.89 NG/L — SIGNIFICANT CHANGE UP

## 2025-06-16 PROCEDURE — 93018 CV STRESS TEST I&R ONLY: CPT

## 2025-06-16 PROCEDURE — 99239 HOSP IP/OBS DSCHRG MGMT >30: CPT

## 2025-06-16 PROCEDURE — 99223 1ST HOSP IP/OBS HIGH 75: CPT

## 2025-06-16 PROCEDURE — 93016 CV STRESS TEST SUPVJ ONLY: CPT

## 2025-06-16 PROCEDURE — 78452 HT MUSCLE IMAGE SPECT MULT: CPT | Mod: 26

## 2025-06-16 RX ADMIN — Medication 81 MILLIGRAM(S): at 10:17

## 2025-06-16 RX ADMIN — AMLODIPINE BESYLATE 2.5 MILLIGRAM(S): 10 TABLET ORAL at 10:17

## 2025-06-16 RX ADMIN — Medication 40 MILLIGRAM(S): at 10:18
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
## 2025-06-16 NOTE — DISCHARGE NOTE PROVIDER - CARE PROVIDERS DIRECT ADDRESSES
,[email or web address]
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
## 2025-06-16 NOTE — DISCHARGE NOTE NURSING/CASE MANAGEMENT/SOCIAL WORK - FINANCIAL ASSISTANCE
[facility] provides services at a reduced cost to those who are determined to be eligible through [facility]’s financial assistance program. Information regarding [facility]’s financial assistance program can be found by going to [email or web address] or by calling [phone].

## 2025-06-16 NOTE — DISCHARGE NOTE PROVIDER - CARE PROVIDER_API CALL
Gaye Corrigan  Cardiovascular Disease  [address] [place] [address]  Phone: [phone]  Fax: [phone]  Scheduled Appointment: 06/18/2025 12:15 PM    Mian Najera  Clinical Cardiac Electrophysiology  [address] [place] [address]  Phone: [phone]  Fax: [phone]  Follow Up Time: 4-6 Days

## 2025-06-16 NOTE — DISCHARGE NOTE NURSING/CASE MANAGEMENT/SOCIAL WORK - PATIENT PORTAL LINK FT
You can access the FollowMyHealth Patient Portal offered by [facility] by registering at the following website: [email or web address]. By joining [facility]’s FollowMyHealth portal, you will also be able to view your health information using other applications (apps) compatible with our system.
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
## 2025-06-16 NOTE — DISCHARGE NOTE PROVIDER - PROVIDER TOKENS
PROVIDER:[TOKEN:[[id number]],SCHEDULEDAPPT:[06/18/2025],SCHEDULEDAPPTTIME:[12:15 PM]],PROVIDER:[TOKEN:[[id number]],FOLLOWUP:[4-6 Days]]

## 2025-06-16 NOTE — DISCHARGE NOTE PROVIDER - NSDCMRMEDTOKEN_GEN_ALL_CORE_FT
amLODIPine 2.5 mg oral tablet: 1 tab(s) orally once a day  aspirin 81 mg oral tablet, chewable: 1 tab(s) orally once a day  doxazosin 1 mg oral tablet: 1 tab(s) orally once a day (in the morning)  doxazosin 2 mg oral tablet: 1 tab(s) orally once a day (in the evening)  rosuvastatin 5 mg oral tablet: 1 tab(s) orally once a day (at bedtime)  valsartan 40 mg oral tablet: 1 tab(s) orally once a day

## 2025-06-16 NOTE — DISCHARGE NOTE PROVIDER - NSDCFUSCHEDAPPT_GEN_ALL_CORE_FT
Addi Selby  [facility] Physician [facility]  UROLOGY [address]  Scheduled Appointment: 08/08/2025
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
## 2025-06-19 ENCOUNTER — RX RENEWAL (OUTPATIENT)
Age: 70
End: 2025-06-19

## 2025-06-20 ENCOUNTER — APPOINTMENT (OUTPATIENT)
Dept: FAMILY MEDICINE | Facility: CLINIC | Age: 70
End: 2025-06-20
Payer: MEDICARE

## 2025-06-20 VITALS
TEMPERATURE: 97.7 F | BODY MASS INDEX: 27.42 KG/M2 | HEART RATE: 77 BPM | HEIGHT: 68.5 IN | WEIGHT: 183 LBS | DIASTOLIC BLOOD PRESSURE: 86 MMHG | SYSTOLIC BLOOD PRESSURE: 126 MMHG | OXYGEN SATURATION: 96 %

## 2025-06-20 PROBLEM — N50.89 TESTICULAR MASS: Status: ACTIVE | Noted: 2025-06-20

## 2025-06-20 PROCEDURE — 99213 OFFICE O/P EST LOW 20 MIN: CPT

## 2025-06-20 PROCEDURE — G2211 COMPLEX E/M VISIT ADD ON: CPT

## 2025-06-26 NOTE — DISCHARGE NOTE NURSING/CASE MANAGEMENT/SOCIAL WORK - NSDPDISTO_GEN_ALL_CORE
Medication passed protocol.     Medication: sertraline  Last office visit date: 3/10/25  Next appointment scheduled?: Yes 7/24/25  
Home

## 2025-07-15 ENCOUNTER — APPOINTMENT (OUTPATIENT)
Dept: ULTRASOUND IMAGING | Facility: CLINIC | Age: 70
End: 2025-07-15
Payer: MEDICARE

## 2025-07-15 ENCOUNTER — RESULT REVIEW (OUTPATIENT)
Age: 70
End: 2025-07-15

## 2025-07-15 ENCOUNTER — OUTPATIENT (OUTPATIENT)
Dept: OUTPATIENT SERVICES | Facility: HOSPITAL | Age: 70
LOS: 1 days | End: 2025-07-15
Payer: MEDICARE

## 2025-07-15 DIAGNOSIS — Z98.890 OTHER SPECIFIED POSTPROCEDURAL STATES: Chronic | ICD-10-CM

## 2025-07-15 DIAGNOSIS — Z90.89 ACQUIRED ABSENCE OF OTHER ORGANS: Chronic | ICD-10-CM

## 2025-07-15 DIAGNOSIS — N50.89 OTHER SPECIFIED DISORDERS OF THE MALE GENITAL ORGANS: ICD-10-CM

## 2025-07-15 DIAGNOSIS — K56.69 OTHER INTESTINAL OBSTRUCTION: Chronic | ICD-10-CM

## 2025-07-15 DIAGNOSIS — K35.2 ACUTE APPENDICITIS WITH GENERALIZED PERITONITIS: Chronic | ICD-10-CM

## 2025-07-15 DIAGNOSIS — Z90.49 ACQUIRED ABSENCE OF OTHER SPECIFIED PARTS OF DIGESTIVE TRACT: Chronic | ICD-10-CM

## 2025-07-15 DIAGNOSIS — K66.0 PERITONEAL ADHESIONS (POSTPROCEDURAL) (POSTINFECTION): Chronic | ICD-10-CM

## 2025-07-15 PROCEDURE — 93975 VASCULAR STUDY: CPT

## 2025-07-15 PROCEDURE — 76870 US EXAM SCROTUM: CPT

## 2025-07-15 PROCEDURE — 93975 VASCULAR STUDY: CPT | Mod: 26

## 2025-07-15 PROCEDURE — 76870 US EXAM SCROTUM: CPT | Mod: 26

## 2025-07-16 ENCOUNTER — TRANSCRIPTION ENCOUNTER (OUTPATIENT)
Age: 70
End: 2025-07-16

## 2025-08-06 ENCOUNTER — TRANSCRIPTION ENCOUNTER (OUTPATIENT)
Age: 70
End: 2025-08-06

## 2025-08-07 ENCOUNTER — NON-APPOINTMENT (OUTPATIENT)
Age: 70
End: 2025-08-07

## 2025-08-08 ENCOUNTER — APPOINTMENT (OUTPATIENT)
Dept: UROLOGY | Facility: CLINIC | Age: 70
End: 2025-08-08

## 2025-08-08 VITALS
OXYGEN SATURATION: 97 % | HEIGHT: 69 IN | SYSTOLIC BLOOD PRESSURE: 130 MMHG | WEIGHT: 180 LBS | RESPIRATION RATE: 16 BRPM | DIASTOLIC BLOOD PRESSURE: 80 MMHG | HEART RATE: 54 BPM | BODY MASS INDEX: 26.66 KG/M2

## 2025-08-08 DIAGNOSIS — R39.15 URGENCY OF URINATION: ICD-10-CM

## 2025-08-08 DIAGNOSIS — Z85.46 PERSONAL HISTORY OF MALIGNANT NEOPLASM OF PROSTATE: ICD-10-CM

## 2025-08-08 PROCEDURE — 99214 OFFICE O/P EST MOD 30 MIN: CPT

## 2025-08-08 RX ORDER — MIRABEGRON 25 MG/1
25 TABLET, EXTENDED RELEASE ORAL
Qty: 90 | Refills: 1 | Status: ACTIVE | COMMUNITY
Start: 2025-08-08 | End: 1900-01-01

## 2025-08-09 ENCOUNTER — TRANSCRIPTION ENCOUNTER (OUTPATIENT)
Age: 70
End: 2025-08-09

## 2025-08-09 LAB — PSA SERPL-MCNC: <0.01 NG/ML

## 2025-08-21 PROBLEM — N50.3 EPIDIDYMAL CYST: Status: ACTIVE | Noted: 2025-08-21
